# Patient Record
Sex: FEMALE | Race: WHITE | NOT HISPANIC OR LATINO | Employment: FULL TIME | URBAN - METROPOLITAN AREA
[De-identification: names, ages, dates, MRNs, and addresses within clinical notes are randomized per-mention and may not be internally consistent; named-entity substitution may affect disease eponyms.]

---

## 2017-12-07 ENCOUNTER — LAB (OUTPATIENT)
Dept: LAB | Facility: HOSPITAL | Age: 32
End: 2017-12-07

## 2017-12-07 ENCOUNTER — TRANSCRIBE ORDERS (OUTPATIENT)
Dept: LAB | Facility: HOSPITAL | Age: 32
End: 2017-12-07

## 2017-12-07 DIAGNOSIS — Z34.81 PRENATAL CARE, SUBSEQUENT PREGNANCY, FIRST TRIMESTER: ICD-10-CM

## 2017-12-07 DIAGNOSIS — Z3A.08 8 WEEKS GESTATION OF PREGNANCY: ICD-10-CM

## 2017-12-07 DIAGNOSIS — Z3A.08 8 WEEKS GESTATION OF PREGNANCY: Primary | ICD-10-CM

## 2017-12-07 LAB
ABO GROUP BLD: NORMAL
AMPHET+METHAMPHET UR QL: NEGATIVE
AMPHETAMINES UR QL: NEGATIVE
BACTERIA UR QL AUTO: ABNORMAL /HPF
BARBITURATES UR QL SCN: NEGATIVE
BASOPHILS # BLD AUTO: 0.02 10*3/MM3 (ref 0–0.2)
BASOPHILS NFR BLD AUTO: 0.2 % (ref 0–1)
BENZODIAZ UR QL SCN: NEGATIVE
BILIRUB UR QL STRIP: NEGATIVE
BLD GP AB SCN SERPL QL: NEGATIVE
BUPRENORPHINE SERPL-MCNC: NEGATIVE NG/ML
CANNABINOIDS SERPL QL: NEGATIVE
CLARITY UR: ABNORMAL
COCAINE UR QL: NEGATIVE
COLOR UR: YELLOW
DEPRECATED RDW RBC AUTO: 43.5 FL (ref 37–54)
EOSINOPHIL # BLD AUTO: 0.08 10*3/MM3 (ref 0–0.3)
EOSINOPHIL NFR BLD AUTO: 0.7 % (ref 0–3)
ERYTHROCYTE [DISTWIDTH] IN BLOOD BY AUTOMATED COUNT: 13.2 % (ref 11.3–14.5)
GLUCOSE BLD-MCNC: 75 MG/DL (ref 70–100)
GLUCOSE UR STRIP-MCNC: NEGATIVE MG/DL
HBV SURFACE AG SERPL QL IA: NORMAL
HCT VFR BLD AUTO: 39.4 % (ref 34.5–44)
HCV AB SER DONR QL: NORMAL
HGB BLD-MCNC: 13.2 G/DL (ref 11.5–15.5)
HGB UR QL STRIP.AUTO: NEGATIVE
HIV1+2 AB SER QL: NORMAL
HYALINE CASTS UR QL AUTO: ABNORMAL /LPF
IMM GRANULOCYTES # BLD: 0.02 10*3/MM3 (ref 0–0.03)
IMM GRANULOCYTES NFR BLD: 0.2 % (ref 0–0.6)
KETONES UR QL STRIP: NEGATIVE
LEUKOCYTE ESTERASE UR QL STRIP.AUTO: NEGATIVE
LYMPHOCYTES # BLD AUTO: 1.84 10*3/MM3 (ref 0.6–4.8)
LYMPHOCYTES NFR BLD AUTO: 16.2 % (ref 24–44)
MCH RBC QN AUTO: 30.3 PG (ref 27–31)
MCHC RBC AUTO-ENTMCNC: 33.5 G/DL (ref 32–36)
MCV RBC AUTO: 90.4 FL (ref 80–99)
METHADONE UR QL SCN: NEGATIVE
MONOCYTES # BLD AUTO: 0.89 10*3/MM3 (ref 0–1)
MONOCYTES NFR BLD AUTO: 7.8 % (ref 0–12)
NEUTROPHILS # BLD AUTO: 8.51 10*3/MM3 (ref 1.5–8.3)
NEUTROPHILS NFR BLD AUTO: 74.9 % (ref 41–71)
NITRITE UR QL STRIP: NEGATIVE
OPIATES UR QL: NEGATIVE
OXYCODONE UR QL SCN: NEGATIVE
PCP UR QL SCN: NEGATIVE
PH UR STRIP.AUTO: 6 [PH] (ref 5–8)
PLATELET # BLD AUTO: 262 10*3/MM3 (ref 150–450)
PMV BLD AUTO: 10.5 FL (ref 6–12)
PROPOXYPH UR QL: NEGATIVE
PROT UR QL STRIP: NEGATIVE
RBC # BLD AUTO: 4.36 10*6/MM3 (ref 3.89–5.14)
RBC # UR: ABNORMAL /HPF
REF LAB TEST METHOD: ABNORMAL
RH BLD: NEGATIVE
RUBV IGG SER QL: NORMAL
RUBV IGG SER-ACNC: >175 IU/ML
SP GR UR STRIP: 1.03 (ref 1–1.03)
SQUAMOUS #/AREA URNS HPF: ABNORMAL /HPF
TRICYCLICS UR QL SCN: NEGATIVE
UROBILINOGEN UR QL STRIP: ABNORMAL
WBC NRBC COR # BLD: 11.36 10*3/MM3 (ref 3.5–10.8)
WBC UR QL AUTO: ABNORMAL /HPF

## 2017-12-07 PROCEDURE — 36415 COLL VENOUS BLD VENIPUNCTURE: CPT

## 2017-12-07 PROCEDURE — 87086 URINE CULTURE/COLONY COUNT: CPT

## 2017-12-07 PROCEDURE — 80306 DRUG TEST PRSMV INSTRMNT: CPT

## 2017-12-07 PROCEDURE — 86803 HEPATITIS C AB TEST: CPT

## 2017-12-07 PROCEDURE — 81001 URINALYSIS AUTO W/SCOPE: CPT

## 2017-12-07 PROCEDURE — 82947 ASSAY GLUCOSE BLOOD QUANT: CPT

## 2017-12-07 PROCEDURE — 80081 OBSTETRIC PANEL INC HIV TSTG: CPT

## 2017-12-08 LAB — RPR SER QL: NORMAL

## 2017-12-09 LAB
BACTERIA SPEC AEROBE CULT: NORMAL
BACTERIA SPEC AEROBE CULT: NORMAL

## 2018-04-27 ENCOUNTER — APPOINTMENT (OUTPATIENT)
Dept: LAB | Facility: HOSPITAL | Age: 33
End: 2018-04-27

## 2018-04-27 ENCOUNTER — TRANSCRIBE ORDERS (OUTPATIENT)
Dept: LAB | Facility: HOSPITAL | Age: 33
End: 2018-04-27

## 2018-04-27 DIAGNOSIS — Z3A.28 28 WEEKS GESTATION OF PREGNANCY: Primary | ICD-10-CM

## 2018-04-27 DIAGNOSIS — Z34.83 PRENATAL CARE, SUBSEQUENT PREGNANCY, THIRD TRIMESTER: ICD-10-CM

## 2018-04-27 LAB
BLD GP AB SCN SERPL QL: NEGATIVE
DEPRECATED RDW RBC AUTO: 46.9 FL (ref 37–54)
ERYTHROCYTE [DISTWIDTH] IN BLOOD BY AUTOMATED COUNT: 13.6 % (ref 11.3–14.5)
GLUCOSE 1H P 100 G GLC PO SERPL-MCNC: 155 MG/DL (ref 65–140)
HCT VFR BLD AUTO: 34.3 % (ref 34.5–44)
HGB BLD-MCNC: 11.2 G/DL (ref 11.5–15.5)
MCH RBC QN AUTO: 30.6 PG (ref 27–31)
MCHC RBC AUTO-ENTMCNC: 32.7 G/DL (ref 32–36)
MCV RBC AUTO: 93.7 FL (ref 80–99)
PLATELET # BLD AUTO: 228 10*3/MM3 (ref 150–450)
PMV BLD AUTO: 11.4 FL (ref 6–12)
RBC # BLD AUTO: 3.66 10*6/MM3 (ref 3.89–5.14)
WBC NRBC COR # BLD: 11.6 10*3/MM3 (ref 3.5–10.8)

## 2018-04-27 PROCEDURE — 85027 COMPLETE CBC AUTOMATED: CPT | Performed by: OBSTETRICS & GYNECOLOGY

## 2018-04-27 PROCEDURE — 86850 RBC ANTIBODY SCREEN: CPT | Performed by: OBSTETRICS & GYNECOLOGY

## 2018-04-27 PROCEDURE — 82950 GLUCOSE TEST: CPT | Performed by: OBSTETRICS & GYNECOLOGY

## 2018-04-27 PROCEDURE — 36415 COLL VENOUS BLD VENIPUNCTURE: CPT | Performed by: OBSTETRICS & GYNECOLOGY

## 2018-04-27 PROCEDURE — 82962 GLUCOSE BLOOD TEST: CPT | Performed by: OBSTETRICS & GYNECOLOGY

## 2018-05-02 ENCOUNTER — LAB (OUTPATIENT)
Dept: LAB | Facility: HOSPITAL | Age: 33
End: 2018-05-02

## 2018-05-02 ENCOUNTER — TRANSCRIBE ORDERS (OUTPATIENT)
Dept: LAB | Facility: HOSPITAL | Age: 33
End: 2018-05-02

## 2018-05-02 DIAGNOSIS — O99.810 ABNORMAL MATERNAL GLUCOSE TOLERANCE, ANTEPARTUM: ICD-10-CM

## 2018-05-02 DIAGNOSIS — Z3A.28 28 WEEKS GESTATION OF PREGNANCY: ICD-10-CM

## 2018-05-02 DIAGNOSIS — Z3A.28 28 WEEKS GESTATION OF PREGNANCY: Primary | ICD-10-CM

## 2018-05-02 DIAGNOSIS — Z34.83 PRENATAL CARE, SUBSEQUENT PREGNANCY, THIRD TRIMESTER: ICD-10-CM

## 2018-05-02 LAB
GLUCOSE 1H P 100 G GLC PO SERPL-MCNC: 159 MG/DL (ref 65–199)
GLUCOSE 2H P 100 G GLC PO SERPL-MCNC: 96 MG/DL (ref 65–139)
GLUCOSE 3H P 100 G GLC PO SERPL-MCNC: 56 MG/DL (ref 65–109)
GLUCOSE P FAST SERPL-MCNC: 81 MG/DL (ref 65–99)

## 2018-05-02 PROCEDURE — 36415 COLL VENOUS BLD VENIPUNCTURE: CPT

## 2018-05-02 PROCEDURE — 82962 GLUCOSE BLOOD TEST: CPT | Performed by: OBSTETRICS & GYNECOLOGY

## 2018-05-02 PROCEDURE — 82952 GTT-ADDED SAMPLES: CPT

## 2018-05-02 PROCEDURE — 82951 GLUCOSE TOLERANCE TEST (GTT): CPT

## 2018-07-19 ENCOUNTER — PREP FOR SURGERY (OUTPATIENT)
Dept: OTHER | Facility: HOSPITAL | Age: 33
End: 2018-07-19

## 2018-07-19 DIAGNOSIS — Z3A.39 39 WEEKS GESTATION OF PREGNANCY: Primary | ICD-10-CM

## 2018-07-19 RX ORDER — ONDANSETRON 2 MG/ML
4 INJECTION INTRAMUSCULAR; INTRAVENOUS EVERY 6 HOURS PRN
Status: CANCELLED | OUTPATIENT
Start: 2018-07-19 | End: 2019-07-19

## 2018-07-19 RX ORDER — ONDANSETRON 4 MG/1
4 TABLET, FILM COATED ORAL EVERY 6 HOURS PRN
Status: CANCELLED | OUTPATIENT
Start: 2018-07-19 | End: 2019-07-19

## 2018-07-19 RX ORDER — ACETAMINOPHEN 325 MG/1
650 TABLET ORAL EVERY 4 HOURS PRN
Status: CANCELLED | OUTPATIENT
Start: 2018-07-19 | End: 2019-07-19

## 2018-07-19 RX ORDER — ZOLPIDEM TARTRATE 5 MG/1
5 TABLET ORAL NIGHTLY PRN
Status: CANCELLED | OUTPATIENT
Start: 2018-07-19 | End: 2018-07-29

## 2018-07-19 RX ORDER — OXYTOCIN-SODIUM CHLORIDE 0.9% IV SOLN 30 UNIT/500ML 30-0.9/5 UT/ML-%
85 SOLUTION INTRAVENOUS ONCE
Status: CANCELLED | OUTPATIENT
Start: 2018-07-19 | End: 2018-07-19

## 2018-07-19 RX ORDER — SODIUM CHLORIDE 0.9 % (FLUSH) 0.9 %
1-10 SYRINGE (ML) INJECTION AS NEEDED
Status: CANCELLED | OUTPATIENT
Start: 2018-07-19 | End: 2019-07-19

## 2018-07-19 RX ORDER — MAGNESIUM CARB/ALUMINUM HYDROX 105-160MG
30 TABLET,CHEWABLE ORAL ONCE
Status: CANCELLED | OUTPATIENT
Start: 2018-07-19 | End: 2018-07-19

## 2018-07-19 RX ORDER — METHYLERGONOVINE MALEATE 0.2 MG/ML
200 INJECTION INTRAVENOUS ONCE AS NEEDED
Status: CANCELLED | OUTPATIENT
Start: 2018-07-19 | End: 2018-07-26

## 2018-07-19 RX ORDER — HYDROCODONE BITARTRATE AND ACETAMINOPHEN 5; 325 MG/1; MG/1
2 TABLET ORAL EVERY 4 HOURS PRN
Status: CANCELLED | OUTPATIENT
Start: 2018-07-19 | End: 2018-07-29

## 2018-07-19 RX ORDER — OXYTOCIN-SODIUM CHLORIDE 0.9% IV SOLN 30 UNIT/500ML 30-0.9/5 UT/ML-%
650 SOLUTION INTRAVENOUS ONCE
Status: CANCELLED | OUTPATIENT
Start: 2018-07-19 | End: 2018-07-19

## 2018-07-19 RX ORDER — BUTORPHANOL TARTRATE 1 MG/ML
1 INJECTION, SOLUTION INTRAMUSCULAR; INTRAVENOUS
Status: CANCELLED | OUTPATIENT
Start: 2018-07-19 | End: 2019-07-19

## 2018-07-19 RX ORDER — MISOPROSTOL 200 UG/1
800 TABLET ORAL AS NEEDED
Status: CANCELLED | OUTPATIENT
Start: 2018-07-19 | End: 2019-07-19

## 2018-07-19 RX ORDER — SODIUM CHLORIDE, SODIUM LACTATE, POTASSIUM CHLORIDE, CALCIUM CHLORIDE 600; 310; 30; 20 MG/100ML; MG/100ML; MG/100ML; MG/100ML
125 INJECTION, SOLUTION INTRAVENOUS CONTINUOUS
Status: CANCELLED | OUTPATIENT
Start: 2018-07-19 | End: 2019-07-19

## 2018-07-19 RX ORDER — LIDOCAINE HYDROCHLORIDE 10 MG/ML
5 INJECTION, SOLUTION EPIDURAL; INFILTRATION; INTRACAUDAL; PERINEURAL AS NEEDED
Status: CANCELLED | OUTPATIENT
Start: 2018-07-19 | End: 2019-07-19

## 2018-07-19 RX ORDER — HYDROCODONE BITARTRATE AND ACETAMINOPHEN 5; 325 MG/1; MG/1
1 TABLET ORAL EVERY 4 HOURS PRN
Status: CANCELLED | OUTPATIENT
Start: 2018-07-19 | End: 2018-07-29

## 2018-07-19 RX ORDER — IBUPROFEN 600 MG/1
600 TABLET ORAL EVERY 6 HOURS PRN
Status: CANCELLED | OUTPATIENT
Start: 2018-07-19 | End: 2019-07-19

## 2018-07-23 ENCOUNTER — HOSPITAL ENCOUNTER (INPATIENT)
Dept: LABOR AND DELIVERY | Facility: HOSPITAL | Age: 33
LOS: 2 days | Discharge: HOME | End: 2018-07-25
Attending: OBSTETRICS & GYNECOLOGY | Admitting: OBSTETRICS & GYNECOLOGY

## 2018-07-23 DIAGNOSIS — Z3A.39 39 WEEKS GESTATION OF PREGNANCY: ICD-10-CM

## 2018-07-23 LAB
ABO GROUP BLD: NORMAL
BLD GP AB SCN SERPL QL: NEGATIVE
DEPRECATED RDW RBC AUTO: 45.9 FL (ref 37–54)
ERYTHROCYTE [DISTWIDTH] IN BLOOD BY AUTOMATED COUNT: 13.8 % (ref 11.3–14.5)
HCT VFR BLD AUTO: 33.7 % (ref 34.5–44)
HGB BLD-MCNC: 11.3 G/DL (ref 11.5–15.5)
MCH RBC QN AUTO: 30.5 PG (ref 27–31)
MCHC RBC AUTO-ENTMCNC: 33.5 G/DL (ref 32–36)
MCV RBC AUTO: 91.1 FL (ref 80–99)
PLATELET # BLD AUTO: 240 10*3/MM3 (ref 150–450)
PMV BLD AUTO: 10.6 FL (ref 6–12)
RBC # BLD AUTO: 3.7 10*6/MM3 (ref 3.89–5.14)
RH BLD: NEGATIVE
T&S EXPIRATION DATE: NORMAL
WBC NRBC COR # BLD: 13.06 10*3/MM3 (ref 3.5–10.8)

## 2018-07-23 PROCEDURE — 86850 RBC ANTIBODY SCREEN: CPT | Performed by: OBSTETRICS & GYNECOLOGY

## 2018-07-23 PROCEDURE — 86900 BLOOD TYPING SEROLOGIC ABO: CPT | Performed by: OBSTETRICS & GYNECOLOGY

## 2018-07-23 PROCEDURE — 59200 INSERT CERVICAL DILATOR: CPT | Performed by: OBSTETRICS & GYNECOLOGY

## 2018-07-23 PROCEDURE — 86901 BLOOD TYPING SEROLOGIC RH(D): CPT | Performed by: OBSTETRICS & GYNECOLOGY

## 2018-07-23 PROCEDURE — 85027 COMPLETE CBC AUTOMATED: CPT | Performed by: OBSTETRICS & GYNECOLOGY

## 2018-07-23 RX ORDER — BUTORPHANOL TARTRATE 1 MG/ML
1 INJECTION, SOLUTION INTRAMUSCULAR; INTRAVENOUS
Status: DISCONTINUED | OUTPATIENT
Start: 2018-07-23 | End: 2018-07-24 | Stop reason: HOSPADM

## 2018-07-23 RX ORDER — ONDANSETRON 4 MG/1
4 TABLET, FILM COATED ORAL EVERY 6 HOURS PRN
Status: DISCONTINUED | OUTPATIENT
Start: 2018-07-23 | End: 2018-07-24

## 2018-07-23 RX ORDER — MAGNESIUM CARB/ALUMINUM HYDROX 105-160MG
30 TABLET,CHEWABLE ORAL ONCE
Status: DISCONTINUED | OUTPATIENT
Start: 2018-07-23 | End: 2018-07-24 | Stop reason: HOSPADM

## 2018-07-23 RX ORDER — SODIUM CHLORIDE, SODIUM LACTATE, POTASSIUM CHLORIDE, CALCIUM CHLORIDE 600; 310; 30; 20 MG/100ML; MG/100ML; MG/100ML; MG/100ML
125 INJECTION, SOLUTION INTRAVENOUS CONTINUOUS
Status: DISCONTINUED | OUTPATIENT
Start: 2018-07-23 | End: 2018-07-25 | Stop reason: HOSPADM

## 2018-07-23 RX ORDER — SODIUM CHLORIDE 0.9 % (FLUSH) 0.9 %
1-10 SYRINGE (ML) INJECTION AS NEEDED
Status: DISCONTINUED | OUTPATIENT
Start: 2018-07-23 | End: 2018-07-24 | Stop reason: HOSPADM

## 2018-07-23 RX ORDER — ZOLPIDEM TARTRATE 5 MG/1
5 TABLET ORAL NIGHTLY PRN
Status: DISCONTINUED | OUTPATIENT
Start: 2018-07-23 | End: 2018-07-24 | Stop reason: HOSPADM

## 2018-07-23 RX ORDER — ACETAMINOPHEN 325 MG/1
650 TABLET ORAL EVERY 4 HOURS PRN
Status: DISCONTINUED | OUTPATIENT
Start: 2018-07-23 | End: 2018-07-24

## 2018-07-23 RX ORDER — ONDANSETRON 2 MG/ML
4 INJECTION INTRAMUSCULAR; INTRAVENOUS EVERY 6 HOURS PRN
Status: DISCONTINUED | OUTPATIENT
Start: 2018-07-23 | End: 2018-07-24

## 2018-07-23 RX ORDER — LIDOCAINE HYDROCHLORIDE 10 MG/ML
5 INJECTION, SOLUTION EPIDURAL; INFILTRATION; INTRACAUDAL; PERINEURAL AS NEEDED
Status: DISCONTINUED | OUTPATIENT
Start: 2018-07-23 | End: 2018-07-24 | Stop reason: HOSPADM

## 2018-07-23 RX ADMIN — SODIUM CHLORIDE, POTASSIUM CHLORIDE, SODIUM LACTATE AND CALCIUM CHLORIDE 125 ML/HR: 600; 310; 30; 20 INJECTION, SOLUTION INTRAVENOUS at 19:30

## 2018-07-24 ENCOUNTER — ANESTHESIA (OUTPATIENT)
Dept: LABOR AND DELIVERY | Facility: HOSPITAL | Age: 33
End: 2018-07-24

## 2018-07-24 ENCOUNTER — ANESTHESIA EVENT (OUTPATIENT)
Dept: LABOR AND DELIVERY | Facility: HOSPITAL | Age: 33
End: 2018-07-24

## 2018-07-24 PROBLEM — Z34.90 PREGNANCY: Status: ACTIVE | Noted: 2018-07-24

## 2018-07-24 PROBLEM — Z3A.39 PREGNANCY WITH 39 COMPLETED WEEKS GESTATION: Status: ACTIVE | Noted: 2018-07-24

## 2018-07-24 PROBLEM — Z34.90 PREGNANCY: Status: RESOLVED | Noted: 2018-07-24 | Resolved: 2018-07-24

## 2018-07-24 PROCEDURE — 25010000002 ONDANSETRON PER 1 MG: Performed by: OBSTETRICS & GYNECOLOGY

## 2018-07-24 PROCEDURE — C1755 CATHETER, INTRASPINAL: HCPCS | Performed by: ANESTHESIOLOGY

## 2018-07-24 PROCEDURE — C1755 CATHETER, INTRASPINAL: HCPCS

## 2018-07-24 PROCEDURE — 25010000002 ROPIVACAINE PER 1 MG: Performed by: ANESTHESIOLOGY

## 2018-07-24 PROCEDURE — 10907ZC DRAINAGE OF AMNIOTIC FLUID, THERAPEUTIC FROM PRODUCTS OF CONCEPTION, VIA NATURAL OR ARTIFICIAL OPENING: ICD-10-PCS | Performed by: OBSTETRICS & GYNECOLOGY

## 2018-07-24 PROCEDURE — 25010000002 FENTANYL CITRATE (PF) 100 MCG/2ML SOLUTION: Performed by: ANESTHESIOLOGY

## 2018-07-24 PROCEDURE — 59025 FETAL NON-STRESS TEST: CPT

## 2018-07-24 RX ORDER — LANOLIN 100 %
OINTMENT (GRAM) TOPICAL AS NEEDED
Status: DISCONTINUED | OUTPATIENT
Start: 2018-07-24 | End: 2018-07-25 | Stop reason: HOSPADM

## 2018-07-24 RX ORDER — ACETAMINOPHEN 325 MG/1
650 TABLET ORAL EVERY 4 HOURS PRN
Status: DISCONTINUED | OUTPATIENT
Start: 2018-07-24 | End: 2018-07-24 | Stop reason: HOSPADM

## 2018-07-24 RX ORDER — OXYTOCIN-SODIUM CHLORIDE 0.9% IV SOLN 30 UNIT/500ML 30-0.9/5 UT/ML-%
650 SOLUTION INTRAVENOUS ONCE
Status: COMPLETED | OUTPATIENT
Start: 2018-07-24 | End: 2018-07-24

## 2018-07-24 RX ORDER — ZOLPIDEM TARTRATE 5 MG/1
5 TABLET ORAL NIGHTLY PRN
Status: DISCONTINUED | OUTPATIENT
Start: 2018-07-24 | End: 2018-07-25 | Stop reason: HOSPADM

## 2018-07-24 RX ORDER — OXYTOCIN-SODIUM CHLORIDE 0.9% IV SOLN 30 UNIT/500ML 30-0.9/5 UT/ML-%
2-30 SOLUTION INTRAVENOUS
Status: DISCONTINUED | OUTPATIENT
Start: 2018-07-24 | End: 2018-07-25 | Stop reason: HOSPADM

## 2018-07-24 RX ORDER — ACETAMINOPHEN 325 MG/1
650 TABLET ORAL EVERY 4 HOURS PRN
Status: DISCONTINUED | OUTPATIENT
Start: 2018-07-24 | End: 2018-07-25 | Stop reason: HOSPADM

## 2018-07-24 RX ORDER — IBUPROFEN 600 MG/1
600 TABLET ORAL EVERY 6 HOURS PRN
Status: DISCONTINUED | OUTPATIENT
Start: 2018-07-24 | End: 2018-07-25 | Stop reason: HOSPADM

## 2018-07-24 RX ORDER — ROPIVACAINE HYDROCHLORIDE 2 MG/ML
16 INJECTION, SOLUTION EPIDURAL; INFILTRATION; PERINEURAL CONTINUOUS
Status: DISCONTINUED | OUTPATIENT
Start: 2018-07-24 | End: 2018-07-24

## 2018-07-24 RX ORDER — IBUPROFEN 600 MG/1
600 TABLET ORAL EVERY 6 HOURS PRN
Status: DISCONTINUED | OUTPATIENT
Start: 2018-07-24 | End: 2018-07-24 | Stop reason: HOSPADM

## 2018-07-24 RX ORDER — ONDANSETRON 4 MG/1
4 TABLET, FILM COATED ORAL EVERY 6 HOURS PRN
Status: DISCONTINUED | OUTPATIENT
Start: 2018-07-24 | End: 2018-07-24 | Stop reason: HOSPADM

## 2018-07-24 RX ORDER — TRISODIUM CITRATE DIHYDRATE AND CITRIC ACID MONOHYDRATE 500; 334 MG/5ML; MG/5ML
30 SOLUTION ORAL ONCE
Status: DISCONTINUED | OUTPATIENT
Start: 2018-07-24 | End: 2018-07-24 | Stop reason: HOSPADM

## 2018-07-24 RX ORDER — EPHEDRINE SULFATE/0.9% NACL/PF 50 MG/10ML
10 SYRINGE (ML) INTRAVENOUS
Status: DISCONTINUED | OUTPATIENT
Start: 2018-07-24 | End: 2018-07-24 | Stop reason: HOSPADM

## 2018-07-24 RX ORDER — BISACODYL 10 MG
10 SUPPOSITORY, RECTAL RECTAL DAILY PRN
Status: DISCONTINUED | OUTPATIENT
Start: 2018-07-25 | End: 2018-07-25 | Stop reason: HOSPADM

## 2018-07-24 RX ORDER — OXYTOCIN-SODIUM CHLORIDE 0.9% IV SOLN 30 UNIT/500ML 30-0.9/5 UT/ML-%
85 SOLUTION INTRAVENOUS ONCE
Status: COMPLETED | OUTPATIENT
Start: 2018-07-24 | End: 2018-07-24

## 2018-07-24 RX ORDER — DOCUSATE SODIUM 100 MG/1
100 CAPSULE, LIQUID FILLED ORAL DAILY
Status: DISCONTINUED | OUTPATIENT
Start: 2018-07-24 | End: 2018-07-25 | Stop reason: HOSPADM

## 2018-07-24 RX ORDER — FENTANYL CITRATE 50 UG/ML
INJECTION, SOLUTION INTRAMUSCULAR; INTRAVENOUS AS NEEDED
Status: DISCONTINUED | OUTPATIENT
Start: 2018-07-24 | End: 2018-07-24 | Stop reason: SURG

## 2018-07-24 RX ORDER — HYDROCODONE BITARTRATE AND ACETAMINOPHEN 5; 325 MG/1; MG/1
1 TABLET ORAL EVERY 4 HOURS PRN
Status: DISCONTINUED | OUTPATIENT
Start: 2018-07-24 | End: 2018-07-24 | Stop reason: HOSPADM

## 2018-07-24 RX ORDER — ONDANSETRON 2 MG/ML
4 INJECTION INTRAMUSCULAR; INTRAVENOUS EVERY 6 HOURS PRN
Status: DISCONTINUED | OUTPATIENT
Start: 2018-07-24 | End: 2018-07-25 | Stop reason: HOSPADM

## 2018-07-24 RX ORDER — MISOPROSTOL 200 UG/1
800 TABLET ORAL AS NEEDED
Status: DISCONTINUED | OUTPATIENT
Start: 2018-07-24 | End: 2018-07-24 | Stop reason: HOSPADM

## 2018-07-24 RX ORDER — LIDOCAINE HYDROCHLORIDE AND EPINEPHRINE 15; 5 MG/ML; UG/ML
INJECTION, SOLUTION EPIDURAL AS NEEDED
Status: DISCONTINUED | OUTPATIENT
Start: 2018-07-24 | End: 2018-07-24 | Stop reason: SURG

## 2018-07-24 RX ORDER — METHYLERGONOVINE MALEATE 0.2 MG/ML
200 INJECTION INTRAVENOUS ONCE AS NEEDED
Status: DISCONTINUED | OUTPATIENT
Start: 2018-07-24 | End: 2018-07-24 | Stop reason: HOSPADM

## 2018-07-24 RX ORDER — METOCLOPRAMIDE HYDROCHLORIDE 5 MG/ML
10 INJECTION INTRAMUSCULAR; INTRAVENOUS ONCE AS NEEDED
Status: DISCONTINUED | OUTPATIENT
Start: 2018-07-24 | End: 2018-07-24 | Stop reason: HOSPADM

## 2018-07-24 RX ORDER — HYDROCODONE BITARTRATE AND ACETAMINOPHEN 5; 325 MG/1; MG/1
2 TABLET ORAL EVERY 4 HOURS PRN
Status: DISCONTINUED | OUTPATIENT
Start: 2018-07-24 | End: 2018-07-24 | Stop reason: HOSPADM

## 2018-07-24 RX ORDER — HYDROCODONE BITARTRATE AND ACETAMINOPHEN 5; 325 MG/1; MG/1
1 TABLET ORAL EVERY 4 HOURS PRN
Status: DISCONTINUED | OUTPATIENT
Start: 2018-07-24 | End: 2018-07-25 | Stop reason: HOSPADM

## 2018-07-24 RX ORDER — ONDANSETRON 2 MG/ML
4 INJECTION INTRAMUSCULAR; INTRAVENOUS ONCE AS NEEDED
Status: DISCONTINUED | OUTPATIENT
Start: 2018-07-24 | End: 2018-07-24 | Stop reason: HOSPADM

## 2018-07-24 RX ORDER — ONDANSETRON 2 MG/ML
4 INJECTION INTRAMUSCULAR; INTRAVENOUS EVERY 6 HOURS PRN
Status: DISCONTINUED | OUTPATIENT
Start: 2018-07-24 | End: 2018-07-24 | Stop reason: HOSPADM

## 2018-07-24 RX ORDER — DIPHENHYDRAMINE HYDROCHLORIDE 50 MG/ML
12.5 INJECTION INTRAMUSCULAR; INTRAVENOUS EVERY 8 HOURS PRN
Status: DISCONTINUED | OUTPATIENT
Start: 2018-07-24 | End: 2018-07-24 | Stop reason: HOSPADM

## 2018-07-24 RX ORDER — PROMETHAZINE HYDROCHLORIDE 25 MG/1
25 TABLET ORAL EVERY 6 HOURS PRN
Status: DISCONTINUED | OUTPATIENT
Start: 2018-07-24 | End: 2018-07-25 | Stop reason: HOSPADM

## 2018-07-24 RX ADMIN — FENTANYL CITRATE 100 MCG: 50 INJECTION, SOLUTION INTRAMUSCULAR; INTRAVENOUS at 09:25

## 2018-07-24 RX ADMIN — ROPIVACAINE HYDROCHLORIDE 10 ML: 5 INJECTION, SOLUTION EPIDURAL; INFILTRATION; PERINEURAL at 09:25

## 2018-07-24 RX ADMIN — IBUPROFEN 600 MG: 600 TABLET ORAL at 21:06

## 2018-07-24 RX ADMIN — SODIUM CHLORIDE, POTASSIUM CHLORIDE, SODIUM LACTATE AND CALCIUM CHLORIDE 125 ML/HR: 600; 310; 30; 20 INJECTION, SOLUTION INTRAVENOUS at 05:25

## 2018-07-24 RX ADMIN — IBUPROFEN 600 MG: 600 TABLET ORAL at 13:28

## 2018-07-24 RX ADMIN — LIDOCAINE HYDROCHLORIDE AND EPINEPHRINE 3 ML: 15; 5 INJECTION, SOLUTION EPIDURAL at 09:22

## 2018-07-24 RX ADMIN — OXYTOCIN 2 MILLI-UNITS/MIN: 10 INJECTION INTRAVENOUS at 05:25

## 2018-07-24 RX ADMIN — OXYTOCIN 650 ML/HR: 10 INJECTION INTRAVENOUS at 12:35

## 2018-07-24 RX ADMIN — ONDANSETRON 4 MG: 2 INJECTION INTRAMUSCULAR; INTRAVENOUS at 05:57

## 2018-07-24 RX ADMIN — ROPIVACAINE HYDROCHLORIDE 16 ML/HR: 2 INJECTION, SOLUTION EPIDURAL; INFILTRATION at 09:32

## 2018-07-24 RX ADMIN — SODIUM CHLORIDE, POTASSIUM CHLORIDE, SODIUM LACTATE AND CALCIUM CHLORIDE 1000 ML: 600; 310; 30; 20 INJECTION, SOLUTION INTRAVENOUS at 09:06

## 2018-07-24 RX ADMIN — OXYTOCIN 85 ML/HR: 10 INJECTION INTRAVENOUS at 13:08

## 2018-07-24 NOTE — PLAN OF CARE
Problem: Labor (Cervical Ripen, Induct, Augment) (Adult,Obstetrics,Pediatric)  Goal: Signs and Symptoms of Listed Potential Problems Will be Absent, Minimized or Managed (Labor)  Outcome: Ongoing (interventions implemented as appropriate)   07/24/18 0740   Goal/Outcome Evaluation   Problems Assessed (Labor) all   Problems Present (Labor) none

## 2018-07-24 NOTE — PROCEDURES
Transcervical arroyo placed per physician request.  FHT's class 1.  Patient tolerated well. 24 Norwegian, 60ml.    Vahid Iqbal MD  7/23/2018  10:56 PM

## 2018-07-24 NOTE — PLAN OF CARE
Problem: Patient Care Overview  Goal: Plan of Care Review  Outcome: Ongoing (interventions implemented as appropriate)   07/24/18 0109   Coping/Psychosocial   Plan of Care Reviewed With spouse;patient   Plan of Care Review   Progress improving     Goal: Individualization and Mutuality  Outcome: Ongoing (interventions implemented as appropriate)   07/24/18 0109   Individualization   Patient Specific Preferences Pt's pain will remain under control overnight   Patient Specific Goals (Include Timeframe) Healthy mom and healthy baby   Patient Specific Interventions Wagner bulb overnight, pit per protocol in the morning     Goal: Discharge Needs Assessment  Outcome: Ongoing (interventions implemented as appropriate)   07/24/18 0109   Discharge Needs Assessment   Concerns to be Addressed denies needs/concerns at this time   Patient/Family Anticipates Transition to home   Patient/Family Anticipated Services at Transition none   Anticipated Changes Related to Illness none   Equipment Needed After Discharge none   Disability   Equipment Currently Used at Home none       Problem: Labor (Cervical Ripen, Induct, Augment) (Adult,Obstetrics,Pediatric)  Goal: Signs and Symptoms of Listed Potential Problems Will be Absent, Minimized or Managed (Labor)  Outcome: Ongoing (interventions implemented as appropriate)   07/24/18 0109   Goal/Outcome Evaluation   Problems Assessed (Labor) all   Problems Present (Labor) none

## 2018-07-24 NOTE — ANESTHESIA PREPROCEDURE EVALUATION
Anesthesia Evaluation     Patient summary reviewed and Nursing notes reviewed   NPO Solid Status: > 6 hours  NPO Liquid Status: > 2 hours           Airway   Mallampati: II  TM distance: >3 FB  Neck ROM: full  No difficulty expected  Dental      Pulmonary - negative pulmonary ROS   Cardiovascular - negative cardio ROS        Neuro/Psych- negative ROS  GI/Hepatic/Renal/Endo - negative ROS     Musculoskeletal (-) negative ROS    Abdominal    Substance History - negative use     OB/GYN    (+) Pregnant,         Other                        Anesthesia Plan    ASA 2     epidural     Anesthetic plan and risks discussed with patient.

## 2018-07-24 NOTE — LACTATION NOTE
07/24/18 1533   Maternal Information   Date of Referral 07/24/18   Person Making Referral other (see comments)  (courtesy)   Maternal Reason for Referral breastfeeding currently   Maternal Assessment   Breast Size Issue none   Breast Shape Bilateral:;round   Breast Density Bilateral:;soft   Nipples Bilateral:;everted   Maternal Infant Feeding   Maternal Emotional State relaxed   Infant Positioning cradle  (adjusted cradle hold and deepened latch)   Pain with Feeding no   Comfort Measures Before/During Feeding infant position adjusted;latch adjusted;maternal position adjusted   Latch Assistance yes   Equipment Type   Breast Pump Type double electric, personal   Reproductive Interventions   Breast Care: Breastfeeding frequency of feeding adjusted   Breastfeeding Assistance assisted with positioning;feeding cue recognition promoted;feeding on demand promoted;feeding session observed;infant latch-on verified;infant suck/swallow verified;infant stimulated to wakeful state;support offered   Breastfeeding Support encouragement provided;lactation counseling provided

## 2018-07-24 NOTE — L&D DELIVERY NOTE
[facility]  Vaginal Delivery Note    Delivery     Delivery:       YOB: 2020    Time of Birth: 12:30 PM      Anesthesia:       Delivering clinician:      Forceps?   No   Vacuum? No    Shoulder dystocia present: No        Delivery narrative:  There was delivery of a viable female  at 40 1/7 weeks.  The  was born in OA presentation over an intact perineum.  Shoulders and body delivered atraumatically.   Cord was milked 5 times. Cord was doubly clamped and cut by FOB. Placenta delivered visibly intact. Mother and  tolerated well.  EBL was 100    Infant    Findings: female  infant     Infant observations: Weight: 3660 g (8 lb 1.1 oz)   Length: 19.5  in  Observations/Comments:         Apgars:    @ 1 minute /       @ 5 minutes   Infant Name:      Placenta, Cord, and Fluid    Placenta delivered     at         Cord:    present.   Nuchal Cord?  no   Cord blood obtained:      Cord gases obtained:                 Repair    Episiotomy: Not recorded    Lacerations: No   Estimated Blood Loss:             Complications  none    Disposition  Mother to Mother Baby/Postpartum  in stable condition currently.  Baby to remains with mom  in stable condition currently.      Summer Dorsey DO  18  12:47 PM

## 2018-07-24 NOTE — ANESTHESIA PROCEDURE NOTES
Labor Epidural    Patient location during procedure: OB  Performed By  Anesthesiologist: GHAZALA AVILES  Preanesthetic Checklist  Completed: patient identified, site marked, surgical consent, pre-op evaluation, timeout performed, IV checked, risks and benefits discussed and monitors and equipment checked  Prep:  Pt Position:sitting  Sterile Tech:gloves, mask, sterile barrier and cap  Prep:chlorhexidine gluconate and isopropyl alcohol  Monitoring:blood pressure monitoring and continuous pulse oximetry  Epidural Block Procedure:  Approach:midline  Guidance:landmark technique and palpation technique  Location:L3-L4  Needle Type:Tuohy  Needle Gauge:17 G  Loss of Resistance Medium: air  Loss of Resistance: 4cm  Cath Depth at skin:9 cm  Paresthesia: none  Aspiration:negative  Test Dose:negative  Number of Attempts: 1  Post Assessment:  Dressing:secured with tape and occlusive dressing applied (Tegaderm Placed)  Pt Tolerance:patient tolerated the procedure well with no apparent complications  Complications:no

## 2018-07-24 NOTE — LACTATION NOTE
This note was copied from a baby's chart.     07/24/18 1535   Nutrition   Feeding Method breastfeeding   Feeding Tolerance/Success coordinated suck;coordinated swallow   Feeding Interventions latch assistance provided   Additional Documentation LATCH Score (Group)   Breastfeeding Session   Infant Positioning cradle  (adjusted cradle hold closer to mom's body and deepened latch)   Effective Latch During Feeding yes   LATCH Score   Latch 2-->grasps breast, tongue down, lips flanged, rhythmic sucking   Audible Swallowing 1-->a few with stimulation   Type of Nipple 2-->everted (after stimulation)   Comfort (Breast/Nipple) 2-->soft/nontender   Hold (Positioning) 1-->minimal assist, teach one side, mother does other, staff holds   Score 8

## 2018-07-24 NOTE — PROGRESS NOTES
Breathing with her contractions  SVE:  6/100/0  AROM:  Clear fluid  CTN:  Every 2-3  FHT'S:  Reactive  Ok to get epidural if she wants  Pitocin on  CPC

## 2018-07-24 NOTE — H&P
"History and Physical:    Subjective     Chief Complaint   Patient presents with   • Scheduled Induction       Honey Thomas is a 32 y.o. year old  with an Estimated Date of Delivery: 18 currently at 40w1d presenting with no complaints.  She is here for an elective induction for post-dates.    Prenatal care has been with Summer Dorsey DO.  It has been significant for normal pregnancy.        Review of Systems  Pertinent items are noted in HPI.  A comprehensive review of systems was negative.     History reviewed. No pertinent past medical history.  Past Surgical History:   Procedure Laterality Date   • OTHER SURGICAL HISTORY Left     repaired compound fracture (left leg)   • WISDOM TOOTH EXTRACTION       Family History   Problem Relation Age of Onset   • Hypertension Father    • Diabetes Father    • Hypertension Mother    • Graves' disease Sister    • Lung cancer Maternal Grandmother      Social History   Substance Use Topics   • Smoking status: Never Smoker   • Smokeless tobacco: Never Used   • Alcohol use No     No prescriptions prior to admission.     Allergies:  Patient has no known allergies.  OB History    Para Term  AB Living   5 2 1   2 2   SAB TAB Ectopic Molar Multiple Live Births   2       0 2      # Outcome Date GA Lbr Hemanth/2nd Weight Sex Delivery Anes PTL Lv   5 Current            4 Term 17 40w3d   F    NAGI   3 SAB 16 6w0d          2 SAB 04/10/13 6w0d          1 Para 05 41w0d   F Vag-Spont   NAGI            Objective     /63   Pulse 65   Temp 98 °F (36.7 °C) (Oral)   Resp 18   Ht 167.6 cm (66\")   Wt 91.6 kg (202 lb)   Breastfeeding? Yes   BMI 32.60 kg/m²     Physical Exam    General:  No acute distress           Abdomen: Gravid, nontender       FHT's: reactive    Cervix: Checked by laborist with arroyo bulb placed   Blades: Contraction are irregular     Lab Review   External Prenatal Results     Pregnancy Outside Results - Transcribed From Office " Records - See Scanned Records For Details     Test Value Date Time    Hgb 11.3 g/dL (L) 07/23/18 2139    Hct 33.7 % (L) 07/23/18 2139    ABO A  07/23/18 2139    Rh Negative  07/23/18 2139    Antibody Screen Negative  07/23/18 2139    Glucose Fasting GTT       Glucose Tolerance Test 1 hour       Glucose Tolerance Test 3 hour       Gonorrhea (discrete)       Chlamydia (discrete)       RPR Non-Reactive  12/07/17 1146    VDRL       Syphilis Antibody       Rubella >175.0 IU/mL 12/07/17 1146      Immune  12/07/17 1146    HBsAg Non-Reactive  12/07/17 1146    Herpes Simplex Virus PCR       Herpes Simplex VIrus Culture       HIV Non-Reactive  12/07/17 1146    Hep C RNA Quant PCR       Hep C Antibody Non-Reactive  12/07/17 1146    AFP       Group B Strep       GBS Susceptibility to Clindamycin       GBS Susceptibility to Erythromycin       Fetal Fibronectin       Genetic Testing, Maternal Blood             Drug Screening     Test Value Date Time    Urine Drug Screen       Amphetamine Screen Negative  12/07/17 1146    Barbiturate Screen Negative  12/07/17 1146    Benzodiazepine Screen Negative  12/07/17 1146    Methadone Screen Negative  12/07/17 1146    Phencyclidine Screen Negative  12/07/17 1146    Opiates Screen Negative  12/07/17 1146    THC Screen Negative  12/07/17 1146    Cocaine Screen       Propoxyphene Screen Negative  12/07/17 1146    Buprenorphine Screen Negative  12/07/17 1146    Methamphetamine Screen       Oxycodone Screen Negative  12/07/17 1146    Tricyclic Antidepressants Screen Negative  12/07/17 1146                    Assessment/Plan     ASSESSMENT  1. IUP at 40w1d  2. induction of labor   3. GBS negative  PLAN  1. Admit to labor and delivery   2.  pitocin and arroyo bulb         Summer Dorsey DO  7/24/2018@

## 2018-07-25 VITALS
DIASTOLIC BLOOD PRESSURE: 71 MMHG | SYSTOLIC BLOOD PRESSURE: 123 MMHG | BODY MASS INDEX: 32.47 KG/M2 | HEIGHT: 66 IN | RESPIRATION RATE: 16 BRPM | HEART RATE: 85 BPM | OXYGEN SATURATION: 98 % | WEIGHT: 202 LBS | TEMPERATURE: 98.5 F

## 2018-07-25 PROBLEM — Z3A.39 PREGNANCY WITH 39 COMPLETED WEEKS GESTATION: Status: RESOLVED | Noted: 2018-07-24 | Resolved: 2018-07-25

## 2018-07-25 LAB
ABO GROUP BLD: NORMAL
BASOPHILS # BLD AUTO: 0.02 10*3/MM3 (ref 0–0.2)
BASOPHILS NFR BLD AUTO: 0.1 % (ref 0–1)
DEPRECATED RDW RBC AUTO: 46.5 FL (ref 37–54)
EOSINOPHIL # BLD AUTO: 0.06 10*3/MM3 (ref 0–0.3)
EOSINOPHIL NFR BLD AUTO: 0.4 % (ref 0–3)
ERYTHROCYTE [DISTWIDTH] IN BLOOD BY AUTOMATED COUNT: 14.2 % (ref 11.3–14.5)
FETAL BLEED: NEGATIVE
HCT VFR BLD AUTO: 30.6 % (ref 34.5–44)
HGB BLD-MCNC: 10.2 G/DL (ref 11.5–15.5)
IMM GRANULOCYTES # BLD: 0.05 10*3/MM3 (ref 0–0.03)
IMM GRANULOCYTES NFR BLD: 0.3 % (ref 0–0.6)
LYMPHOCYTES # BLD AUTO: 2.02 10*3/MM3 (ref 0.6–4.8)
LYMPHOCYTES NFR BLD AUTO: 14.1 % (ref 24–44)
MCH RBC QN AUTO: 30.4 PG (ref 27–31)
MCHC RBC AUTO-ENTMCNC: 33.3 G/DL (ref 32–36)
MCV RBC AUTO: 91.1 FL (ref 80–99)
MONOCYTES # BLD AUTO: 0.91 10*3/MM3 (ref 0–1)
MONOCYTES NFR BLD AUTO: 6.3 % (ref 0–12)
NEUTROPHILS # BLD AUTO: 11.34 10*3/MM3 (ref 1.5–8.3)
NEUTROPHILS NFR BLD AUTO: 79.1 % (ref 41–71)
NUMBER OF DOSES: NORMAL
PLATELET # BLD AUTO: 213 10*3/MM3 (ref 150–450)
PMV BLD AUTO: 11.4 FL (ref 6–12)
RBC # BLD AUTO: 3.36 10*6/MM3 (ref 3.89–5.14)
RH BLD: NEGATIVE
WBC NRBC COR # BLD: 14.35 10*3/MM3 (ref 3.5–10.8)

## 2018-07-25 PROCEDURE — 86900 BLOOD TYPING SEROLOGIC ABO: CPT | Performed by: OBSTETRICS & GYNECOLOGY

## 2018-07-25 PROCEDURE — 85461 HEMOGLOBIN FETAL: CPT | Performed by: OBSTETRICS & GYNECOLOGY

## 2018-07-25 PROCEDURE — 86901 BLOOD TYPING SEROLOGIC RH(D): CPT | Performed by: OBSTETRICS & GYNECOLOGY

## 2018-07-25 PROCEDURE — 3E0234Z INTRODUCTION OF SERUM, TOXOID AND VACCINE INTO MUSCLE, PERCUTANEOUS APPROACH: ICD-10-PCS | Performed by: OBSTETRICS & GYNECOLOGY

## 2018-07-25 PROCEDURE — 85025 COMPLETE CBC W/AUTO DIFF WBC: CPT | Performed by: OBSTETRICS & GYNECOLOGY

## 2018-07-25 PROCEDURE — 25010000002 RHO D IMMUNE GLOBULIN 1500 UNIT/2ML SOLUTION PREFILLED SYRINGE: Performed by: OBSTETRICS & GYNECOLOGY

## 2018-07-25 RX ORDER — IBUPROFEN 600 MG/1
600 TABLET ORAL EVERY 6 HOURS PRN
Qty: 30 TABLET | Refills: 1 | Status: SHIPPED | OUTPATIENT
Start: 2018-07-25 | End: 2019-05-28

## 2018-07-25 RX ADMIN — IBUPROFEN 600 MG: 600 TABLET ORAL at 03:09

## 2018-07-25 RX ADMIN — HUMAN RHO(D) IMMUNE GLOBULIN 1500 UNITS: 1500 SOLUTION INTRAMUSCULAR; INTRAVENOUS at 16:14

## 2018-07-25 RX ADMIN — IBUPROFEN 600 MG: 600 TABLET ORAL at 14:19

## 2018-07-25 NOTE — PLAN OF CARE
Problem: Patient Care Overview  Goal: Plan of Care Review  Outcome: Ongoing (interventions implemented as appropriate)   07/25/18 1127   Coping/Psychosocial   Plan of Care Reviewed With patient   Plan of Care Review   Progress improving       Problem: Breastfeeding (Adult,Obstetrics,Pediatric)  Goal: Signs and Symptoms of Listed Potential Problems Will be Absent, Minimized or Managed (Breastfeeding)  Outcome: Ongoing (interventions implemented as appropriate)   07/25/18 1127   Goal/Outcome Evaluation   Problems Assessed (Breastfeeding) all   Problems Present (Breastfeeding) none

## 2018-07-25 NOTE — PLAN OF CARE
Problem: Patient Care Overview  Goal: Plan of Care Review  Outcome: Ongoing (interventions implemented as appropriate)   07/25/18 0601   Coping/Psychosocial   Plan of Care Reviewed With patient   Plan of Care Review   Progress improving

## 2018-07-25 NOTE — PROGRESS NOTES
7/25/2018  PPD #1    Subjective   Honey feels well.  Patient describes her lochia as less than menses.  Pain is well controlled       Objective   Temp: Temp:  [97.5 °F (36.4 °C)-98.3 °F (36.8 °C)] 97.6 °F (36.4 °C) Temp src: Oral   BP: BP: ()/(45-73) 105/66        Pulse: Heart Rate:  [63-96] 82  RR: Resp:  [16-20] 18    General:  No acute distress   Abdomen: Fundus firm and beneath umbilicus   Pelvis: deferred     Lab Results   Component Value Date    WBC 14.35 (H) 07/25/2018    HGB 10.2 (L) 07/25/2018    HCT 30.6 (L) 07/25/2018    MCV 91.1 07/25/2018     07/25/2018    HEPBSAG Non-Reactive 12/07/2017       Assessment  1. PPD# 1 after vaginal delivery    Plan  1. Supportive cares  2. Desires d/c home if baby ok for home      This note has been electronically signed.    Sachi Banks MD  9:30 AM  July 25, 2018

## 2018-07-25 NOTE — DISCHARGE SUMMARY
[facility]  Vaginal Delivery Discharge Summary      Patient: Honey Thomas      MR#:[id number]  Admission  Diagnosis: IUP @ 40w0d, IOL  Discharge Diagnosis: PPD#1 s/p  @ 40w1d    Date of Admission: 2018  Date of Discharge:  2018    Procedures:  Vaginal, Spontaneous Delivery     2018    12:30 PM      Service:  Obstetrics    Hospital Course:  Patient underwent vaginal delivery and remained in the hospital for 2 days.  During that time she remained afebrile and hemodynamically stable.  On the day of discharge, she was eating, ambulating and voiding without difficulty.  She was evaluated by MD on the morning of the day of discharge at which time discharge home was strongly desired by patient in the event that the baby was okay to go home per the pediatrician.    Lab Results   Component Value Date    WBC 14.35 (H) 2018    HGB 10.2 (L) 2018    HCT 30.6 (L) 2018    MCV 91.1 2018     2018       Results from last 7 days  Lab Units 18  0718 189   ABO TYPING  A A   RH TYPING  Negative Negative   ANTIBODY SCREEN   --  Negative       Discharge Medications     Discharge Medications      New Medications      Instructions Start Date   ibuprofen 600 MG tablet  Commonly known as:  ADVIL,MOTRIN   600 mg, Oral, Every 6 Hours PRN             Discharge Disposition:  To Home    Discharge Condition:  Stable    Discharge Diet: regular    Activity at Discharge: Pelvic rest    Follow-up Appointments  6 weeks - Dr. Willian Dennison MD  18  4:14 PM

## 2018-07-25 NOTE — ANESTHESIA POSTPROCEDURE EVALUATION
Patient: Honey Thomas    Procedure Summary     Date:  07/24/18 Room / Location:      Anesthesia Start:  0911 Anesthesia Stop:  1234    Procedure:  LABOR ANALGESIA Diagnosis:      Scheduled Providers:   Provider:  Daniel De León DO    Anesthesia Type:  epidural ASA Status:  2          Anesthesia Type: epidural  Last vitals  BP   105/66 (07/25/18 0800)   Temp   97.6 °F (36.4 °C) (07/25/18 0800)   Pulse   82 (07/25/18 0800)   Resp   18 (07/25/18 0800)     SpO2   98 % (07/24/18 0933)     Post Anesthesia Care and Evaluation    Patient location during evaluation: bedside  Patient participation: complete - patient participated  Level of consciousness: awake and alert  Pain management: adequate  Airway patency: patent  Anesthetic complications: No anesthetic complications    Cardiovascular status: acceptable  Respiratory status: acceptable  Hydration status: acceptable  Post Neuraxial Block status: Motor and sensory function returned to baseline and No signs or symptoms of PDPH

## 2018-10-19 ENCOUNTER — TRANSCRIBE ORDERS (OUTPATIENT)
Dept: LAB | Facility: HOSPITAL | Age: 33
End: 2018-10-19

## 2018-10-19 ENCOUNTER — LAB (OUTPATIENT)
Dept: LAB | Facility: HOSPITAL | Age: 33
End: 2018-10-19

## 2018-10-19 DIAGNOSIS — Z01.419 PAP SMEAR, LOW-RISK: ICD-10-CM

## 2018-10-19 DIAGNOSIS — Z01.419 PAP SMEAR, LOW-RISK: Primary | ICD-10-CM

## 2018-10-19 LAB
25(OH)D3 SERPL-MCNC: 20.4 NG/ML
ARTICHOKE IGE QN: 119 MG/DL (ref 0–130)
CHOLEST SERPL-MCNC: 176 MG/DL (ref 0–200)
GLUCOSE BLD-MCNC: 73 MG/DL (ref 70–100)
HDLC SERPL-MCNC: 51 MG/DL (ref 40–60)
TRIGL SERPL-MCNC: 60 MG/DL (ref 0–150)
TSH SERPL DL<=0.05 MIU/L-ACNC: 0.76 MIU/ML (ref 0.35–5.35)

## 2018-10-19 PROCEDURE — 82947 ASSAY GLUCOSE BLOOD QUANT: CPT

## 2018-10-19 PROCEDURE — 82306 VITAMIN D 25 HYDROXY: CPT

## 2018-10-19 PROCEDURE — 36415 COLL VENOUS BLD VENIPUNCTURE: CPT

## 2018-10-19 PROCEDURE — 80061 LIPID PANEL: CPT

## 2018-10-19 PROCEDURE — 84443 ASSAY THYROID STIM HORMONE: CPT

## 2019-05-28 ENCOUNTER — OFFICE VISIT (OUTPATIENT)
Dept: FAMILY MEDICINE CLINIC | Facility: CLINIC | Age: 34
End: 2019-05-28

## 2019-05-28 VITALS
WEIGHT: 164.5 LBS | TEMPERATURE: 97 F | BODY MASS INDEX: 26.44 KG/M2 | OXYGEN SATURATION: 98 % | HEART RATE: 96 BPM | RESPIRATION RATE: 18 BRPM | SYSTOLIC BLOOD PRESSURE: 128 MMHG | DIASTOLIC BLOOD PRESSURE: 72 MMHG | HEIGHT: 66 IN

## 2019-05-28 DIAGNOSIS — F41.9 ANXIETY: Primary | ICD-10-CM

## 2019-05-28 PROCEDURE — 99203 OFFICE O/P NEW LOW 30 MIN: CPT | Performed by: NURSE PRACTITIONER

## 2019-05-28 RX ORDER — ERGOCALCIFEROL 1.25 MG/1
50000 CAPSULE ORAL WEEKLY
COMMUNITY
End: 2019-06-26

## 2019-05-28 RX ORDER — SERTRALINE HYDROCHLORIDE 100 MG/1
TABLET, FILM COATED ORAL
Qty: 45 TABLET | Refills: 5 | Status: SHIPPED | OUTPATIENT
Start: 2019-05-28 | End: 2019-06-26 | Stop reason: DRUGHIGH

## 2019-05-28 RX ORDER — ACETAMINOPHEN AND CODEINE PHOSPHATE 120; 12 MG/5ML; MG/5ML
1 SOLUTION ORAL DAILY
Refills: 1 | COMMUNITY
Start: 2019-04-18 | End: 2019-11-22

## 2019-05-28 NOTE — PROGRESS NOTES
Subjective   Honey Thomas is a 33 y.o. female.     History of Present Illness Honey is here to establish care. 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 Overall she enjoys good health. Has a history of anxiety and has been on Paxil in the past. She feels like her anxiety can be just from her place in life and also to attributed to previous issues. She is interested in speaking with a therapist. People have begun to notice a change in her behavior. She is more withdrawn and has rumination of thoughts. Feels irritable. Denies depression.  Elanay breast-feeding and will be weaning her daughter in 2 months.    Outpatient Encounter Medications as of 5/28/2019   Medication Sig Dispense Refill   • norethindrone (MICRONOR) 0.35 MG tablet Take 1 tablet by mouth Daily.  1   • vitamin D (ERGOCALCIFEROL) 02859 units capsule capsule Take 50,000 Units by mouth 1 (One) Time Per Week.     • [DISCONTINUED] ibuprofen (ADVIL,MOTRIN) 600 MG tablet Take 1 tablet by mouth Every 6 (Six) Hours As Needed for Mild Pain . 30 tablet 1     No facility-administered encounter medications on file as of 5/28/2019.        The following portions of the patient's history were reviewed and updated as appropriate: allergies, current medications, past family history, past medical history, past social history, past surgical history and problem list.    Review of Systems   Constitutional: Negative for appetite change, fever, unexpected weight gain and unexpected weight loss.   HENT: Negative for congestion, nosebleeds, sore throat and trouble swallowing.    Eyes: Negative for visual disturbance.   Respiratory: Negative for cough, shortness of breath and wheezing.    Cardiovascular: Negative for chest pain, palpitations and leg swelling.   Gastrointestinal: Negative for abdominal pain, blood in stool, constipation, diarrhea, nausea and vomiting.   Endocrine: Negative for polydipsia, polyphagia and polyuria.   Genitourinary: Negative for  "dysuria, frequency and hematuria.   Musculoskeletal: Negative for arthralgias, joint swelling and myalgias.   Skin: Negative for rash.   Neurological: Negative for dizziness, seizures, syncope and numbness.   Hematological: Negative for adenopathy. Does not bruise/bleed easily.   Psychiatric/Behavioral: Negative for behavioral problems, sleep disturbance and depressed mood. The patient is nervous/anxious.        Objective     Visit Vitals  /72 (BP Location: Left arm, Patient Position: Sitting)   Pulse 96   Temp 97 °F (36.1 °C) (Temporal)   Resp 18   Ht 167.6 cm (66\")   Wt 74.6 kg (164 lb 8 oz)   SpO2 98%   BMI 26.55 kg/m²       Physical Exam   Constitutional: She is oriented to person, place, and time. She appears well-developed and well-nourished. No distress.   HENT:   Head: Normocephalic and atraumatic.   Right Ear: Tympanic membrane and external ear normal.   Left Ear: Tympanic membrane and external ear normal.   Nose: Nose normal.   Mouth/Throat: Oropharynx is clear and moist. No oropharyngeal exudate.   Eyes: Conjunctivae are normal. Pupils are equal, round, and reactive to light. Right eye exhibits no discharge. Left eye exhibits no discharge. No scleral icterus.   Neck: Neck supple. No tracheal deviation present. No thyromegaly present.   Cardiovascular: Normal rate, regular rhythm and normal heart sounds. Exam reveals no gallop and no friction rub.   No murmur heard.  Pulmonary/Chest: Effort normal and breath sounds normal. No respiratory distress. She has no wheezes.   Abdominal: Soft. Bowel sounds are normal. She exhibits no distension and no mass. There is no tenderness.   Musculoskeletal: She exhibits no edema or deformity.   Lymphadenopathy:     She has no cervical adenopathy.   Neurological: She is alert and oriented to person, place, and time. Coordination normal.   Skin: Skin is warm and dry. Capillary refill takes less than 2 seconds. No rash noted. No erythema.   Psychiatric: She has a " normal mood and affect. Her speech is normal and behavior is normal. Judgment and thought content normal.   Nursing note and vitals reviewed.        Assessment/Plan   Honey was seen today for establish care.    Diagnoses and all orders for this visit:    Anxiety  -     sertraline (ZOLOFT) 100 MG tablet; Take 1/2 tab daily for one week, then 1 daily for 2 weeks, then 1 1/2 tab daily  -     Ambulatory Referral to Psychology    Discuss extended office hours and appropriate use of the ER. Discussed no controlled substances prescribed from this office. Appropriate referrals will be made to pain management and psychiatry if needed. Stressed the importance and expectation of medical compliance with plan of care, medications, and follow up appointments.  Discussed the nature of the disease including, risks, complications, implications, management, safe and proper use of medications. Encouraged therapeutic lifestyle changes including low calorie diet with plenty of fruits and vegetables, daily exercise, medication compliance, and keeping scheduled follow up appointments with me and any other providers. Encouraged patient to have appointment for complete physical, fasting labs, appropriate screenings, and immunizations on an annual basis.  Follow up in 4 weeks.

## 2019-06-26 ENCOUNTER — OFFICE VISIT (OUTPATIENT)
Dept: FAMILY MEDICINE CLINIC | Facility: CLINIC | Age: 34
End: 2019-06-26

## 2019-06-26 VITALS
HEIGHT: 66 IN | OXYGEN SATURATION: 97 % | SYSTOLIC BLOOD PRESSURE: 118 MMHG | HEART RATE: 76 BPM | WEIGHT: 164 LBS | BODY MASS INDEX: 26.36 KG/M2 | TEMPERATURE: 97.1 F | DIASTOLIC BLOOD PRESSURE: 70 MMHG | RESPIRATION RATE: 18 BRPM

## 2019-06-26 DIAGNOSIS — F41.9 ANXIETY: Primary | ICD-10-CM

## 2019-06-26 PROCEDURE — 99212 OFFICE O/P EST SF 10 MIN: CPT | Performed by: NURSE PRACTITIONER

## 2019-06-26 RX ORDER — SERTRALINE HYDROCHLORIDE 100 MG/1
TABLET, FILM COATED ORAL
Qty: 45 TABLET | Refills: 7 | Status: SHIPPED | OUTPATIENT
Start: 2019-06-26 | End: 2020-01-29 | Stop reason: SDUPTHER

## 2019-06-26 NOTE — PROGRESS NOTES
Subjective   Honey Thomas is a 33 y.o. female.     History of Present Illness Here to follow up on anxiety. Doing great on Sertraline. No side effects. She is weaning her child and started her menstrual cycle. Feels she is less stressed and overwhelmed. Would like to stay on the medication.    Outpatient Encounter Medications as of 6/26/2019   Medication Sig Dispense Refill   • norethindrone (MICRONOR) 0.35 MG tablet Take 1 tablet by mouth Daily.  1   • [DISCONTINUED] sertraline (ZOLOFT) 100 MG tablet Take 1/2 tab daily for one week, then 1 daily for 2 weeks, then 1 1/2 tab daily 45 tablet 5   • sertraline (ZOLOFT) 100 MG tablet Take 1 1/2 daily 45 tablet 7   • [DISCONTINUED] vitamin D (ERGOCALCIFEROL) 69030 units capsule capsule Take 50,000 Units by mouth 1 (One) Time Per Week.       No facility-administered encounter medications on file as of 6/26/2019.        The following portions of the patient's history were reviewed and updated as appropriate: allergies, current medications, past family history, past medical history, past social history, past surgical history and problem list.    Review of Systems   Constitutional: Negative for appetite change, fever, unexpected weight gain and unexpected weight loss.   HENT: Negative for congestion, nosebleeds, sore throat and trouble swallowing.    Eyes: Negative for visual disturbance.   Respiratory: Negative for cough, shortness of breath and wheezing.    Cardiovascular: Negative for chest pain, palpitations and leg swelling.   Gastrointestinal: Negative for abdominal pain, blood in stool, constipation, diarrhea, nausea and vomiting.   Endocrine: Negative for polydipsia, polyphagia and polyuria.   Genitourinary: Negative for dysuria, frequency and hematuria.   Musculoskeletal: Negative for arthralgias, joint swelling and myalgias.   Skin: Negative for rash.   Neurological: Negative for dizziness, seizures, syncope and numbness.   Hematological: Negative for adenopathy. Does  "not bruise/bleed easily.   Psychiatric/Behavioral: Negative for behavioral problems, sleep disturbance and depressed mood. The patient is not nervous/anxious.        Objective     Visit Vitals  /70 (BP Location: Left arm, Patient Position: Sitting)   Pulse 76   Temp 97.1 °F (36.2 °C) (Temporal)   Resp 18   Ht 167.6 cm (66\")   Wt 74.4 kg (164 lb)   SpO2 97%   Breastfeeding? Yes   BMI 26.47 kg/m²       Physical Exam   Constitutional: She is oriented to person, place, and time. She appears well-developed and well-nourished. No distress.   HENT:   Head: Normocephalic and atraumatic.   Right Ear: External ear normal.   Left Ear: External ear normal.   Nose: Nose normal.   Eyes: Conjunctivae are normal. Right eye exhibits no discharge. Left eye exhibits no discharge. No scleral icterus.   Neck: Normal range of motion.   Cardiovascular: Normal rate.   Pulmonary/Chest: Effort normal. No respiratory distress.   Musculoskeletal: She exhibits no deformity.   Neurological: She is alert and oriented to person, place, and time. Coordination normal.   Skin: Skin is warm and dry.   Psychiatric: She has a normal mood and affect. Her behavior is normal. Judgment and thought content normal.   Vitals reviewed.        Assessment/Plan   Honey was seen today for anxiety.    Diagnoses and all orders for this visit:    Anxiety  -     sertraline (ZOLOFT) 100 MG tablet; Take 1 1/2 daily    Discussed the nature of the disease including, risks, complications, implications, management, safe and proper use of medications. Encouraged therapeutic lifestyle changes including low calorie diet with plenty of fruits and vegetables, daily exercise, medication compliance, and keeping scheduled follow up appointments with me and any other providers. Encouraged patient to have appointment for complete physical, fasting labs, appropriate screenings, and immunizations on an annual basis.  Follow up symptoms persist or worsen or go to ER.             "

## 2019-10-25 ENCOUNTER — TRANSCRIBE ORDERS (OUTPATIENT)
Dept: LAB | Facility: HOSPITAL | Age: 34
End: 2019-10-25

## 2019-10-25 ENCOUNTER — LAB (OUTPATIENT)
Dept: LAB | Facility: HOSPITAL | Age: 34
End: 2019-10-25

## 2019-10-25 ENCOUNTER — APPOINTMENT (OUTPATIENT)
Dept: LAB | Facility: HOSPITAL | Age: 34
End: 2019-10-25

## 2019-10-25 DIAGNOSIS — Z01.419 ROUTINE GYNECOLOGICAL EXAMINATION: ICD-10-CM

## 2019-10-25 DIAGNOSIS — Z01.419 ROUTINE GYNECOLOGICAL EXAMINATION: Primary | ICD-10-CM

## 2019-10-25 LAB
25(OH)D3 SERPL-MCNC: 34.1 NG/ML (ref 30–100)
ALBUMIN SERPL-MCNC: 4.2 G/DL (ref 3.5–5.2)
ALBUMIN/GLOB SERPL: 1.8 G/DL
ALP SERPL-CCNC: 66 U/L (ref 39–117)
ALT SERPL W P-5'-P-CCNC: 17 U/L (ref 1–33)
ANION GAP SERPL CALCULATED.3IONS-SCNC: 14.6 MMOL/L (ref 5–15)
AST SERPL-CCNC: 24 U/L (ref 1–32)
BILIRUB SERPL-MCNC: 0.3 MG/DL (ref 0.2–1.2)
BUN BLD-MCNC: 6 MG/DL (ref 6–20)
BUN/CREAT SERPL: 9 (ref 7–25)
CALCIUM SPEC-SCNC: 8.7 MG/DL (ref 8.6–10.5)
CHLORIDE SERPL-SCNC: 96 MMOL/L (ref 98–107)
CHOLEST SERPL-MCNC: 145 MG/DL (ref 0–200)
CO2 SERPL-SCNC: 25.4 MMOL/L (ref 22–29)
CREAT BLD-MCNC: 0.67 MG/DL (ref 0.57–1)
DEPRECATED RDW RBC AUTO: 40.4 FL (ref 37–54)
ERYTHROCYTE [DISTWIDTH] IN BLOOD BY AUTOMATED COUNT: 12.5 % (ref 12.3–15.4)
GFR SERPL CREATININE-BSD FRML MDRD: 100 ML/MIN/1.73
GLOBULIN UR ELPH-MCNC: 2.4 GM/DL
GLUCOSE BLD-MCNC: 87 MG/DL (ref 65–99)
HCT VFR BLD AUTO: 38.1 % (ref 34–46.6)
HDLC SERPL-MCNC: 34 MG/DL (ref 40–60)
HGB BLD-MCNC: 13.2 G/DL (ref 12–15.9)
LDLC SERPL CALC-MCNC: 87 MG/DL (ref 0–100)
LDLC/HDLC SERPL: 2.55 {RATIO}
MCH RBC QN AUTO: 30.8 PG (ref 26.6–33)
MCHC RBC AUTO-ENTMCNC: 34.6 G/DL (ref 31.5–35.7)
MCV RBC AUTO: 89 FL (ref 79–97)
PLATELET # BLD AUTO: 211 10*3/MM3 (ref 140–450)
PMV BLD AUTO: 11 FL (ref 6–12)
POTASSIUM BLD-SCNC: 3.3 MMOL/L (ref 3.5–5.2)
PROT SERPL-MCNC: 6.6 G/DL (ref 6–8.5)
RBC # BLD AUTO: 4.28 10*6/MM3 (ref 3.77–5.28)
SODIUM BLD-SCNC: 136 MMOL/L (ref 136–145)
TRIGL SERPL-MCNC: 121 MG/DL (ref 0–150)
TSH SERPL DL<=0.05 MIU/L-ACNC: 1.26 UIU/ML (ref 0.27–4.2)
VLDLC SERPL-MCNC: 24.2 MG/DL (ref 5–40)
WBC NRBC COR # BLD: 4.9 10*3/MM3 (ref 3.4–10.8)

## 2019-10-25 PROCEDURE — 36415 COLL VENOUS BLD VENIPUNCTURE: CPT

## 2019-10-25 PROCEDURE — 84443 ASSAY THYROID STIM HORMONE: CPT

## 2019-10-25 PROCEDURE — 82306 VITAMIN D 25 HYDROXY: CPT

## 2019-10-25 PROCEDURE — 80053 COMPREHEN METABOLIC PANEL: CPT

## 2019-10-25 PROCEDURE — 80061 LIPID PANEL: CPT

## 2019-10-25 PROCEDURE — 85027 COMPLETE CBC AUTOMATED: CPT

## 2019-11-13 ENCOUNTER — TELEPHONE (OUTPATIENT)
Dept: FAMILY MEDICINE CLINIC | Facility: CLINIC | Age: 34
End: 2019-11-13

## 2019-11-13 RX ORDER — VALACYCLOVIR HYDROCHLORIDE 1 G/1
2000 TABLET, FILM COATED ORAL 2 TIMES DAILY
Qty: 4 TABLET | Refills: 1 | Status: SHIPPED | OUTPATIENT
Start: 2019-11-13 | End: 2020-11-02

## 2019-11-13 NOTE — TELEPHONE ENCOUNTER
Pt states that she had a fever blister and requested Valtrex sent into [facility] on [address] if possible.    Honey can be reached at [phone]

## 2019-11-21 ENCOUNTER — PREP FOR SURGERY (OUTPATIENT)
Dept: OTHER | Facility: HOSPITAL | Age: 34
End: 2019-11-21

## 2019-11-21 DIAGNOSIS — N92.0 MENORRHAGIA: Primary | ICD-10-CM

## 2019-11-21 RX ORDER — SCOLOPAMINE TRANSDERMAL SYSTEM 1 MG/1
1 PATCH, EXTENDED RELEASE TRANSDERMAL ONCE
Status: CANCELLED | OUTPATIENT
Start: 2019-11-21 | End: 2019-11-21

## 2019-11-21 RX ORDER — CEFAZOLIN SODIUM 2 G/100ML
2 INJECTION, SOLUTION INTRAVENOUS ONCE
Status: CANCELLED | OUTPATIENT
Start: 2019-11-21 | End: 2019-11-21

## 2019-11-21 RX ORDER — HEPARIN SODIUM 5000 [USP'U]/ML
5000 INJECTION, SOLUTION INTRAVENOUS; SUBCUTANEOUS ONCE
Status: CANCELLED | OUTPATIENT
Start: 2019-11-21 | End: 2019-11-21

## 2019-11-22 ENCOUNTER — PREP FOR SURGERY (OUTPATIENT)
Dept: OTHER | Facility: HOSPITAL | Age: 34
End: 2019-11-22

## 2019-11-22 ENCOUNTER — APPOINTMENT (OUTPATIENT)
Dept: PREADMISSION TESTING | Facility: HOSPITAL | Age: 34
End: 2019-11-22

## 2019-11-22 VITALS — HEIGHT: 66 IN | WEIGHT: 162.4 LBS | BODY MASS INDEX: 26.1 KG/M2

## 2019-11-22 DIAGNOSIS — N92.0 MENORRHAGIA: ICD-10-CM

## 2019-11-22 LAB
ANION GAP SERPL CALCULATED.3IONS-SCNC: 12 MMOL/L (ref 5–15)
B-HCG UR QL: NEGATIVE
BACTERIA UR QL AUTO: ABNORMAL /HPF
BILIRUB UR QL STRIP: NEGATIVE
BUN BLD-MCNC: 9 MG/DL (ref 6–20)
BUN/CREAT SERPL: 12.7 (ref 7–25)
CALCIUM SPEC-SCNC: 9.6 MG/DL (ref 8.6–10.5)
CHLORIDE SERPL-SCNC: 102 MMOL/L (ref 98–107)
CLARITY UR: ABNORMAL
CO2 SERPL-SCNC: 25 MMOL/L (ref 22–29)
COLOR UR: YELLOW
CREAT BLD-MCNC: 0.71 MG/DL (ref 0.57–1)
DEPRECATED RDW RBC AUTO: 45.1 FL (ref 37–54)
ERYTHROCYTE [DISTWIDTH] IN BLOOD BY AUTOMATED COUNT: 13.3 % (ref 12.3–15.4)
GFR SERPL CREATININE-BSD FRML MDRD: 93 ML/MIN/1.73
GLUCOSE BLD-MCNC: 93 MG/DL (ref 65–99)
GLUCOSE UR STRIP-MCNC: NEGATIVE MG/DL
HBA1C MFR BLD: 5.5 % (ref 4.8–5.6)
HCT VFR BLD AUTO: 43.2 % (ref 34–46.6)
HGB BLD-MCNC: 14 G/DL (ref 12–15.9)
HGB UR QL STRIP.AUTO: NEGATIVE
HYALINE CASTS UR QL AUTO: ABNORMAL /LPF
KETONES UR QL STRIP: NEGATIVE
LEUKOCYTE ESTERASE UR QL STRIP.AUTO: ABNORMAL
MCH RBC QN AUTO: 30 PG (ref 26.6–33)
MCHC RBC AUTO-ENTMCNC: 32.4 G/DL (ref 31.5–35.7)
MCV RBC AUTO: 92.5 FL (ref 79–97)
NITRITE UR QL STRIP: NEGATIVE
PH UR STRIP.AUTO: 6.5 [PH] (ref 5–8)
PLATELET # BLD AUTO: 279 10*3/MM3 (ref 140–450)
PMV BLD AUTO: 9.9 FL (ref 6–12)
POTASSIUM BLD-SCNC: 4.5 MMOL/L (ref 3.5–5.2)
PROT UR QL STRIP: NEGATIVE
RBC # BLD AUTO: 4.67 10*6/MM3 (ref 3.77–5.28)
RBC # UR: ABNORMAL /HPF
REF LAB TEST METHOD: ABNORMAL
SODIUM BLD-SCNC: 139 MMOL/L (ref 136–145)
SP GR UR STRIP: <=1.005 (ref 1–1.03)
SQUAMOUS #/AREA URNS HPF: ABNORMAL /HPF
UROBILINOGEN UR QL STRIP: ABNORMAL
WBC NRBC COR # BLD: 8.22 10*3/MM3 (ref 3.4–10.8)
WBC UR QL AUTO: ABNORMAL /HPF
YEAST URNS QL MICRO: ABNORMAL /HPF

## 2019-11-22 PROCEDURE — 87086 URINE CULTURE/COLONY COUNT: CPT | Performed by: OBSTETRICS & GYNECOLOGY

## 2019-11-22 PROCEDURE — 81001 URINALYSIS AUTO W/SCOPE: CPT | Performed by: OBSTETRICS & GYNECOLOGY

## 2019-11-22 PROCEDURE — 36415 COLL VENOUS BLD VENIPUNCTURE: CPT

## 2019-11-22 PROCEDURE — 80048 BASIC METABOLIC PNL TOTAL CA: CPT | Performed by: PLASTIC SURGERY

## 2019-11-22 PROCEDURE — 83036 HEMOGLOBIN GLYCOSYLATED A1C: CPT | Performed by: ANESTHESIOLOGY

## 2019-11-22 PROCEDURE — 85027 COMPLETE CBC AUTOMATED: CPT | Performed by: OBSTETRICS & GYNECOLOGY

## 2019-11-22 PROCEDURE — 81025 URINE PREGNANCY TEST: CPT | Performed by: OBSTETRICS & GYNECOLOGY

## 2019-11-22 NOTE — PAT
Patient did not want to wear band all weekend. Please obtain specimen in pre-op on the day of surgery.    Patient to apply Chlorhexadine wipes  to surgical area (as instructed) the night before procedure and the AM of procedure. Wipes provided.    ERAS orders not given to patient because it was not ordered by both physicians performing the procedures.

## 2019-11-24 ENCOUNTER — ANESTHESIA EVENT (OUTPATIENT)
Dept: PERIOP | Facility: HOSPITAL | Age: 34
End: 2019-11-24

## 2019-11-24 LAB — BACTERIA SPEC AEROBE CULT: NO GROWTH

## 2019-11-24 RX ORDER — FAMOTIDINE 10 MG/ML
20 INJECTION, SOLUTION INTRAVENOUS ONCE
Status: CANCELLED | OUTPATIENT
Start: 2019-11-24 | End: 2019-11-24

## 2019-11-24 RX ORDER — SODIUM CHLORIDE 0.9 % (FLUSH) 0.9 %
3-10 SYRINGE (ML) INJECTION AS NEEDED
Status: CANCELLED | OUTPATIENT
Start: 2019-11-24

## 2019-11-24 RX ORDER — SODIUM CHLORIDE 0.9 % (FLUSH) 0.9 %
3 SYRINGE (ML) INJECTION EVERY 12 HOURS SCHEDULED
Status: CANCELLED | OUTPATIENT
Start: 2019-11-24

## 2019-11-25 ENCOUNTER — ANESTHESIA (OUTPATIENT)
Dept: PERIOP | Facility: HOSPITAL | Age: 34
End: 2019-11-25

## 2019-11-25 ENCOUNTER — HOSPITAL ENCOUNTER (OUTPATIENT)
Facility: HOSPITAL | Age: 34
Discharge: HOME | End: 2019-11-26
Attending: OBSTETRICS & GYNECOLOGY | Admitting: OBSTETRICS & GYNECOLOGY

## 2019-11-25 DIAGNOSIS — N92.0 MENORRHAGIA: ICD-10-CM

## 2019-11-25 DIAGNOSIS — N93.9 MENSTRUAL BLEEDING PROBLEM: ICD-10-CM

## 2019-11-25 LAB
ABO GROUP BLD: NORMAL
B-HCG UR QL: NEGATIVE
BLD GP AB SCN SERPL QL: NEGATIVE
INTERNAL NEGATIVE CONTROL: NEGATIVE
INTERNAL POSITIVE CONTROL: POSITIVE
Lab: NORMAL
RH BLD: NEGATIVE
T&S EXPIRATION DATE: NORMAL

## 2019-11-25 PROCEDURE — 25010000002 PROPOFOL 10 MG/ML EMULSION: Performed by: NURSE ANESTHETIST, CERTIFIED REGISTERED

## 2019-11-25 PROCEDURE — 25010000003 CEFAZOLIN IN DEXTROSE 2-4 GM/100ML-% SOLUTION: Performed by: OBSTETRICS & GYNECOLOGY

## 2019-11-25 PROCEDURE — 25010000002 MIDAZOLAM PER 1 MG: Performed by: NURSE ANESTHETIST, CERTIFIED REGISTERED

## 2019-11-25 PROCEDURE — 25010000002 ONDANSETRON PER 1 MG: Performed by: NURSE ANESTHETIST, CERTIFIED REGISTERED

## 2019-11-25 PROCEDURE — 25010000002 DEXAMETHASONE SODIUM PHOSPHATE 10 MG/ML SOLUTION: Performed by: ANESTHESIOLOGY

## 2019-11-25 PROCEDURE — 88307 TISSUE EXAM BY PATHOLOGIST: CPT | Performed by: OBSTETRICS & GYNECOLOGY

## 2019-11-25 PROCEDURE — 25010000003 CEFAZOLIN IN DEXTROSE 2-4 GM/100ML-% SOLUTION: Performed by: PLASTIC SURGERY

## 2019-11-25 PROCEDURE — 86900 BLOOD TYPING SEROLOGIC ABO: CPT | Performed by: OBSTETRICS & GYNECOLOGY

## 2019-11-25 PROCEDURE — 25010000002 HYDROMORPHONE PER 4 MG: Performed by: OBSTETRICS & GYNECOLOGY

## 2019-11-25 PROCEDURE — 25010000002 DEXAMETHASONE PER 1 MG: Performed by: NURSE ANESTHETIST, CERTIFIED REGISTERED

## 2019-11-25 PROCEDURE — 25010000003 CEFAZOLIN PER 500 MG: Performed by: NURSE ANESTHETIST, CERTIFIED REGISTERED

## 2019-11-25 PROCEDURE — 25010000002 ONDANSETRON PER 1 MG: Performed by: OBSTETRICS & GYNECOLOGY

## 2019-11-25 PROCEDURE — C1789 PROSTHESIS, BREAST, IMP: HCPCS | Performed by: OBSTETRICS & GYNECOLOGY

## 2019-11-25 PROCEDURE — 86850 RBC ANTIBODY SCREEN: CPT | Performed by: OBSTETRICS & GYNECOLOGY

## 2019-11-25 PROCEDURE — 86901 BLOOD TYPING SEROLOGIC RH(D): CPT | Performed by: OBSTETRICS & GYNECOLOGY

## 2019-11-25 PROCEDURE — 25010000002 BUPRENORPHINE PER 0.1 MG: Performed by: ANESTHESIOLOGY

## 2019-11-25 PROCEDURE — 81025 URINE PREGNANCY TEST: CPT | Performed by: ANESTHESIOLOGY

## 2019-11-25 PROCEDURE — 25010000002 FENTANYL CITRATE (PF) 100 MCG/2ML SOLUTION: Performed by: NURSE ANESTHETIST, CERTIFIED REGISTERED

## 2019-11-25 DEVICE — IMPLANTABLE DEVICE: Type: IMPLANTABLE DEVICE | Site: BREAST | Status: FUNCTIONAL

## 2019-11-25 RX ORDER — HYDROMORPHONE HYDROCHLORIDE 1 MG/ML
0.5 INJECTION, SOLUTION INTRAMUSCULAR; INTRAVENOUS; SUBCUTANEOUS
Status: DISCONTINUED | OUTPATIENT
Start: 2019-11-25 | End: 2019-11-26 | Stop reason: HOSPADM

## 2019-11-25 RX ORDER — ACETAMINOPHEN 325 MG/1
650 TABLET ORAL EVERY 4 HOURS PRN
Status: DISCONTINUED | OUTPATIENT
Start: 2019-11-25 | End: 2019-11-26 | Stop reason: HOSPADM

## 2019-11-25 RX ORDER — OXYCODONE AND ACETAMINOPHEN 7.5; 325 MG/1; MG/1
2 TABLET ORAL EVERY 4 HOURS PRN
Status: DISCONTINUED | OUTPATIENT
Start: 2019-11-25 | End: 2019-11-26 | Stop reason: HOSPADM

## 2019-11-25 RX ORDER — SODIUM CHLORIDE 9 MG/ML
INJECTION, SOLUTION INTRAVENOUS AS NEEDED
Status: DISCONTINUED | OUTPATIENT
Start: 2019-11-25 | End: 2019-11-25 | Stop reason: HOSPADM

## 2019-11-25 RX ORDER — DEXAMETHASONE SODIUM PHOSPHATE 4 MG/ML
INJECTION, SOLUTION INTRA-ARTICULAR; INTRALESIONAL; INTRAMUSCULAR; INTRAVENOUS; SOFT TISSUE AS NEEDED
Status: DISCONTINUED | OUTPATIENT
Start: 2019-11-25 | End: 2019-11-25 | Stop reason: SURG

## 2019-11-25 RX ORDER — PROMETHAZINE HYDROCHLORIDE 25 MG/1
25 TABLET ORAL ONCE AS NEEDED
Status: DISCONTINUED | OUTPATIENT
Start: 2019-11-25 | End: 2019-11-25 | Stop reason: HOSPADM

## 2019-11-25 RX ORDER — MIDAZOLAM HYDROCHLORIDE 1 MG/ML
INJECTION INTRAMUSCULAR; INTRAVENOUS AS NEEDED
Status: DISCONTINUED | OUTPATIENT
Start: 2019-11-25 | End: 2019-11-25 | Stop reason: SURG

## 2019-11-25 RX ORDER — NALOXONE HCL 0.4 MG/ML
0.4 VIAL (ML) INJECTION
Status: DISCONTINUED | OUTPATIENT
Start: 2019-11-25 | End: 2019-11-26 | Stop reason: HOSPADM

## 2019-11-25 RX ORDER — ACETAMINOPHEN 325 MG/1
650 TABLET ORAL ONCE AS NEEDED
Status: DISCONTINUED | OUTPATIENT
Start: 2019-11-25 | End: 2019-11-25 | Stop reason: HOSPADM

## 2019-11-25 RX ORDER — PROMETHAZINE HYDROCHLORIDE 12.5 MG/1
12.5 SUPPOSITORY RECTAL EVERY 6 HOURS PRN
Status: DISCONTINUED | OUTPATIENT
Start: 2019-11-25 | End: 2019-11-26 | Stop reason: HOSPADM

## 2019-11-25 RX ORDER — CEFAZOLIN SODIUM 2 G/100ML
2 INJECTION, SOLUTION INTRAVENOUS ONCE
Status: COMPLETED | OUTPATIENT
Start: 2019-11-25 | End: 2019-11-25

## 2019-11-25 RX ORDER — DIAZEPAM 5 MG/1
5 TABLET ORAL EVERY 8 HOURS PRN
Status: DISCONTINUED | OUTPATIENT
Start: 2019-11-25 | End: 2019-11-26 | Stop reason: HOSPADM

## 2019-11-25 RX ORDER — ONDANSETRON 2 MG/ML
INJECTION INTRAMUSCULAR; INTRAVENOUS AS NEEDED
Status: DISCONTINUED | OUTPATIENT
Start: 2019-11-25 | End: 2019-11-25 | Stop reason: SURG

## 2019-11-25 RX ORDER — LIDOCAINE HYDROCHLORIDE 10 MG/ML
INJECTION, SOLUTION EPIDURAL; INFILTRATION; INTRACAUDAL; PERINEURAL AS NEEDED
Status: DISCONTINUED | OUTPATIENT
Start: 2019-11-25 | End: 2019-11-25 | Stop reason: SURG

## 2019-11-25 RX ORDER — BUPRENORPHINE HYDROCHLORIDE 0.32 MG/ML
INJECTION INTRAMUSCULAR; INTRAVENOUS
Status: COMPLETED | OUTPATIENT
Start: 2019-11-25 | End: 2019-11-25

## 2019-11-25 RX ORDER — LIDOCAINE HYDROCHLORIDE 10 MG/ML
0.5 INJECTION, SOLUTION EPIDURAL; INFILTRATION; INTRACAUDAL; PERINEURAL ONCE AS NEEDED
Status: COMPLETED | OUTPATIENT
Start: 2019-11-25 | End: 2019-11-25

## 2019-11-25 RX ORDER — VECURONIUM BROMIDE 1 MG/ML
INJECTION, POWDER, LYOPHILIZED, FOR SOLUTION INTRAVENOUS AS NEEDED
Status: DISCONTINUED | OUTPATIENT
Start: 2019-11-25 | End: 2019-11-25 | Stop reason: SURG

## 2019-11-25 RX ORDER — HYDROMORPHONE HYDROCHLORIDE 1 MG/ML
0.5 INJECTION, SOLUTION INTRAMUSCULAR; INTRAVENOUS; SUBCUTANEOUS
Status: DISCONTINUED | OUTPATIENT
Start: 2019-11-25 | End: 2019-11-25 | Stop reason: HOSPADM

## 2019-11-25 RX ORDER — CEFAZOLIN SODIUM 1 G/3ML
INJECTION, POWDER, FOR SOLUTION INTRAMUSCULAR; INTRAVENOUS AS NEEDED
Status: DISCONTINUED | OUTPATIENT
Start: 2019-11-25 | End: 2019-11-25 | Stop reason: SURG

## 2019-11-25 RX ORDER — PROMETHAZINE HYDROCHLORIDE 25 MG/ML
6.25 INJECTION, SOLUTION INTRAMUSCULAR; INTRAVENOUS ONCE AS NEEDED
Status: DISCONTINUED | OUTPATIENT
Start: 2019-11-25 | End: 2019-11-25 | Stop reason: HOSPADM

## 2019-11-25 RX ORDER — IBUPROFEN 400 MG/1
400 TABLET ORAL EVERY 6 HOURS PRN
Status: DISCONTINUED | OUTPATIENT
Start: 2019-11-25 | End: 2019-11-26 | Stop reason: HOSPADM

## 2019-11-25 RX ORDER — PROMETHAZINE HYDROCHLORIDE 25 MG/1
25 SUPPOSITORY RECTAL ONCE AS NEEDED
Status: DISCONTINUED | OUTPATIENT
Start: 2019-11-25 | End: 2019-11-25 | Stop reason: HOSPADM

## 2019-11-25 RX ORDER — SCOLOPAMINE TRANSDERMAL SYSTEM 1 MG/1
1 PATCH, EXTENDED RELEASE TRANSDERMAL ONCE
Status: DISCONTINUED | OUTPATIENT
Start: 2019-11-25 | End: 2019-11-25

## 2019-11-25 RX ORDER — HEPARIN SODIUM 5000 [USP'U]/ML
5000 INJECTION, SOLUTION INTRAVENOUS; SUBCUTANEOUS ONCE
Status: DISCONTINUED | OUTPATIENT
Start: 2019-11-25 | End: 2019-11-25 | Stop reason: HOSPADM

## 2019-11-25 RX ORDER — ONDANSETRON 4 MG/1
4 TABLET, FILM COATED ORAL EVERY 6 HOURS PRN
Status: DISCONTINUED | OUTPATIENT
Start: 2019-11-25 | End: 2019-11-26 | Stop reason: HOSPADM

## 2019-11-25 RX ORDER — FENTANYL CITRATE 50 UG/ML
INJECTION, SOLUTION INTRAMUSCULAR; INTRAVENOUS AS NEEDED
Status: DISCONTINUED | OUTPATIENT
Start: 2019-11-25 | End: 2019-11-25 | Stop reason: SURG

## 2019-11-25 RX ORDER — IBUPROFEN 600 MG/1
600 TABLET ORAL ONCE AS NEEDED
Status: DISCONTINUED | OUTPATIENT
Start: 2019-11-25 | End: 2019-11-25 | Stop reason: HOSPADM

## 2019-11-25 RX ORDER — PROMETHAZINE HYDROCHLORIDE 25 MG/ML
12.5 INJECTION, SOLUTION INTRAMUSCULAR; INTRAVENOUS EVERY 6 HOURS PRN
Status: DISCONTINUED | OUTPATIENT
Start: 2019-11-25 | End: 2019-11-26 | Stop reason: HOSPADM

## 2019-11-25 RX ORDER — FENTANYL CITRATE 50 UG/ML
50 INJECTION, SOLUTION INTRAMUSCULAR; INTRAVENOUS
Status: DISCONTINUED | OUTPATIENT
Start: 2019-11-25 | End: 2019-11-25 | Stop reason: HOSPADM

## 2019-11-25 RX ORDER — MEPERIDINE HYDROCHLORIDE 25 MG/ML
12.5 INJECTION INTRAMUSCULAR; INTRAVENOUS; SUBCUTANEOUS
Status: DISCONTINUED | OUTPATIENT
Start: 2019-11-25 | End: 2019-11-25 | Stop reason: HOSPADM

## 2019-11-25 RX ORDER — PROPOFOL 10 MG/ML
VIAL (ML) INTRAVENOUS AS NEEDED
Status: DISCONTINUED | OUTPATIENT
Start: 2019-11-25 | End: 2019-11-25 | Stop reason: SURG

## 2019-11-25 RX ORDER — ONDANSETRON 2 MG/ML
4 INJECTION INTRAMUSCULAR; INTRAVENOUS ONCE AS NEEDED
Status: DISCONTINUED | OUTPATIENT
Start: 2019-11-25 | End: 2019-11-25 | Stop reason: HOSPADM

## 2019-11-25 RX ORDER — MAGNESIUM HYDROXIDE 1200 MG/15ML
LIQUID ORAL AS NEEDED
Status: DISCONTINUED | OUTPATIENT
Start: 2019-11-25 | End: 2019-11-25 | Stop reason: HOSPADM

## 2019-11-25 RX ORDER — ACETAMINOPHEN 160 MG/5ML
650 SOLUTION ORAL EVERY 4 HOURS PRN
Status: DISCONTINUED | OUTPATIENT
Start: 2019-11-25 | End: 2019-11-26 | Stop reason: HOSPADM

## 2019-11-25 RX ORDER — OXYCODONE AND ACETAMINOPHEN 10; 325 MG/1; MG/1
1 TABLET ORAL EVERY 4 HOURS PRN
Status: DISCONTINUED | OUTPATIENT
Start: 2019-11-25 | End: 2019-11-25 | Stop reason: HOSPADM

## 2019-11-25 RX ORDER — GLYCOPYRROLATE 0.2 MG/ML
INJECTION INTRAMUSCULAR; INTRAVENOUS AS NEEDED
Status: DISCONTINUED | OUTPATIENT
Start: 2019-11-25 | End: 2019-11-25 | Stop reason: SURG

## 2019-11-25 RX ORDER — DEXAMETHASONE SODIUM PHOSPHATE 10 MG/ML
INJECTION, SOLUTION INTRAMUSCULAR; INTRAVENOUS
Status: COMPLETED | OUTPATIENT
Start: 2019-11-25 | End: 2019-11-25

## 2019-11-25 RX ORDER — ACETAMINOPHEN 650 MG/1
650 SUPPOSITORY RECTAL ONCE AS NEEDED
Status: DISCONTINUED | OUTPATIENT
Start: 2019-11-25 | End: 2019-11-25 | Stop reason: HOSPADM

## 2019-11-25 RX ORDER — PROMETHAZINE HYDROCHLORIDE 12.5 MG/1
12.5 TABLET ORAL EVERY 6 HOURS PRN
Status: DISCONTINUED | OUTPATIENT
Start: 2019-11-25 | End: 2019-11-26 | Stop reason: HOSPADM

## 2019-11-25 RX ORDER — HYDROCODONE BITARTRATE AND ACETAMINOPHEN 5; 325 MG/1; MG/1
1 TABLET ORAL EVERY 4 HOURS PRN
Status: DISCONTINUED | OUTPATIENT
Start: 2019-11-25 | End: 2019-11-26 | Stop reason: HOSPADM

## 2019-11-25 RX ORDER — SIMETHICONE 80 MG
80 TABLET,CHEWABLE ORAL 4 TIMES DAILY PRN
Status: DISCONTINUED | OUTPATIENT
Start: 2019-11-25 | End: 2019-11-26 | Stop reason: HOSPADM

## 2019-11-25 RX ORDER — OXYCODONE AND ACETAMINOPHEN 7.5; 325 MG/1; MG/1
1 TABLET ORAL ONCE AS NEEDED
Status: DISCONTINUED | OUTPATIENT
Start: 2019-11-25 | End: 2019-11-25 | Stop reason: HOSPADM

## 2019-11-25 RX ORDER — ROCURONIUM BROMIDE 10 MG/ML
INJECTION, SOLUTION INTRAVENOUS AS NEEDED
Status: DISCONTINUED | OUTPATIENT
Start: 2019-11-25 | End: 2019-11-25 | Stop reason: SURG

## 2019-11-25 RX ORDER — SODIUM CHLORIDE, SODIUM LACTATE, POTASSIUM CHLORIDE, CALCIUM CHLORIDE 600; 310; 30; 20 MG/100ML; MG/100ML; MG/100ML; MG/100ML
9 INJECTION, SOLUTION INTRAVENOUS CONTINUOUS
Status: DISCONTINUED | OUTPATIENT
Start: 2019-11-25 | End: 2019-11-26 | Stop reason: HOSPADM

## 2019-11-25 RX ORDER — IPRATROPIUM BROMIDE AND ALBUTEROL SULFATE 2.5; .5 MG/3ML; MG/3ML
3 SOLUTION RESPIRATORY (INHALATION) ONCE AS NEEDED
Status: DISCONTINUED | OUTPATIENT
Start: 2019-11-25 | End: 2019-11-25 | Stop reason: HOSPADM

## 2019-11-25 RX ORDER — BUPIVACAINE HYDROCHLORIDE 2.5 MG/ML
INJECTION, SOLUTION EPIDURAL; INFILTRATION; INTRACAUDAL
Status: COMPLETED | OUTPATIENT
Start: 2019-11-25 | End: 2019-11-25

## 2019-11-25 RX ORDER — LIDOCAINE HYDROCHLORIDE AND EPINEPHRINE 10; 10 MG/ML; UG/ML
INJECTION, SOLUTION INFILTRATION; PERINEURAL AS NEEDED
Status: DISCONTINUED | OUTPATIENT
Start: 2019-11-25 | End: 2019-11-25 | Stop reason: HOSPADM

## 2019-11-25 RX ORDER — LIDOCAINE HYDROCHLORIDE 40 MG/ML
SOLUTION TOPICAL AS NEEDED
Status: DISCONTINUED | OUTPATIENT
Start: 2019-11-25 | End: 2019-11-25 | Stop reason: SURG

## 2019-11-25 RX ORDER — ACETAMINOPHEN 650 MG/1
650 SUPPOSITORY RECTAL EVERY 4 HOURS PRN
Status: DISCONTINUED | OUTPATIENT
Start: 2019-11-25 | End: 2019-11-26 | Stop reason: HOSPADM

## 2019-11-25 RX ORDER — FAMOTIDINE 20 MG/1
20 TABLET, FILM COATED ORAL ONCE
Status: COMPLETED | OUTPATIENT
Start: 2019-11-25 | End: 2019-11-25

## 2019-11-25 RX ORDER — CEFAZOLIN SODIUM 2 G/100ML
2 INJECTION, SOLUTION INTRAVENOUS EVERY 8 HOURS
Status: DISCONTINUED | OUTPATIENT
Start: 2019-11-25 | End: 2019-11-26 | Stop reason: HOSPADM

## 2019-11-25 RX ORDER — CALCIUM CARBONATE 750 MG/1
750 TABLET, CHEWABLE ORAL 3 TIMES DAILY PRN
Status: DISCONTINUED | OUTPATIENT
Start: 2019-11-25 | End: 2019-11-26 | Stop reason: HOSPADM

## 2019-11-25 RX ORDER — ONDANSETRON 2 MG/ML
4 INJECTION INTRAMUSCULAR; INTRAVENOUS EVERY 6 HOURS PRN
Status: DISCONTINUED | OUTPATIENT
Start: 2019-11-25 | End: 2019-11-26 | Stop reason: HOSPADM

## 2019-11-25 RX ORDER — DOCUSATE SODIUM 100 MG/1
100 CAPSULE, LIQUID FILLED ORAL 2 TIMES DAILY PRN
Status: DISCONTINUED | OUTPATIENT
Start: 2019-11-25 | End: 2019-11-26 | Stop reason: HOSPADM

## 2019-11-25 RX ADMIN — BUPRENORPHINE HYDROCHLORIDE 0.3 MG: 0.32 INJECTION INTRAMUSCULAR; INTRAVENOUS at 07:57

## 2019-11-25 RX ADMIN — SODIUM CHLORIDE, POTASSIUM CHLORIDE, SODIUM LACTATE AND CALCIUM CHLORIDE: 600; 310; 30; 20 INJECTION, SOLUTION INTRAVENOUS at 10:27

## 2019-11-25 RX ADMIN — HYDROMORPHONE HYDROCHLORIDE 0.5 MG: 1 INJECTION, SOLUTION INTRAMUSCULAR; INTRAVENOUS; SUBCUTANEOUS at 17:34

## 2019-11-25 RX ADMIN — ROCURONIUM BROMIDE 50 MG: 10 INJECTION INTRAVENOUS at 07:52

## 2019-11-25 RX ADMIN — SODIUM CHLORIDE, POTASSIUM CHLORIDE, SODIUM LACTATE AND CALCIUM CHLORIDE 9 ML/HR: 600; 310; 30; 20 INJECTION, SOLUTION INTRAVENOUS at 06:38

## 2019-11-25 RX ADMIN — FENTANYL CITRATE 100 MCG: 50 INJECTION, SOLUTION INTRAMUSCULAR; INTRAVENOUS at 07:52

## 2019-11-25 RX ADMIN — PROPOFOL 200 MG: 10 INJECTION, EMULSION INTRAVENOUS at 07:52

## 2019-11-25 RX ADMIN — LIDOCAINE HYDROCHLORIDE 50 MG: 10 INJECTION, SOLUTION EPIDURAL; INFILTRATION; INTRACAUDAL; PERINEURAL at 07:52

## 2019-11-25 RX ADMIN — PROPOFOL 25 MCG/KG/MIN: 10 INJECTION, EMULSION INTRAVENOUS at 08:00

## 2019-11-25 RX ADMIN — FAMOTIDINE 20 MG: 20 TABLET ORAL at 06:38

## 2019-11-25 RX ADMIN — ONDANSETRON 4 MG: 2 INJECTION INTRAMUSCULAR; INTRAVENOUS at 13:07

## 2019-11-25 RX ADMIN — DEXAMETHASONE SODIUM PHOSPHATE 8 MG: 4 INJECTION, SOLUTION INTRAMUSCULAR; INTRAVENOUS at 08:22

## 2019-11-25 RX ADMIN — EPHEDRINE SULFATE 10 MG: 50 INJECTION INTRAMUSCULAR; INTRAVENOUS; SUBCUTANEOUS at 11:39

## 2019-11-25 RX ADMIN — VECURONIUM BROMIDE 3 MG: 1 INJECTION, POWDER, LYOPHILIZED, FOR SOLUTION INTRAVENOUS at 09:47

## 2019-11-25 RX ADMIN — EPHEDRINE SULFATE 10 MG: 50 INJECTION INTRAMUSCULAR; INTRAVENOUS; SUBCUTANEOUS at 08:33

## 2019-11-25 RX ADMIN — CEFAZOLIN 1 G: 1 INJECTION, POWDER, FOR SOLUTION INTRAVENOUS at 11:18

## 2019-11-25 RX ADMIN — HYDROCODONE BITARTRATE AND ACETAMINOPHEN 1 TABLET: 5; 325 TABLET ORAL at 21:38

## 2019-11-25 RX ADMIN — GLYCOPYRROLATE 0.2 MG: 0.2 INJECTION, SOLUTION INTRAMUSCULAR; INTRAVENOUS at 08:29

## 2019-11-25 RX ADMIN — DEXAMETHASONE SODIUM PHOSPHATE 4 MG: 10 INJECTION INTRAMUSCULAR; INTRAVENOUS at 07:57

## 2019-11-25 RX ADMIN — SODIUM CHLORIDE, POTASSIUM CHLORIDE, SODIUM LACTATE AND CALCIUM CHLORIDE: 600; 310; 30; 20 INJECTION, SOLUTION INTRAVENOUS at 12:27

## 2019-11-25 RX ADMIN — EPHEDRINE SULFATE 10 MG: 50 INJECTION INTRAMUSCULAR; INTRAVENOUS; SUBCUTANEOUS at 10:52

## 2019-11-25 RX ADMIN — EPHEDRINE SULFATE 10 MG: 50 INJECTION INTRAMUSCULAR; INTRAVENOUS; SUBCUTANEOUS at 09:08

## 2019-11-25 RX ADMIN — ONDANSETRON 4 MG: 2 INJECTION INTRAMUSCULAR; INTRAVENOUS at 17:34

## 2019-11-25 RX ADMIN — FENTANYL CITRATE 50 MCG: 0.05 INJECTION, SOLUTION INTRAMUSCULAR; INTRAVENOUS at 14:52

## 2019-11-25 RX ADMIN — LIDOCAINE HYDROCHLORIDE 1 EACH: 40 SOLUTION TOPICAL at 07:58

## 2019-11-25 RX ADMIN — CEFAZOLIN SODIUM 2 G: 2 INJECTION, SOLUTION INTRAVENOUS at 07:50

## 2019-11-25 RX ADMIN — LIDOCAINE HYDROCHLORIDE 0.2 ML: 10 INJECTION, SOLUTION EPIDURAL; INFILTRATION; INTRACAUDAL; PERINEURAL at 06:39

## 2019-11-25 RX ADMIN — EPHEDRINE SULFATE 5 MG: 50 INJECTION INTRAMUSCULAR; INTRAVENOUS; SUBCUTANEOUS at 12:35

## 2019-11-25 RX ADMIN — VECURONIUM BROMIDE 4 MG: 1 INJECTION, POWDER, LYOPHILIZED, FOR SOLUTION INTRAVENOUS at 08:31

## 2019-11-25 RX ADMIN — CEFAZOLIN SODIUM 2 G: 2 INJECTION, SOLUTION INTRAVENOUS at 20:01

## 2019-11-25 RX ADMIN — MIDAZOLAM 2 MG: 1 INJECTION INTRAMUSCULAR; INTRAVENOUS at 07:50

## 2019-11-25 RX ADMIN — BUPIVACAINE HYDROCHLORIDE 60 ML: 2.5 INJECTION, SOLUTION EPIDURAL; INFILTRATION; INTRACAUDAL; PERINEURAL at 07:57

## 2019-11-25 RX ADMIN — VECURONIUM BROMIDE 2 MG: 1 INJECTION, POWDER, LYOPHILIZED, FOR SOLUTION INTRAVENOUS at 11:49

## 2019-11-25 RX ADMIN — SCOPALAMINE 1 PATCH: 1 PATCH, EXTENDED RELEASE TRANSDERMAL at 06:38

## 2019-11-25 RX ADMIN — FENTANYL CITRATE 50 MCG: 0.05 INJECTION, SOLUTION INTRAMUSCULAR; INTRAVENOUS at 15:08

## 2019-11-25 NOTE — ANESTHESIA PREPROCEDURE EVALUATION
Anesthesia Evaluation     Patient summary reviewed and Nursing notes reviewed   NPO Solid Status: > 8 hours  NPO Liquid Status: > 8 hours           Airway   Mallampati: I  TM distance: >3 FB  Neck ROM: full  No difficulty expected  Dental      Pulmonary     breath sounds clear to auscultation  Cardiovascular     Rhythm: regular  Rate: normal        Neuro/Psych  (+) psychiatric history Depression,     GI/Hepatic/Renal/Endo      Musculoskeletal     Abdominal    Substance History      OB/GYN          Other                        Anesthesia Plan    ASA 2     general   (+/- TAP's, PECS dependent upon surgeon request)  intravenous induction     Anesthetic plan, all risks, benefits, and alternatives have been provided, discussed and informed consent has been obtained with: patient.    Plan discussed with CRNA.

## 2019-11-25 NOTE — ANESTHESIA POSTPROCEDURE EVALUATION
Patient: Honey Thomas    Procedure Summary     Date:  11/25/19 Room / Location:   [facility]    Anesthesia Start:  0750 Anesthesia Stop:      Procedures:       TOTAL ROBOTIC HYSTERECTOMY WITH BILATERAL SALPINGO-OOPHORECTOMY (N/A Abdomen)      BILATERAL BREAST AUGMENTATION WITH IMPLANTS (Bilateral Breast)      ABDOMINOPLASTY (N/A Abdomen) Diagnosis:      Surgeon:  Summer Dorsey DO; Alphonso Vasquez MD Provider:  Sameer Mccoy MD    Anesthesia Type:  general ASA Status:  2          Anesthesia Type: general  Last vitals  BP   122/73 (11/25/19 1424)   Temp   98.8 °F (37.1 °C) (11/25/19 1424)   Pulse   95 (11/25/19 1424)   Resp   16 (11/25/19 1424)     SpO2   96 % (11/25/19 1424)     Post Anesthesia Care and Evaluation    Patient location during evaluation: PACU  Patient participation: complete - patient cannot participate  Level of consciousness: responsive to physical stimuli  Pain score: 0  Pain management: adequate  Airway patency: patent  Anesthetic complications: No anesthetic complications    Cardiovascular status: stable  Respiratory status: acceptable, nasal cannula, oral airway and spontaneous ventilation  Hydration status: stable    Comments: Pt transferred to PACU with O2. Vital signs stable. Report to PACU RN and care accepted.

## 2019-11-25 NOTE — ANESTHESIA PROCEDURE NOTES
Airway  Urgency: elective    Date/Time: 11/25/2019 7:52 AM  End Time:11/25/2019 7:58 AM  Airway not difficult    General Information and Staff    Patient location during procedure: OR  CRNA: Gonsalo Landa CRNA    Indications and Patient Condition  Indications for airway management: airway protection    Preoxygenated: yes  MILS not maintained throughout  Mask difficulty assessment: 1 - vent by mask    Final Airway Details  Final airway type: endotracheal airway      Successful airway: ETT  Cuffed: yes   Successful intubation technique: direct laryngoscopy  Endotracheal tube insertion site: oral  Blade: Roseann  Blade size: 3  ETT size (mm): 7.0  Cormack-Lehane Classification: grade I - full view of glottis  Placement verified by: chest auscultation and capnometry   Measured from: lips  ETT/EBT  to lips (cm): 22  Number of attempts at approach: 1    Additional Comments  Negative epigastric sounds, Breath sound equal bilaterally with symmetric chest rise and fall

## 2019-11-25 NOTE — ANESTHESIA PROCEDURE NOTES
Peripheral Block    Pre-sedation assessment completed: 11/25/2019 7:52 AM    Patient reassessed immediately prior to procedure    Patient location during procedure: OR  Start time: 11/25/2019 7:52 AM  Stop time: 11/25/2019 7:57 AM  Reason for block: at surgeon's request and post-op pain management  Performed by  Anesthesiologist: Sameer Mccoy MD  Preanesthetic Checklist  Completed: patient identified, site marked, surgical consent, pre-op evaluation, timeout performed, IV checked, risks and benefits discussed and monitors and equipment checked  Prep:  Pt Position: supine  Sterile barriers:cap, gloves, sterile barriers and mask  Prep: ChloraPrep  Patient monitoring: blood pressure monitoring, continuous pulse oximetry and EKG  Procedure  Sedation:yes  Performed under: general  Guidance:ultrasound guided  Images:still images obtained, printed/placed on chart    Laterality:Bilateral  Block Type:TAP  Injection Technique:single-shot  Needle Type:short-bevel and echogenic  Needle Gauge:20 G  Resistance on Injection: none    Medications Used: buprenorphine (BUPRENEX) injection, 0.3 mg  dexamethasone sodium phosphate injection, 4 mg  bupivacaine PF (MARCAINE) 0.25 % injection, 60 mL  Med admintered at 11/25/2019 7:57 AM      Medications  Preservative Free Saline:10ml  Comment:Block Injection:  LA dose divided between Right and Left block        Post Assessment  Injection Assessment: negative aspiration for heme, incremental injection and no paresthesia on injection  Patient Tolerance:comfortable throughout block  Complications:no  Additional Notes      Under Ultrasound guidance, a BBraun 4inch 360 degree needle was advanced with Normal Saline hydro dissection of tissue.  The Internal Oblique and Transversus Abdominus muscles where visualized.  At or before the aponeurosis of Internal Oblique, local anesthetic spread was visualized in the Transversus Abdominus Plane. Injection was made incrementally with aspiration  every 5 mls.  There was no  intravascular injection,  injection pressure was normal, there was no neural injection, and the procedure was completed without difficulty.  Thank You.

## 2019-11-26 VITALS
SYSTOLIC BLOOD PRESSURE: 89 MMHG | HEART RATE: 94 BPM | WEIGHT: 162.4 LBS | OXYGEN SATURATION: 93 % | TEMPERATURE: 98.4 F | RESPIRATION RATE: 16 BRPM | DIASTOLIC BLOOD PRESSURE: 55 MMHG | HEIGHT: 66 IN | BODY MASS INDEX: 26.1 KG/M2

## 2019-11-26 PROBLEM — N92.0 MENORRHAGIA: Status: RESOLVED | Noted: 2019-11-25 | Resolved: 2019-11-26

## 2019-11-26 LAB
CYTO UR: NORMAL
LAB AP CASE REPORT: NORMAL
LAB AP CLINICAL INFORMATION: NORMAL
PATH REPORT.FINAL DX SPEC: NORMAL
PATH REPORT.GROSS SPEC: NORMAL

## 2019-11-26 PROCEDURE — 25010000002 ONDANSETRON PER 1 MG: Performed by: OBSTETRICS & GYNECOLOGY

## 2019-11-26 PROCEDURE — 25010000003 CEFAZOLIN IN DEXTROSE 2-4 GM/100ML-% SOLUTION: Performed by: PLASTIC SURGERY

## 2019-11-26 RX ORDER — IBUPROFEN 800 MG/1
800 TABLET ORAL EVERY 6 HOURS PRN
Qty: 30 TABLET | Refills: 1 | Status: SHIPPED | OUTPATIENT
Start: 2019-11-26 | End: 2019-12-26

## 2019-11-26 RX ORDER — HYDROCODONE BITARTRATE AND ACETAMINOPHEN 5; 325 MG/1; MG/1
1 TABLET ORAL EVERY 6 HOURS PRN
Qty: 10 TABLET | Refills: 0 | Status: SHIPPED | OUTPATIENT
Start: 2019-11-26 | End: 2019-12-06

## 2019-11-26 RX ORDER — BENZONATATE 100 MG/1
100 CAPSULE ORAL 3 TIMES DAILY PRN
Qty: 30 CAPSULE | Refills: 0 | Status: SHIPPED | OUTPATIENT
Start: 2019-11-26 | End: 2020-01-29

## 2019-11-26 RX ADMIN — CEFAZOLIN SODIUM 2 G: 2 INJECTION, SOLUTION INTRAVENOUS at 03:56

## 2019-11-26 RX ADMIN — HYDROCODONE BITARTRATE AND ACETAMINOPHEN 1 TABLET: 5; 325 TABLET ORAL at 03:51

## 2019-11-26 RX ADMIN — HYDROCODONE BITARTRATE AND ACETAMINOPHEN 1 TABLET: 5; 325 TABLET ORAL at 08:09

## 2019-11-26 RX ADMIN — ONDANSETRON 4 MG: 2 INJECTION INTRAMUSCULAR; INTRAVENOUS at 00:25

## 2020-01-27 ENCOUNTER — TELEPHONE (OUTPATIENT)
Dept: FAMILY MEDICINE CLINIC | Facility: CLINIC | Age: 35
End: 2020-01-27

## 2020-01-27 NOTE — TELEPHONE ENCOUNTER
Got renewal request for Sertraline from Pharmacy. I called the patient to see if she had any refills and she stated she wasn't sure but had some pills left. She is going to make an appt because she hasn't been seen since June 2019.

## 2020-01-29 ENCOUNTER — OFFICE VISIT (OUTPATIENT)
Dept: FAMILY MEDICINE CLINIC | Facility: CLINIC | Age: 35
End: 2020-01-29

## 2020-01-29 VITALS
RESPIRATION RATE: 20 BRPM | HEART RATE: 88 BPM | WEIGHT: 161 LBS | OXYGEN SATURATION: 99 % | BODY MASS INDEX: 25.88 KG/M2 | SYSTOLIC BLOOD PRESSURE: 114 MMHG | TEMPERATURE: 98.2 F | DIASTOLIC BLOOD PRESSURE: 76 MMHG | HEIGHT: 66 IN

## 2020-01-29 DIAGNOSIS — F41.9 ANXIETY: ICD-10-CM

## 2020-01-29 PROCEDURE — 99213 OFFICE O/P EST LOW 20 MIN: CPT | Performed by: NURSE PRACTITIONER

## 2020-01-29 RX ORDER — BUPROPION HYDROCHLORIDE 150 MG/1
TABLET, EXTENDED RELEASE ORAL
Qty: 60 TABLET | Refills: 1 | Status: SHIPPED | OUTPATIENT
Start: 2020-01-29 | End: 2020-05-05 | Stop reason: DRUGHIGH

## 2020-01-29 RX ORDER — SERTRALINE HYDROCHLORIDE 100 MG/1
TABLET, FILM COATED ORAL
Qty: 45 TABLET | Refills: 11 | Status: SHIPPED | OUTPATIENT
Start: 2020-01-29 | End: 2021-02-04

## 2020-01-29 RX ORDER — ESTRADIOL 1 MG/1
1 TABLET ORAL DAILY
COMMUNITY
Start: 2019-12-03 | End: 2021-03-23 | Stop reason: SDUPTHER

## 2020-01-29 NOTE — PROGRESS NOTES
Subjective   Honey Thomas is a 34 y.o. female.     History of Present Illness Patient is here to follow up on anxiety. Taking Zoloft. It helps. Still feels like she has a movie in her head and is easily distracted. No depression. Feels like she hyperfocuses on one thing.    Outpatient Encounter Medications as of 1/29/2020   Medication Sig Dispense Refill   • estradiol (ESTRACE) 1 MG tablet Take 1 tablet by mouth Daily.     • sertraline (ZOLOFT) 100 MG tablet Take 1 1/2 daily 45 tablet 11   • valACYclovir (VALTREX) 1000 MG tablet Take 2 tablets by mouth 2 (Two) Times a Day. 4 tablet 1   • [DISCONTINUED] sertraline (ZOLOFT) 100 MG tablet Take 1 1/2 daily 45 tablet 7   • buPROPion SR (WELLBUTRIN SR) 150 MG 12 hr tablet Take 1 daily for one week, then 1 bid 60 tablet 1   • [DISCONTINUED] benzonatate (TESSALON PERLES) 100 MG capsule Take 1 capsule by mouth 3 (Three) Times a Day As Needed for Cough. 30 capsule 0   • [DISCONTINUED] cephalexin (KEFLEX) 500 MG capsule Take 1 capsule by mouth 3 (Three) Times a Day for 5 day 15 capsule 0   • [DISCONTINUED] diazePAM (VALIUM) 5 MG tablet Take 1 tablet by mouth Every 8 (Eight) Hours As Needed for muscle spasms. 20 tablet 0     No facility-administered encounter medications on file as of 1/29/2020.        The following portions of the patient's history were reviewed and updated as appropriate: allergies, current medications, past family history, past medical history, past social history, past surgical history and problem list.    Review of Systems   Constitutional: Negative for appetite change, fever, unexpected weight gain and unexpected weight loss.   HENT: Negative for congestion, nosebleeds, sore throat and trouble swallowing.    Eyes: Negative for visual disturbance.   Respiratory: Negative for cough, shortness of breath and wheezing.    Cardiovascular: Negative for chest pain, palpitations and leg swelling.   Gastrointestinal: Negative for abdominal pain, blood in  "stool, constipation, diarrhea, nausea and vomiting.   Endocrine: Negative for polydipsia, polyphagia and polyuria.   Genitourinary: Negative for dysuria, frequency and hematuria.   Musculoskeletal: Negative for arthralgias, joint swelling and myalgias.   Skin: Negative for rash.   Neurological: Negative for dizziness, seizures, syncope and numbness.   Hematological: Negative for adenopathy. Does not bruise/bleed easily.   Psychiatric/Behavioral: Negative for behavioral problems, sleep disturbance and depressed mood. The patient is nervous/anxious.        Objective     Visit Vitals  /76   Pulse 88   Temp 98.2 °F (36.8 °C) (Temporal)   Resp 20   Ht 167.6 cm (66\")   Wt 73 kg (161 lb)   SpO2 99%   BMI 25.99 kg/m²       Physical Exam   Constitutional: She is oriented to person, place, and time. She appears well-developed and well-nourished. No distress.   HENT:   Head: Normocephalic and atraumatic.   Right Ear: External ear normal.   Left Ear: External ear normal.   Nose: Nose normal.   Eyes: Conjunctivae are normal. Right eye exhibits no discharge. Left eye exhibits no discharge. No scleral icterus.   Neck: Normal range of motion.   Cardiovascular: Normal rate.   Pulmonary/Chest: Effort normal. No respiratory distress.   Musculoskeletal: She exhibits no deformity.   Neurological: She is alert and oriented to person, place, and time. Coordination normal.   Skin: Skin is warm and dry.   Psychiatric: She has a normal mood and affect. Her behavior is normal. Judgment and thought content normal.   Vitals reviewed.        Assessment/Plan   Honey was seen today for anxiety.    Diagnoses and all orders for this visit:    Anxiety  -     buPROPion SR (WELLBUTRIN SR) 150 MG 12 hr tablet; Take 1 daily for one week, then 1 bid  -     sertraline (ZOLOFT) 100 MG tablet; Take 1 1/2 daily      Trial of wellbutrin with zoloft.  Discussed the nature of the disease including, risks, complications, implications, management, safe and " proper use of medications. Encouraged therapeutic lifestyle changes including low calorie diet with plenty of fruits and vegetables, daily exercise, medication compliance, and keeping scheduled follow up appointments with me and any other providers. Encouraged patient to have appointment for complete physical, fasting labs, appropriate screenings, and immunizations on an annual basis.

## 2020-05-05 DIAGNOSIS — F41.9 ANXIETY: Primary | ICD-10-CM

## 2020-05-05 RX ORDER — BUPROPION HYDROCHLORIDE 300 MG/1
300 TABLET ORAL DAILY
Qty: 90 TABLET | Refills: 1 | Status: SHIPPED | OUTPATIENT
Start: 2020-05-05 | End: 2021-02-04

## 2020-06-06 PROCEDURE — U0003 INFECTIOUS AGENT DETECTION BY NUCLEIC ACID (DNA OR RNA); SEVERE ACUTE RESPIRATORY SYNDROME CORONAVIRUS 2 (SARS-COV-2) (CORONAVIRUS DISEASE [COVID-19]), AMPLIFIED PROBE TECHNIQUE, MAKING USE OF HIGH THROUGHPUT TECHNOLOGIES AS DESCRIBED BY CMS-2020-01-R: HCPCS | Performed by: NURSE PRACTITIONER

## 2020-06-09 ENCOUNTER — TELEPHONE (OUTPATIENT)
Dept: URGENT CARE | Facility: CLINIC | Age: 35
End: 2020-06-09

## 2020-11-02 ENCOUNTER — OFFICE VISIT (OUTPATIENT)
Dept: FAMILY MEDICINE CLINIC | Facility: CLINIC | Age: 35
End: 2020-11-02

## 2020-11-02 VITALS
RESPIRATION RATE: 20 BRPM | HEART RATE: 86 BPM | DIASTOLIC BLOOD PRESSURE: 68 MMHG | BODY MASS INDEX: 30.37 KG/M2 | OXYGEN SATURATION: 99 % | TEMPERATURE: 97.7 F | SYSTOLIC BLOOD PRESSURE: 106 MMHG | WEIGHT: 189 LBS | HEIGHT: 66 IN

## 2020-11-02 DIAGNOSIS — H66.002 NON-RECURRENT ACUTE SUPPURATIVE OTITIS MEDIA OF LEFT EAR WITHOUT SPONTANEOUS RUPTURE OF TYMPANIC MEMBRANE: Primary | ICD-10-CM

## 2020-11-02 DIAGNOSIS — R09.81 NASAL CONGESTION: ICD-10-CM

## 2020-11-02 DIAGNOSIS — F41.9 ANXIETY: ICD-10-CM

## 2020-11-02 PROCEDURE — 99213 OFFICE O/P EST LOW 20 MIN: CPT | Performed by: NURSE PRACTITIONER

## 2020-11-02 RX ORDER — AMOXICILLIN AND CLAVULANATE POTASSIUM 875; 125 MG/1; MG/1
1 TABLET, FILM COATED ORAL 2 TIMES DAILY
Qty: 14 TABLET | Refills: 0 | Status: SHIPPED | OUTPATIENT
Start: 2020-11-02 | End: 2020-11-09

## 2020-11-02 RX ORDER — PSEUDOEPHEDRINE HCL 30 MG
30 TABLET ORAL EVERY 6 HOURS PRN
Qty: 40 TABLET | Refills: 0 | Status: SHIPPED | OUTPATIENT
Start: 2020-11-02 | End: 2021-02-04

## 2020-11-02 NOTE — PATIENT INSTRUCTIONS
Otitis Media, Adult    Otitis media occurs when there is inflammation and fluid in the middle ear. Your middle ear is a part of the ear that contains bones for hearing as well as air that helps send sounds to your brain.  What are the causes?  This condition is caused by a blockage in the eustachian tube. This tube drains fluid from the ear to the back of the nose (nasopharynx). A blockage in this tube can be caused by an object or by swelling (edema) in the tube. Problems that can cause a blockage include:  · A cold or other upper respiratory infection.  · Allergies.  · An irritant, such as tobacco smoke.  · Enlarged adenoids. The adenoids are areas of soft tissue located high in the back of the throat, behind the nose and the roof of the mouth.  · A mass in the nasopharynx.  · Damage to the ear caused by pressure changes (barotrauma).  What are the signs or symptoms?  Symptoms of this condition include:  · Ear pain.  · A fever.  · Decreased hearing.  · A headache.  · Tiredness (lethargy).  · Fluid leaking from the ear.  · Ringing in the ear.  How is this diagnosed?  This condition is diagnosed with a physical exam. During the exam your health care provider will use an instrument called an otoscope to look into your ear and check for redness, swelling, and fluid. He or she will also ask about your symptoms.  Your health care provider may also order tests, such as:  · A test to check the movement of the eardrum (pneumatic otoscopy). This test is done by squeezing a small amount of air into the ear.  · A test that changes air pressure in the middle ear to check how well the eardrum moves and whether the eustachian tube is working (tympanogram).  How is this treated?  This condition usually goes away on its own within 3-5 days. But if the condition is caused by a bacteria infection and does not go away own its own, or keeps coming back, your health care provider may:  · Prescribe antibiotic medicines to treat the  infection.  · Prescribe or recommend medicines to control pain.  Follow these instructions at home:  · Take over-the-counter and prescription medicines only as told by your health care provider.  · If you were prescribed an antibiotic medicine, take it as told by your health care provider. Do not stop taking the antibiotic even if you start to feel better.  · Keep all follow-up visits as told by your health care provider. This is important.  Contact a health care provider if:  · You have bleeding from your nose.  · There is a lump on your neck.  · You are not getting better in 5 days.  · You feel worse instead of better.  Get help right away if:  · You have severe pain that is not controlled with medicine.  · You have swelling, redness, or pain around your ear.  · You have stiffness in your neck.  · A part of your face is paralyzed.  · The bone behind your ear (mastoid) is tender when you touch it.  · You develop a severe headache.  Summary  · Otitis media is redness, soreness, and swelling of the middle ear.  · This condition usually goes away on its own within 3-5 days.  · If the problem does not go away in 3-5 days, your health care provider may prescribe or recommend medicines to treat your symptoms.  · If you were prescribed an antibiotic medicine, take it as told by your health care provider.  This information is not intended to replace advice given to you by your health care provider. Make sure you discuss any questions you have with your health care provider.  Document Released: 09/22/2005 Document Revised: 11/30/2018 Document Reviewed: 12/08/2017  [employer] Patient Education © 2020 [employer]    Managing Anxiety, Adult  After being diagnosed with an anxiety disorder, you may be relieved to know why you have felt or behaved a certain way. You may also feel overwhelmed about the treatment ahead and what it will mean for your life. With care and support, you can manage this condition and recover from  it.  How to manage lifestyle changes  Managing stress and anxiety    Stress is your body's reaction to life changes and events, both good and bad. Most stress will last just a few hours, but stress can be ongoing and can lead to more than just stress. Although stress can play a major role in anxiety, it is not the same as anxiety. Stress is usually caused by something external, such as a deadline, test, or competition. Stress normally passes after the triggering event has ended.   Anxiety is caused by something internal, such as imagining a terrible outcome or worrying that something will go wrong that will devastate you. Anxiety often does not go away even after the triggering event is over, and it can become long-term (chronic) worry. It is important to understand the differences between stress and anxiety and to manage your stress effectively so that it does not lead to an anxious response.  Talk with your health care provider or a counselor to learn more about reducing anxiety and stress. He or she may suggest tension reduction techniques, such as:  · Music therapy. This can include creating or listening to music that you enjoy and that inspires you.  · Mindfulness-based meditation. This involves being aware of your normal breaths while not trying to control your breathing. It can be done while sitting or walking.  · Centering prayer. This involves focusing on a word, phrase, or sacred image that means something to you and brings you peace.  · Deep breathing. To do this, expand your stomach and inhale slowly through your nose. Hold your breath for 3-5 seconds. Then exhale slowly, letting your stomach muscles relax.  · Self-talk. This involves identifying thought patterns that lead to anxiety reactions and changing those patterns.  · Muscle relaxation. This involves tensing muscles and then relaxing them.  Choose a tension reduction technique that suits your lifestyle and personality. These techniques take time  and practice. Set aside 5-15 minutes a day to do them. Therapists can offer counseling and training in these techniques. The training to help with anxiety may be covered by some insurance plans. Other things you can do to manage stress and anxiety include:  · Keeping a stress/anxiety diary. This can help you learn what triggers your reaction and then learn ways to manage your response.  · Thinking about how you react to certain situations. You may not be able to control everything, but you can control your response.  · Making time for activities that help you relax and not feeling guilty about spending your time in this way.  · Visual imagery and yoga can help you stay calm and relax.    Medicines  Medicines can help ease symptoms. Medicines for anxiety include:  · Anti-anxiety drugs.  · Antidepressants.  Medicines are often used as a primary treatment for anxiety disorder. Medicines will be prescribed by a health care provider. When used together, medicines, psychotherapy, and tension reduction techniques may be the most effective treatment.  Relationships  Relationships can play a big part in helping you recover. Try to spend more time connecting with trusted friends and family members. Consider going to couples counseling, taking family education classes, or going to family therapy. Therapy can help you and others better understand your condition.  How to recognize changes in your anxiety  Everyone responds differently to treatment for anxiety. Recovery from anxiety happens when symptoms decrease and stop interfering with your daily activities at home or work. This may mean that you will start to:  · Have better concentration and focus. Worry will interfere less in your daily thinking.  · Sleep better.  · Be less irritable.  · Have more energy.  · Have improved memory.  It is important to recognize when your condition is getting worse. Contact your health care provider if your symptoms interfere with home or work  and you feel like your condition is not improving.  Follow these instructions at home:  Activity  · Exercise. Most adults should do the following:  ? Exercise for at least 150 minutes each week. The exercise should increase your heart rate and make you sweat (moderate-intensity exercise).  ? Strengthening exercises at least twice a week.  · Get the right amount and quality of sleep. Most adults need 7-9 hours of sleep each night.  Lifestyle    · Eat a healthy diet that includes plenty of vegetables, fruits, whole grains, low-fat dairy products, and lean protein. Do not eat a lot of foods that are high in solid fats, added sugars, or salt.  · Make choices that simplify your life.  · Do not use any products that contain nicotine or tobacco, such as cigarettes, e-cigarettes, and chewing tobacco. If you need help quitting, ask your health care provider.  · Avoid caffeine, alcohol, and certain over-the-counter cold medicines. These may make you feel worse. Ask your pharmacist which medicines to avoid.  General instructions  · Take over-the-counter and prescription medicines only as told by your health care provider.  · Keep all follow-up visits as told by your health care provider. This is important.  Where to find support  You can get help and support from these sources:  · Self-help groups.  · Online and community organizations.  · A trusted spiritual leader.  · Couples counseling.  · Family education classes.  · Family therapy.  Where to find more information  You may find that joining a support group helps you deal with your anxiety. The following sources can help you locate counselors or support groups near you:  · Mental Health [place]: www.mentalhealth[place].net  · Anxiety and Depression Association of [place] (ADAA): www.adaa.org  · [employer] [place] [employer]ness (MAGDALENA): www.magdalena.org  Contact a health care provider if you:  · Have a hard time staying focused or finishing daily tasks.  · Spend many hours a  day feeling worried about everyday life.  · Become exhausted by worry.  · Start to have headaches, feel tense, or have nausea.  · Urinate more than normal.  · Have diarrhea.  Get help right away if you have:  · A racing heart and shortness of breath.  · Thoughts of hurting yourself or others.  If you ever feel like you may hurt yourself or others, or have thoughts about taking your own life, get help right away. You can go to your nearest emergency department or call:  · Your local emergency services (911 in the [place]  · A suicide crisis helpline, such as the National Suicide Prevention Lifeline at 1-246.333.7524. This is open 24 hours a day.  Summary  · Taking steps to learn and use tension reduction techniques can help calm you and help prevent triggering an anxiety reaction.  · When used together, medicines, psychotherapy, and tension reduction techniques may be the most effective treatment.  · Family, friends, and partners can play a big part in helping you recover from an anxiety disorder.  This information is not intended to replace advice given to you by your health care provider. Make sure you discuss any questions you have with your health care provider.  Document Released: 12/12/2017 Document Revised: 05/19/2020 Document Reviewed: 05/19/2020  [employer] Patient Education © 2020 [employer]    Nonallergic Rhinitis  Nonallergic rhinitis is a condition that causes symptoms that affect the nose, such as a runny nose and a stuffed-up nose (nasal congestion) that can make it hard to breathe through the nose. This condition is different from having an allergy (allergic rhinitis). Allergic rhinitis occurs when the body's defense system (immune system) reacts to a substance that you are allergic to (allergen), such as pollen, pet dander, mold, or dust. Nonallergic rhinitis has many similar symptoms, but it is not caused by allergens. Nonallergic rhinitis can be a short-term or long-term problem.  What are the  causes?  This condition can be caused by many different things. Some common types of nonallergic rhinitis include:  Infectious rhinitis  · This is usually due to an infection in the upper respiratory tract.  Vasomotor rhinitis  · This is the most common type of long-term nonallergic rhinitis.  · It is caused by too much blood flow through the nose, which makes the tissue inside of the nose swell.  · Symptoms are often triggered by strong odors, cold air, stress, drinking alcohol, cigarette smoke, or changes in the weather.  Occupational rhinitis  · This type is caused by triggers in the workplace, such as chemicals, dusts, animal dander, or air pollution.  Hormonal rhinitis  · This type occurs in women as a result of an increase in the female hormone estrogen.  · It may occur during pregnancy, puberty, and menstrual cycles.  · Symptoms improve when estrogen levels drop.  Drug-induced rhinitis  Several drugs can cause nonallergic rhinitis, including:  · Medicines that are used to treat high blood pressure, heart disease, and Parkinson disease.  · Aspirin and NSAIDs.  · Over-the-counter nasal decongestant sprays. These can cause a type of nonallergic rhinitis (rhinitis medicamentosa) when they are used for more than a few days.  Nonallergic rhinitis with eosinophilia syndrome (NARES)  · This type is caused by having too much of a certain type of white blood cell (eosinophil).  Nonallergic rhinitis can also be caused by a reaction to eating hot or spicy foods. This does not usually cause long-term symptoms. In some cases, the cause of nonallergic rhinitis is not known.  What increases the risk?  You are more likely to develop this condition if:  · You are 30-60 years of age.  · You are a woman. Women are twice as likely to have this condition.  What are the signs or symptoms?  Common symptoms of this condition include:  · Nasal congestion.  · Runny nose.  · The feeling of mucus going down the back of the throat  (postnasal drip).  · Trouble sleeping at night and daytime sleepiness.  Less common symptoms include:  · Sneezing.  · Coughing.  · Itchy nose.  · Bloodshot eyes.  How is this diagnosed?  This condition may be diagnosed based on:  · Your symptoms and medical history.  · A physical exam.  · Allergy testing to rule out allergic rhinitis. You may have skin tests or blood tests.  In some cases, the health care provider may take a swab of nasal secretions to look for an increased number of eosinophils. This would be done to confirm a diagnosis of NARES.  How is this treated?  Treatment for this condition depends on the cause. No single treatment works for everyone. Work with your health care provider to find the best treatment for you. Treatment may include:  · Avoiding the things that trigger your symptoms.  · Using medicines to relieve congestion, such as:  ? Steroid nasal spray. There are many types. You may need to try a few to find out which one works best.  ? Decongestant medicine. This may be an oral medicine or a nasal spray. These medicines are only used for a short time.  · Using medicines to relieve a runny nose. These may include antihistamine medicines or anticholinergic nasal sprays.    Surgery to remove tissue from inside the nose may be needed in severe cases if the condition has not improved after 6-12 months of medical treatment.  Follow these instructions at home:  · Take or use over-the-counter and prescription medicines only as told by your health care provider. Do not stop using your medicine even if you start to feel better.  · Use salt-water (saline) rinses or other solutions (nasal washes or irrigations) to wash or rinse out the inside of your nose as told by your health care provider.  · Do not take NSAIDs or medicines that contain aspirin if they make your symptoms worse.  · Do not drink alcohol if it makes your symptoms worse.  · Do not use any tobacco products, such as cigarettes, chewing  tobacco, and e-cigarettes. If you need help quitting, ask your health care provider.  · Avoid secondhand smoke.  · Get some exercise every day. Exercise may help reduce symptoms of nonallergic rhinitis for some people. Ask your health care provider how much exercise and what types of exercise are safe for you.  · Sleep with the head of your bed raised (elevated). This may reduce nighttime nasal congestion.  · Keep all follow-up visits as told by your health care provider. This is important.  Contact a health care provider if:  · You have a fever.  · Your symptoms are getting worse at home.  · Your symptoms are not responding to medicine.  · You develop new symptoms, especially a headache or nosebleed.  This information is not intended to replace advice given to you by your health care provider. Make sure you discuss any questions you have with your health care provider.  Document Released: 04/10/2017 Document Revised: 11/30/2018 Document Reviewed: 03/09/2017  [employer] Patient Education © 2020 [employer]

## 2020-11-02 NOTE — PROGRESS NOTES
Subjective   Honey Thomas is a 35 y.o. female.   Chief Complaint   Patient presents with   • Earache     started 3 days ago   • Fatigue     feeling tired       History of Present Illness   Patient is here for compliant of left ear pain. Started 3 days ago. No fever, having congestion. Post nasal drainage. Face fullness.   She has not taken any OTC medications.   Patient is having issues with concentration. She is easily distracted. 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       The following portions of the patient's history were reviewed and updated as appropriate: allergies, current medications, past family history, past medical history, past social history, past surgical history and problem list.    Review of Systems   Constitutional: Positive for fatigue. Negative for activity change, appetite change, chills and fever.   HENT: Positive for congestion, ear pain, sinus pressure and sneezing. Negative for sinus pain.         Left ear pain  Congestion     Eyes: Negative.    Respiratory: Negative.    Cardiovascular: Negative.    Gastrointestinal: Negative.    Genitourinary: Negative.    Musculoskeletal: Negative.    Skin: Negative.    Allergic/Immunologic: Positive for environmental allergies.   Neurological: Positive for headaches.   Hematological: Negative.    Psychiatric/Behavioral: Positive for decreased concentration and sleep disturbance. Negative for agitation, behavioral problems and confusion. The patient is nervous/anxious.      Past Surgical History:   Procedure Laterality Date   • ABDOMINOPLASTY N/A 11/25/2019    Procedure: ABDOMINOPLASTY;  Surgeon: Alphonso Vasquez MD;  Location:  [facility];  Service: Plastics   • BREAST AUGMENTATION Bilateral 11/25/2019    Procedure: BREAST AUGMENTATION WITH IMPLANTS BILATERAL;  Surgeon: Alphonso Vasquez MD;  Location: [facility] OR;  Service: Plastics   • FRACTURE SURGERY Left 1995    lEFT FOREARM   • OOPHORECTOMY     • OTHER  "SURGICAL HISTORY Left 1995    repaired compound fracture (left forearm)   • TOTAL LAPAROSCOPIC HYSTERECTOMY SALPINGO OOPHORECTOMY N/A 11/25/2019    Procedure: TOTAL ROBOTIC HYSTERECTOMY WITH BILATERAL SALPINGO-OOPHORECTOMY;  Surgeon: Summer Dorsey DO;  Location: [facility];  Service: [facility]   • WISDOM TOOTH EXTRACTION       Past Medical History:   Diagnosis Date   • Anxiety    • BPPV (benign paroxysmal positional vertigo)    • Carpal tunnel syndrome 2010    bilat   • Vitamin D deficiency        Objective   No Known Allergies  Visit Vitals  /68 (BP Location: Left arm, Patient Position: Sitting, Cuff Size: Adult)   Pulse 86   Temp 97.7 °F (36.5 °C)   Resp 20   Ht 167.6 cm (66\")   Wt 85.7 kg (189 lb)   LMP 11/01/2019   SpO2 99%   Breastfeeding No   BMI 30.51 kg/m²       Physical Exam  Vitals signs reviewed.   Constitutional:       Appearance: Normal appearance. She is well-developed and well-groomed.   HENT:      Head: Normocephalic.      Right Ear: Hearing, tympanic membrane, ear canal and external ear normal.      Left Ear: Tenderness present. There is no impacted cerumen. Tympanic membrane is erythematous and bulging.      Nose: Congestion present.      Right Sinus: Maxillary sinus tenderness and frontal sinus tenderness present.      Left Sinus: Maxillary sinus tenderness and frontal sinus tenderness present.      Comments: Tenderness to frontal and maxillary sinus.        Mouth/Throat:      Lips: Pink.      Mouth: Mucous membranes are moist.      Pharynx: Oropharynx is clear. No posterior oropharyngeal erythema.   Cardiovascular:      Rate and Rhythm: Normal rate and regular rhythm.   Pulmonary:      Effort: Pulmonary effort is normal.      Breath sounds: Normal breath sounds.   Skin:     General: Skin is warm.      Capillary Refill: Capillary refill takes less than 2 seconds.   Neurological:      Mental Status: She is alert.   Psychiatric:         Mood and Affect: Mood normal.         Behavior: Behavior " normal. Behavior is cooperative.         Cognition and Memory: Cognition normal.         Judgment: Judgment normal.         Assessment/Plan   Diagnoses and all orders for this visit:    1. Non-recurrent acute suppurative otitis media of left ear without spontaneous rupture of tympanic membrane (Primary)  -     amoxicillin-clavulanate (Augmentin) 875-125 MG per tablet; Take 1 tablet by mouth 2 (Two) Times a Day for 7 days.  Dispense: 14 tablet; Refill: 0    2. Nasal congestion  -     pseudoephedrine (Sudafed) 30 MG tablet; Take 1 tablet by mouth Every 6 (Six) Hours As Needed for Congestion.  Dispense: 40 tablet; Refill: 0    3. Anxiety  -     Ambulatory Referral to Psychiatry    Referral to psychiatry for issues with concentration.        follow up with PCP as needed.            Patient Instructions   Nonallergic Rhinitis  Nonallergic rhinitis is a condition that causes symptoms that affect the nose, such as a runny nose and a stuffed-up nose (nasal congestion) that can make it hard to breathe through the nose. This condition is different from having an allergy (allergic rhinitis). Allergic rhinitis occurs when the body's defense system (immune system) reacts to a substance that you are allergic to (allergen), such as pollen, pet dander, mold, or dust. Nonallergic rhinitis has many similar symptoms, but it is not caused by allergens. Nonallergic rhinitis can be a short-term or long-term problem.  What are the causes?  This condition can be caused by many different things. Some common types of nonallergic rhinitis include:  Infectious rhinitis  · This is usually due to an infection in the upper respiratory tract.  Vasomotor rhinitis  · This is the most common type of long-term nonallergic rhinitis.  · It is caused by too much blood flow through the nose, which makes the tissue inside of the nose swell.  · Symptoms are often triggered by strong odors, cold air, stress, drinking alcohol, cigarette smoke, or changes in  the weather.  Occupational rhinitis  · This type is caused by triggers in the workplace, such as chemicals, dusts, animal dander, or air pollution.  Hormonal rhinitis  · This type occurs in women as a result of an increase in the female hormone estrogen.  · It may occur during pregnancy, puberty, and menstrual cycles.  · Symptoms improve when estrogen levels drop.  Drug-induced rhinitis  Several drugs can cause nonallergic rhinitis, including:  · Medicines that are used to treat high blood pressure, heart disease, and Parkinson disease.  · Aspirin and NSAIDs.  · Over-the-counter nasal decongestant sprays. These can cause a type of nonallergic rhinitis (rhinitis medicamentosa) when they are used for more than a few days.  Nonallergic rhinitis with eosinophilia syndrome (NARES)  · This type is caused by having too much of a certain type of white blood cell (eosinophil).  Nonallergic rhinitis can also be caused by a reaction to eating hot or spicy foods. This does not usually cause long-term symptoms. In some cases, the cause of nonallergic rhinitis is not known.  What increases the risk?  You are more likely to develop this condition if:  · You are 30-60 years of age.  · You are a woman. Women are twice as likely to have this condition.  What are the signs or symptoms?  Common symptoms of this condition include:  · Nasal congestion.  · Runny nose.  · The feeling of mucus going down the back of the throat (postnasal drip).  · Trouble sleeping at night and daytime sleepiness.  Less common symptoms include:  · Sneezing.  · Coughing.  · Itchy nose.  · Bloodshot eyes.  How is this diagnosed?  This condition may be diagnosed based on:  · Your symptoms and medical history.  · A physical exam.  · Allergy testing to rule out allergic rhinitis. You may have skin tests or blood tests.  In some cases, the health care provider may take a swab of nasal secretions to look for an increased number of eosinophils. This would be done to  confirm a diagnosis of NARES.  How is this treated?  Treatment for this condition depends on the cause. No single treatment works for everyone. Work with your health care provider to find the best treatment for you. Treatment may include:  · Avoiding the things that trigger your symptoms.  · Using medicines to relieve congestion, such as:  ? Steroid nasal spray. There are many types. You may need to try a few to find out which one works best.  ? Decongestant medicine. This may be an oral medicine or a nasal spray. These medicines are only used for a short time.  · Using medicines to relieve a runny nose. These may include antihistamine medicines or anticholinergic nasal sprays.    Surgery to remove tissue from inside the nose may be needed in severe cases if the condition has not improved after 6-12 months of medical treatment.  Follow these instructions at home:  · Take or use over-the-counter and prescription medicines only as told by your health care provider. Do not stop using your medicine even if you start to feel better.  · Use salt-water (saline) rinses or other solutions (nasal washes or irrigations) to wash or rinse out the inside of your nose as told by your health care provider.  · Do not take NSAIDs or medicines that contain aspirin if they make your symptoms worse.  · Do not drink alcohol if it makes your symptoms worse.  · Do not use any tobacco products, such as cigarettes, chewing tobacco, and e-cigarettes. If you need help quitting, ask your health care provider.  · Avoid secondhand smoke.  · Get some exercise every day. Exercise may help reduce symptoms of nonallergic rhinitis for some people. Ask your health care provider how much exercise and what types of exercise are safe for you.  · Sleep with the head of your bed raised (elevated). This may reduce nighttime nasal congestion.  · Keep all follow-up visits as told by your health care provider. This is important.  Contact a health care provider  if:  · You have a fever.  · Your symptoms are getting worse at home.  · Your symptoms are not responding to medicine.  · You develop new symptoms, especially a headache or nosebleed.  This information is not intended to replace advice given to you by your health care provider. Make sure you discuss any questions you have with your health care provider.  Document Released: 04/10/2017 Document Revised: 11/30/2018 Document Reviewed: 03/09/2017  [employer] Patient Education © 2020 [employer]  Otitis Media, Adult    Otitis media occurs when there is inflammation and fluid in the middle ear. Your middle ear is a part of the ear that contains bones for hearing as well as air that helps send sounds to your brain.  What are the causes?  This condition is caused by a blockage in the eustachian tube. This tube drains fluid from the ear to the back of the nose (nasopharynx). A blockage in this tube can be caused by an object or by swelling (edema) in the tube. Problems that can cause a blockage include:  · A cold or other upper respiratory infection.  · Allergies.  · An irritant, such as tobacco smoke.  · Enlarged adenoids. The adenoids are areas of soft tissue located high in the back of the throat, behind the nose and the roof of the mouth.  · A mass in the nasopharynx.  · Damage to the ear caused by pressure changes (barotrauma).  What are the signs or symptoms?  Symptoms of this condition include:  · Ear pain.  · A fever.  · Decreased hearing.  · A headache.  · Tiredness (lethargy).  · Fluid leaking from the ear.  · Ringing in the ear.  How is this diagnosed?  This condition is diagnosed with a physical exam. During the exam your health care provider will use an instrument called an otoscope to look into your ear and check for redness, swelling, and fluid. He or she will also ask about your symptoms.  Your health care provider may also order tests, such as:  · A test to check the movement of the eardrum (pneumatic  otoscopy). This test is done by squeezing a small amount of air into the ear.  · A test that changes air pressure in the middle ear to check how well the eardrum moves and whether the eustachian tube is working (tympanogram).  How is this treated?  This condition usually goes away on its own within 3-5 days. But if the condition is caused by a bacteria infection and does not go away own its own, or keeps coming back, your health care provider may:  · Prescribe antibiotic medicines to treat the infection.  · Prescribe or recommend medicines to control pain.  Follow these instructions at home:  · Take over-the-counter and prescription medicines only as told by your health care provider.  · If you were prescribed an antibiotic medicine, take it as told by your health care provider. Do not stop taking the antibiotic even if you start to feel better.  · Keep all follow-up visits as told by your health care provider. This is important.  Contact a health care provider if:  · You have bleeding from your nose.  · There is a lump on your neck.  · You are not getting better in 5 days.  · You feel worse instead of better.  Get help right away if:  · You have severe pain that is not controlled with medicine.  · You have swelling, redness, or pain around your ear.  · You have stiffness in your neck.  · A part of your face is paralyzed.  · The bone behind your ear (mastoid) is tender when you touch it.  · You develop a severe headache.  Summary  · Otitis media is redness, soreness, and swelling of the middle ear.  · This condition usually goes away on its own within 3-5 days.  · If the problem does not go away in 3-5 days, your health care provider may prescribe or recommend medicines to treat your symptoms.  · If you were prescribed an antibiotic medicine, take it as told by your health care provider.  This information is not intended to replace advice given to you by your health care provider. Make sure you discuss any questions  you have with your health care provider.  Document Released: 09/22/2005 Document Revised: 11/30/2018 Document Reviewed: 12/08/2017  [employer] Patient Education © 2020 [employer]    Managing Anxiety, Adult  After being diagnosed with an anxiety disorder, you may be relieved to know why you have felt or behaved a certain way. You may also feel overwhelmed about the treatment ahead and what it will mean for your life. With care and support, you can manage this condition and recover from it.  How to manage lifestyle changes  Managing stress and anxiety    Stress is your body's reaction to life changes and events, both good and bad. Most stress will last just a few hours, but stress can be ongoing and can lead to more than just stress. Although stress can play a major role in anxiety, it is not the same as anxiety. Stress is usually caused by something external, such as a deadline, test, or competition. Stress normally passes after the triggering event has ended.   Anxiety is caused by something internal, such as imagining a terrible outcome or worrying that something will go wrong that will devastate you. Anxiety often does not go away even after the triggering event is over, and it can become long-term (chronic) worry. It is important to understand the differences between stress and anxiety and to manage your stress effectively so that it does not lead to an anxious response.  Talk with your health care provider or a counselor to learn more about reducing anxiety and stress. He or she may suggest tension reduction techniques, such as:  · Music therapy. This can include creating or listening to music that you enjoy and that inspires you.  · Mindfulness-based meditation. This involves being aware of your normal breaths while not trying to control your breathing. It can be done while sitting or walking.  · Centering prayer. This involves focusing on a word, phrase, or sacred image that means something to you and brings  you peace.  · Deep breathing. To do this, expand your stomach and inhale slowly through your nose. Hold your breath for 3-5 seconds. Then exhale slowly, letting your stomach muscles relax.  · Self-talk. This involves identifying thought patterns that lead to anxiety reactions and changing those patterns.  · Muscle relaxation. This involves tensing muscles and then relaxing them.  Choose a tension reduction technique that suits your lifestyle and personality. These techniques take time and practice. Set aside 5-15 minutes a day to do them. Therapists can offer counseling and training in these techniques. The training to help with anxiety may be covered by some insurance plans. Other things you can do to manage stress and anxiety include:  · Keeping a stress/anxiety diary. This can help you learn what triggers your reaction and then learn ways to manage your response.  · Thinking about how you react to certain situations. You may not be able to control everything, but you can control your response.  · Making time for activities that help you relax and not feeling guilty about spending your time in this way.  · Visual imagery and yoga can help you stay calm and relax.    Medicines  Medicines can help ease symptoms. Medicines for anxiety include:  · Anti-anxiety drugs.  · Antidepressants.  Medicines are often used as a primary treatment for anxiety disorder. Medicines will be prescribed by a health care provider. When used together, medicines, psychotherapy, and tension reduction techniques may be the most effective treatment.  Relationships  Relationships can play a big part in helping you recover. Try to spend more time connecting with trusted friends and family members. Consider going to couples counseling, taking family education classes, or going to family therapy. Therapy can help you and others better understand your condition.  How to recognize changes in your anxiety  Everyone responds differently to treatment  for anxiety. Recovery from anxiety happens when symptoms decrease and stop interfering with your daily activities at home or work. This may mean that you will start to:  · Have better concentration and focus. Worry will interfere less in your daily thinking.  · Sleep better.  · Be less irritable.  · Have more energy.  · Have improved memory.  It is important to recognize when your condition is getting worse. Contact your health care provider if your symptoms interfere with home or work and you feel like your condition is not improving.  Follow these instructions at home:  Activity  · Exercise. Most adults should do the following:  ? Exercise for at least 150 minutes each week. The exercise should increase your heart rate and make you sweat (moderate-intensity exercise).  ? Strengthening exercises at least twice a week.  · Get the right amount and quality of sleep. Most adults need 7-9 hours of sleep each night.  Lifestyle    · Eat a healthy diet that includes plenty of vegetables, fruits, whole grains, low-fat dairy products, and lean protein. Do not eat a lot of foods that are high in solid fats, added sugars, or salt.  · Make choices that simplify your life.  · Do not use any products that contain nicotine or tobacco, such as cigarettes, e-cigarettes, and chewing tobacco. If you need help quitting, ask your health care provider.  · Avoid caffeine, alcohol, and certain over-the-counter cold medicines. These may make you feel worse. Ask your pharmacist which medicines to avoid.  General instructions  · Take over-the-counter and prescription medicines only as told by your health care provider.  · Keep all follow-up visits as told by your health care provider. This is important.  Where to find support  You can get help and support from these sources:  · Self-help groups.  · Online and community organizations.  · A trusted spiritual leader.  · Couples counseling.  · Family education classes.  · Family therapy.  Where  to find more information  You may find that joining a support group helps you deal with your anxiety. The following sources can help you locate counselors or support groups near you:  · Mental Health [place]: www.mentalhealth[place].net  · Anxiety and Depression Association of [place] (ADAA): www.adaa.org  · National [place] [employer]ness (MAG[place]AL[place]): www.magdalena.org  Contact a health care provider if you:  · Have a hard time staying focused or finishing daily tasks.  · Spend many hours a day feeling worried about everyday life.  · Become exhausted by worry.  · Start to have headaches, feel tense, or have nausea.  · Urinate more than normal.  · Have diarrhea.  Get help right away if you have:  · A racing heart and shortness of breath.  · Thoughts of hurting yourself or others.  If you ever feel like you may hurt yourself or others, or have thoughts about taking your own life, get help right away. You can go to your nearest emergency department or call:  · Your local emergency services (911 in the [place]  · A suicide crisis helpline, such as the National Suicide Prevention Lifeline at 1-183.117.6276. This is open 24 hours a day.  Summary  · Taking steps to learn and use tension reduction techniques can help calm you and help prevent triggering an anxiety reaction.  · When used together, medicines, psychotherapy, and tension reduction techniques may be the most effective treatment.  · Family, friends, and partners can play a big part in helping you recover from an anxiety disorder.  This information is not intended to replace advice given to you by your health care provider. Make sure you discuss any questions you have with your health care provider.  Document Released: 12/12/2017 Document Revised: 05/19/2020 Document Reviewed: 05/19/2020  [employer] Patient Education © 2020 [employer]        Vinita Love, APRN

## 2021-02-04 ENCOUNTER — OFFICE VISIT (OUTPATIENT)
Dept: FAMILY MEDICINE CLINIC | Facility: CLINIC | Age: 36
End: 2021-02-04

## 2021-02-04 VITALS
HEART RATE: 68 BPM | BODY MASS INDEX: 30.7 KG/M2 | HEIGHT: 66 IN | WEIGHT: 191 LBS | DIASTOLIC BLOOD PRESSURE: 82 MMHG | TEMPERATURE: 97.3 F | SYSTOLIC BLOOD PRESSURE: 120 MMHG | RESPIRATION RATE: 18 BRPM | OXYGEN SATURATION: 97 %

## 2021-02-04 DIAGNOSIS — F41.9 ANXIETY: ICD-10-CM

## 2021-02-04 DIAGNOSIS — Z00.00 HEALTH CARE MAINTENANCE: Primary | ICD-10-CM

## 2021-02-04 DIAGNOSIS — R41.840 ATTENTION AND CONCENTRATION DEFICIT: ICD-10-CM

## 2021-02-04 PROBLEM — S03.00XA TMJ (DISLOCATION OF TEMPOROMANDIBULAR JOINT): Status: ACTIVE | Noted: 2021-02-04

## 2021-02-04 LAB
25(OH)D3 SERPL-MCNC: 29.7 NG/ML (ref 30–100)
ALBUMIN SERPL-MCNC: 4.5 G/DL (ref 3.5–5.2)
ALBUMIN/GLOB SERPL: 2.3 G/DL
ALP SERPL-CCNC: 86 U/L (ref 39–117)
ALT SERPL W P-5'-P-CCNC: 19 U/L (ref 1–33)
ANION GAP SERPL CALCULATED.3IONS-SCNC: 10.1 MMOL/L (ref 5–15)
AST SERPL-CCNC: 20 U/L (ref 1–32)
BASOPHILS # BLD AUTO: 0.09 10*3/MM3 (ref 0–0.2)
BASOPHILS NFR BLD AUTO: 1.3 % (ref 0–1.5)
BILIRUB SERPL-MCNC: 0.2 MG/DL (ref 0–1.2)
BUN SERPL-MCNC: 16 MG/DL (ref 6–20)
BUN/CREAT SERPL: 23.9 (ref 7–25)
CALCIUM SPEC-SCNC: 9.4 MG/DL (ref 8.6–10.5)
CHLORIDE SERPL-SCNC: 105 MMOL/L (ref 98–107)
CHOLEST SERPL-MCNC: 172 MG/DL (ref 0–200)
CO2 SERPL-SCNC: 25.9 MMOL/L (ref 22–29)
CREAT SERPL-MCNC: 0.67 MG/DL (ref 0.57–1)
DEPRECATED RDW RBC AUTO: 40.5 FL (ref 37–54)
EOSINOPHIL # BLD AUTO: 0.14 10*3/MM3 (ref 0–0.4)
EOSINOPHIL NFR BLD AUTO: 2.1 % (ref 0.3–6.2)
ERYTHROCYTE [DISTWIDTH] IN BLOOD BY AUTOMATED COUNT: 12.2 % (ref 12.3–15.4)
GFR SERPL CREATININE-BSD FRML MDRD: 99 ML/MIN/1.73
GLOBULIN UR ELPH-MCNC: 2 GM/DL
GLUCOSE SERPL-MCNC: 76 MG/DL (ref 65–99)
HBA1C MFR BLD: 5.4 % (ref 4.8–5.6)
HCT VFR BLD AUTO: 41.2 % (ref 34–46.6)
HDLC SERPL-MCNC: 56 MG/DL (ref 40–60)
HGB BLD-MCNC: 14 G/DL (ref 12–15.9)
IMM GRANULOCYTES # BLD AUTO: 0.02 10*3/MM3 (ref 0–0.05)
IMM GRANULOCYTES NFR BLD AUTO: 0.3 % (ref 0–0.5)
LDLC SERPL CALC-MCNC: 103 MG/DL (ref 0–100)
LDLC/HDLC SERPL: 1.84 {RATIO}
LYMPHOCYTES # BLD AUTO: 1.56 10*3/MM3 (ref 0.7–3.1)
LYMPHOCYTES NFR BLD AUTO: 23.4 % (ref 19.6–45.3)
MCH RBC QN AUTO: 30.9 PG (ref 26.6–33)
MCHC RBC AUTO-ENTMCNC: 34 G/DL (ref 31.5–35.7)
MCV RBC AUTO: 90.9 FL (ref 79–97)
MONOCYTES # BLD AUTO: 0.61 10*3/MM3 (ref 0.1–0.9)
MONOCYTES NFR BLD AUTO: 9.1 % (ref 5–12)
NEUTROPHILS NFR BLD AUTO: 4.26 10*3/MM3 (ref 1.7–7)
NEUTROPHILS NFR BLD AUTO: 63.8 % (ref 42.7–76)
NRBC BLD AUTO-RTO: 0 /100 WBC (ref 0–0.2)
PLATELET # BLD AUTO: 252 10*3/MM3 (ref 140–450)
PMV BLD AUTO: 10.9 FL (ref 6–12)
POTASSIUM SERPL-SCNC: 4.4 MMOL/L (ref 3.5–5.2)
PROT SERPL-MCNC: 6.5 G/DL (ref 6–8.5)
RBC # BLD AUTO: 4.53 10*6/MM3 (ref 3.77–5.28)
SODIUM SERPL-SCNC: 141 MMOL/L (ref 136–145)
T4 FREE SERPL-MCNC: 1.29 NG/DL (ref 0.93–1.7)
TRIGL SERPL-MCNC: 66 MG/DL (ref 0–150)
TSH SERPL DL<=0.05 MIU/L-ACNC: 0.6 UIU/ML (ref 0.27–4.2)
VIT B12 BLD-MCNC: 480 PG/ML (ref 211–946)
VLDLC SERPL-MCNC: 13 MG/DL (ref 5–40)
WBC # BLD AUTO: 6.68 10*3/MM3 (ref 3.4–10.8)

## 2021-02-04 PROCEDURE — 82607 VITAMIN B-12: CPT | Performed by: NURSE PRACTITIONER

## 2021-02-04 PROCEDURE — 83036 HEMOGLOBIN GLYCOSYLATED A1C: CPT | Performed by: NURSE PRACTITIONER

## 2021-02-04 PROCEDURE — 99214 OFFICE O/P EST MOD 30 MIN: CPT | Performed by: NURSE PRACTITIONER

## 2021-02-04 PROCEDURE — 85025 COMPLETE CBC W/AUTO DIFF WBC: CPT | Performed by: NURSE PRACTITIONER

## 2021-02-04 PROCEDURE — 80061 LIPID PANEL: CPT | Performed by: NURSE PRACTITIONER

## 2021-02-04 PROCEDURE — 84439 ASSAY OF FREE THYROXINE: CPT | Performed by: NURSE PRACTITIONER

## 2021-02-04 PROCEDURE — 80053 COMPREHEN METABOLIC PANEL: CPT | Performed by: NURSE PRACTITIONER

## 2021-02-04 PROCEDURE — 82306 VITAMIN D 25 HYDROXY: CPT | Performed by: NURSE PRACTITIONER

## 2021-02-04 PROCEDURE — 84443 ASSAY THYROID STIM HORMONE: CPT | Performed by: NURSE PRACTITIONER

## 2021-02-04 RX ORDER — BUSPIRONE HYDROCHLORIDE 5 MG/1
TABLET ORAL
Qty: 90 TABLET | Refills: 2 | Status: SHIPPED | OUTPATIENT
Start: 2021-02-04 | End: 2021-03-16

## 2021-02-04 NOTE — PROGRESS NOTES
Subjective   Honey Thomas is a 35 y.o. female and is here for a comprehensive physical exam. The patient reports problems - increased anxiety and panic attacks on Sertraline 100. Hyperactive. No issues with attention.. Patient reports last physical date of 2019    Patient rates their health as good. Describes diet as Well Balanced Diet Other: carb splurge. Exercises regularly 3 times weekly. 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 Lives with spouse and children. Dental exam every 6 months-yes. Brushes teeth twice a day-yes. Vision exam in last 12 months-no.    Do you take any herbs or supplements that were not prescribed by a doctor? no  Are you taking aspirin daily? no  Family history of ovarian cancer? yes  Family history of breast cancer? no  FH of endometrial cancer? no  FH of cervical cancer? no  FH of colon cancer? no    Health Screening  Mammogram up-to-date?  no  Pap GLENN  Colonoscopy up-to-date? na  DEXA? no  Lung Cancer no      History:  LMP: Patient's last menstrual period was 11/01/2019.  Menopause at surgical years  Abnormal pap? no      Immunization History  Tdap? yes  HPV? not applicable  Shingles? not applicable  Hep A? yes  [id number]?na  [id number]?na    The following portions of the patient's history were reviewed and updated as appropriate: allergies, current medications, past family history, past medical history, past social history, past surgical history and problem list.    Past Medical History:   Diagnosis Date   • Anxiety    • BPPV (benign paroxysmal positional vertigo)    • Carpal tunnel syndrome 2010    bilat   • TMJ (dislocation of temporomandibular joint) 2/4/2021   • Vitamin D deficiency        Family History   Problem Relation Age of Onset   • Hypertension Father    • Diabetes Father    • Hypertension Mother    • Ovarian cancer Mother 37   • Graves' disease Sister    • Scoliosis Sister    • Sleep apnea Sister    • Lung cancer Maternal Grandmother    • Alzheimer's disease Paternal Grandmother    • Other  Paternal Grandfather         accident       Past Surgical History:   Procedure Laterality Date   • ABDOMINOPLASTY N/A 11/25/2019    Procedure: ABDOMINOPLASTY;  Surgeon: Alphonso Vasquez MD;  Location:  [facility];  Service: Plastics   • BREAST AUGMENTATION Bilateral 11/25/2019    Procedure: BREAST AUGMENTATION WITH IMPLANTS BILATERAL;  Surgeon: Alphonso aVsquez MD;  Location:  [facility];  Service: Plastics   • FRACTURE SURGERY Left 1995    lEFT FOREARM   • TOTAL LAPAROSCOPIC HYSTERECTOMY SALPINGO OOPHORECTOMY N/A 11/25/2019    Procedure: TOTAL ROBOTIC HYSTERECTOMY WITH BILATERAL SALPINGO-OOPHORECTOMY;  Surgeon: Summer Dorsey DO;  Location:  [facility];  Service: DaVinci   • WISDOM TOOTH EXTRACTION         Social History     Socioeconomic History   • Marital status:      Spouse name: Not on file   • Number of children: Not on file   • Years of education: Not on file   • Highest education level: Not on file   Occupational History   • Occupation: occupational therapy   Tobacco Use   • Smoking status: Never Smoker   • Smokeless tobacco: Never Used   Substance and Sexual Activity   • Alcohol use: Yes     Comment: social   • Drug use: No   • Sexual activity: Yes     Partners: Male     Birth control/protection: Surgical         Current Outpatient Medications:   •  estradiol (ESTRACE) 1 MG tablet, Take 1 tablet by mouth Daily., Disp: , Rfl:   •  busPIRone (BUSPAR) 5 MG tablet, 1 daily for 1 week, then 1 bid x1 week, then 1 tid, Disp: 90 tablet, Rfl: 2    Review of Systems  Do you have pain that bothers you in your daily life? no  Review of Systems   Constitutional: Negative for fatigue, fever and unexpected weight change.   HENT: Negative for congestion, hearing loss, nosebleeds, rhinorrhea, sore throat, trouble swallowing and voice change.    Eyes: Negative for pain, discharge, redness and visual disturbance.   Respiratory: Negative for cough, chest tightness, shortness of breath and wheezing.     Cardiovascular: Negative for chest pain, palpitations and leg swelling.   Gastrointestinal: Negative for abdominal distention, abdominal pain, anal bleeding, blood in stool, constipation, diarrhea, nausea and vomiting.   Endocrine: Negative for cold intolerance, heat intolerance, polydipsia, polyphagia and polyuria.   Genitourinary: Negative for dysuria, flank pain, frequency and hematuria.   Musculoskeletal: Positive for arthralgias. Negative for gait problem, joint swelling and myalgias.        Right knee, pain and stiff, bilat hips   Skin: Negative for color change and rash.   Neurological: Negative for dizziness, tremors, seizures, syncope, speech difficulty, weakness, numbness and headaches.   Hematological: Negative.    Psychiatric/Behavioral: The patient is nervous/anxious.        PHQ-2 Depression Screening  Little interest or pleasure in doing things? 0   Feeling down, depressed, or hopeless? 0   PHQ-2 Total Score 0        Objective   Physical Exam  Vitals signs and nursing note reviewed.   Constitutional:       General: She is not in acute distress.     Appearance: She is well-developed.   HENT:      Head: Normocephalic and atraumatic.      Right Ear: Tympanic membrane and external ear normal.      Left Ear: Tympanic membrane and external ear normal.      Nose: Nose normal.      Mouth/Throat:      Pharynx: No oropharyngeal exudate.   Eyes:      General: No scleral icterus.        Right eye: No discharge.         Left eye: No discharge.      Conjunctiva/sclera: Conjunctivae normal.      Pupils: Pupils are equal, round, and reactive to light.   Neck:      Musculoskeletal: Neck supple.      Thyroid: No thyromegaly.      Trachea: No tracheal deviation.   Cardiovascular:      Rate and Rhythm: Normal rate and regular rhythm.      Heart sounds: Normal heart sounds. No murmur. No friction rub. No gallop.    Pulmonary:      Effort: Pulmonary effort is normal. No respiratory distress.      Breath sounds: Normal  "breath sounds. No wheezing.   Abdominal:      General: Bowel sounds are normal. There is no distension.      Palpations: Abdomen is soft. There is no mass.      Tenderness: There is no abdominal tenderness.   Musculoskeletal:         General: No deformity.   Lymphadenopathy:      Cervical: No cervical adenopathy.   Skin:     General: Skin is warm and dry.      Capillary Refill: Capillary refill takes less than 2 seconds.      Findings: No erythema or rash.   Neurological:      Mental Status: She is alert and oriented to person, place, and time.      Coordination: Coordination normal.   Psychiatric:         Mood and Affect: Mood normal.         Speech: Speech normal.         Behavior: Behavior normal.         Thought Content: Thought content normal.         Judgment: Judgment normal.       /82   Pulse 68   Temp 97.3 °F (36.3 °C)   Resp 18   Ht 167.6 cm (66\")   Wt 86.6 kg (191 lb)   LMP 11/01/2019   SpO2 97%   BMI 30.83 kg/m²   Body mass index is 30.83 kg/m².       Assessment/Plan   Healthy female exam.     1.   Problem List Items Addressed This Visit        Mental Health    Anxiety    Relevant Medications    busPIRone (BUSPAR) 5 MG tablet      Other Visit Diagnoses     Health care maintenance    -  Primary    Relevant Orders    CBC & Differential    Comprehensive Metabolic Panel    Hemoglobin A1c    Lipid Panel    T4, Free    TSH    Vitamin D 25 Hydroxy    Vitamin B12    CBC Auto Differential    Attention and concentration deficit        Relevant Orders    Ambulatory Referral to Behavioral Health          1. Refer to psych for eval. Wean Zoloft and then start Buspar. Telephone visit in 3 weeks to follow up.  2. Patient Counseling:  --Nutrition: Stressed importance of moderation in sodium/caffeine intake, saturated fat and cholesterol, caloric balance, sufficient intake of fresh fruits, vegetables, fiber, calcium, iron, and 1 mg of folate supplement per day (for females capable of " pregnancy).  --Exercise: Stressed the importance of exercise 5 times a week  --Substance Abuse: Discussed cessation/primary prevention of tobacco, alcohol, or other drug use; driving or other dangerous activities under the influence; availability of treatment for abuse.    ---Dental health: Discussed importance of regular tooth brushing, flossing, and dental visits.  --Immunizations reviewed.  --Discussed benefits of screening colonoscopy.  --Discussed benefits of screening mammogram.  --After hours service discussed with patient, and appropriate use of the ER.  --Discussed the importance of medication compliance, follow up with me and other health care providers, annual physical, fasting labs, and age appropriate screenings.    3. Discussed the patient's BMI with her.  The BMI is above average; BMI management plan is completed  4. Follow up in one year  5. Discussed the nature of the disease including, risks, complications, implications, management, safe and proper use of medications. Encouraged therapeutic lifestyle changes including low calorie diet with plenty of fruits and vegetables, daily exercise, medication compliance, and keeping scheduled follow up appointments with me and any other providers. Encouraged patient to have appointment for complete physical, fasting labs, appropriate screenings, and immunizations on an annual basis.

## 2021-02-17 ENCOUNTER — TELEMEDICINE (OUTPATIENT)
Dept: PSYCHIATRY | Facility: CLINIC | Age: 36
End: 2021-02-17

## 2021-02-17 DIAGNOSIS — G47.9 SLEEP DIFFICULTIES: Chronic | ICD-10-CM

## 2021-02-17 DIAGNOSIS — F41.9 ANXIETY DISORDER, UNSPECIFIED TYPE: Chronic | ICD-10-CM

## 2021-02-17 DIAGNOSIS — F90.2 ATTENTION DEFICIT HYPERACTIVITY DISORDER, COMBINED TYPE: Primary | Chronic | ICD-10-CM

## 2021-02-17 PROCEDURE — 90792 PSYCH DIAG EVAL W/MED SRVCS: CPT | Performed by: NURSE PRACTITIONER

## 2021-02-17 RX ORDER — ATOMOXETINE 40 MG/1
40 CAPSULE ORAL DAILY
Qty: 30 CAPSULE | Refills: 0 | Status: SHIPPED | OUTPATIENT
Start: 2021-02-17 | End: 2021-03-19

## 2021-02-17 NOTE — TREATMENT PLAN
Multi-Disciplinary Problems (from Behavioral Health Treatment Plan)    Active Problems     Problem: Anxiety  Start Date: 02/17/21    Problem Details: The patient self-scales this problem as a 5 with 10 being the worst.        Goal Priority Start Date Expected End Date End Date    Patient will develop and implement behavioral and cognitive strategies to reduce anxiety and irrational fears. -- 02/17/21 -- --    Goal Details: Progress toward goal:  Not appropriate to rate progress toward goal since this is the initial treatment plan.        Goal Intervention Frequency Start Date End Date    Help patient explore past emotional issues in relation to present anxiety. Q Month 02/17/21 --    Intervention Details: Duration of treatment until until remission of symptoms.        Goal Intervention Frequency Start Date End Date    Help patient develop an awareness of their cognitive and physical responses to anxiety. Q Month 02/17/21 --    Intervention Details: Duration of treatment until until remission of symptoms.              Problem: ADHD (Adult)  Start Date: 02/17/21    Problem Details: The patient self-scales this problem as a 9 with 10 being the worst.      Goal Priority Start Date Expected End Date End Date    Patient will sustain attention and concentration to complete chores, and work responsibilites and increase positive interaction in all relationships. -- 02/17/21 -- --    Goal Details: Progress toward goal:  Not appropriate to rate progress toward goal since this is the initial treatment plan.      Goal Intervention Frequency Start Date End Date    Assist patient in setting responsible goals and breaking down large tasks. Q Month 02/17/21 --    Intervention Details: Duration of treatment until until remission of symptoms.      Goal Intervention Frequency Start Date End Date    Assist patient in using self monitoring checklist to improve attention, work performance, and social skills. Q Month 02/17/21 --     Intervention Details: Duration of treatment until until remission of symptoms.                         I have discussed and reviewed this treatment plan with the patient and/or guardian.  The patient has verbally agreed with this treatment plan (no signatures are obtained at today's visit as the patient is a telehealth patient and is unable to print and sign this document, therefore verbal agreement is obtained).

## 2021-03-10 ENCOUNTER — OFFICE VISIT (OUTPATIENT)
Dept: FAMILY MEDICINE CLINIC | Facility: CLINIC | Age: 36
End: 2021-03-10

## 2021-03-10 VITALS
TEMPERATURE: 97.7 F | WEIGHT: 184.6 LBS | SYSTOLIC BLOOD PRESSURE: 118 MMHG | BODY MASS INDEX: 29.67 KG/M2 | HEART RATE: 87 BPM | RESPIRATION RATE: 18 BRPM | OXYGEN SATURATION: 98 % | DIASTOLIC BLOOD PRESSURE: 84 MMHG | HEIGHT: 66 IN

## 2021-03-10 DIAGNOSIS — M79.601 CHRONIC PAIN OF RIGHT UPPER EXTREMITY: ICD-10-CM

## 2021-03-10 DIAGNOSIS — G89.29 CHRONIC RIGHT SHOULDER PAIN: ICD-10-CM

## 2021-03-10 DIAGNOSIS — G89.29 CHRONIC PAIN OF RIGHT UPPER EXTREMITY: ICD-10-CM

## 2021-03-10 DIAGNOSIS — M25.511 CHRONIC RIGHT SHOULDER PAIN: ICD-10-CM

## 2021-03-10 DIAGNOSIS — R20.2 NUMBNESS AND TINGLING OF RIGHT UPPER EXTREMITY: ICD-10-CM

## 2021-03-10 DIAGNOSIS — R20.0 NUMBNESS AND TINGLING OF RIGHT UPPER EXTREMITY: ICD-10-CM

## 2021-03-10 DIAGNOSIS — M54.2 NECK PAIN: Primary | ICD-10-CM

## 2021-03-10 PROCEDURE — 99213 OFFICE O/P EST LOW 20 MIN: CPT | Performed by: NURSE PRACTITIONER

## 2021-03-10 RX ORDER — METHYLPREDNISOLONE 4 MG/1
TABLET ORAL
Qty: 21 TABLET | Refills: 0 | Status: SHIPPED | OUTPATIENT
Start: 2021-03-10 | End: 2021-04-08

## 2021-03-10 NOTE — PROGRESS NOTES
"Chief Complaint  Neck Pain (neck pain (for years) but increased pain x2-3 months with radiation into right shoulder/arm)    Subjective          Honey Thomas presents to [facility]  History of Present Illness   Complains of years of neck pain for years, however much worse in last 6 weeks. Now with pain in right scapular area radiating to neck and right upper ext. Has numbness and weakness in RUE with shoulder flexion. Now with neck stiffness and decreased ROM. Treated with Ibuprofen 800mg daily for every other day for 3 weeks. Has known CTS. Also notes some numbness of lips. Under increased stress. Father-in-law passed in Dec of covid. Moved to allow mother in law to move in with her. They are building a new house. Has taken intermittent nsaids and tylenol without relief.    Objective   Vital Signs:   /84   Pulse 87   Temp 97.7 °F (36.5 °C) (Temporal)   Resp 18   Ht 167.6 cm (66\")   Wt 83.7 kg (184 lb 9.6 oz)   SpO2 98%   BMI 29.80 kg/m²     Physical Exam  Vitals and nursing note reviewed.   Constitutional:       General: She is not in acute distress.     Appearance: She is well-developed.   HENT:      Head: Normocephalic and atraumatic.      Right Ear: Tympanic membrane and external ear normal.      Left Ear: Tympanic membrane and external ear normal.      Nose: Nose normal.      Mouth/Throat:      Pharynx: No oropharyngeal exudate.   Eyes:      General: No scleral icterus.        Right eye: No discharge.         Left eye: No discharge.      Conjunctiva/sclera: Conjunctivae normal.      Pupils: Pupils are equal, round, and reactive to light.   Neck:      Thyroid: No thyromegaly.      Trachea: No tracheal deviation.   Cardiovascular:      Rate and Rhythm: Normal rate and regular rhythm.      Heart sounds: Normal heart sounds. No murmur. No friction rub. No gallop.    Pulmonary:      Effort: Pulmonary effort is normal. No respiratory distress.      Breath sounds: " Normal breath sounds. No wheezing.   Abdominal:      General: Bowel sounds are normal. There is no distension.      Palpations: Abdomen is soft. There is no mass.      Tenderness: There is no abdominal tenderness.   Musculoskeletal:         General: No deformity.      Cervical back: Neck supple.      Comments: Decreased rom of neck. FROM of bilat shoulders. No obvious spasm   Lymphadenopathy:      Cervical: No cervical adenopathy.   Skin:     General: Skin is warm and dry.      Capillary Refill: Capillary refill takes less than 2 seconds.      Findings: No erythema or rash.   Neurological:      Mental Status: She is alert and oriented to person, place, and time.      Coordination: Coordination normal.   Psychiatric:         Speech: Speech normal.         Behavior: Behavior normal.         Thought Content: Thought content normal.         Judgment: Judgment normal.        Result Review :                      Assessment and Plan    Diagnoses and all orders for this visit:    1. Neck pain (Primary)  -     MRI Cervical Spine Without Contrast; Future  -     methylPREDNISolone (MEDROL) 4 MG dose pack; Take as directed on package instructions.  Dispense: 21 tablet; Refill: 0  -     Cyclobenzaprine (Flexeril); Future    2. Chronic pain of right upper extremity  -     MRI Cervical Spine Without Contrast; Future  -     methylPREDNISolone (MEDROL) 4 MG dose pack; Take as directed on package instructions.  Dispense: 21 tablet; Refill: 0  -     Cyclobenzaprine (Flexeril); Future    3. Numbness and tingling of right upper extremity  -     MRI Cervical Spine Without Contrast; Future  -     methylPREDNISolone (MEDROL) 4 MG dose pack; Take as directed on package instructions.  Dispense: 21 tablet; Refill: 0  -     Cyclobenzaprine (Flexeril); Future    4. Chronic right shoulder pain  -     XR Shoulder 2+ View Right; Future  -     methylPREDNISolone (MEDROL) 4 MG dose pack; Take as directed on package instructions.  Dispense: 21  tablet; Refill: 0  -     Cyclobenzaprine (Flexeril); Future    Consider referral to pt or neurosurg after imaging.  Discussed the nature of the disease including, risks, complications, implications, management, safe and proper use of medications. Encouraged therapeutic lifestyle changes including low calorie diet with plenty of fruits and vegetables, daily exercise, medication compliance, and keeping scheduled follow up appointments with me and any other providers. Encouraged patient to have appointment for complete physical, fasting labs, appropriate screenings, and immunizations on an annual basis.  Follow up symptoms persist or worsen or go to ER.      Follow Up   No follow-ups on file.  Patient was given instructions and counseling regarding her condition or for health maintenance advice. Please see specific information pulled into the AVS if appropriate.

## 2021-03-11 DIAGNOSIS — M54.2 NECK PAIN: Primary | ICD-10-CM

## 2021-03-11 RX ORDER — CYCLOBENZAPRINE HCL 10 MG
10 TABLET ORAL 3 TIMES DAILY PRN
Qty: 30 TABLET | Refills: 1 | Status: SHIPPED | OUTPATIENT
Start: 2021-03-11

## 2021-03-16 ENCOUNTER — APPOINTMENT (OUTPATIENT)
Dept: LAB | Facility: HOSPITAL | Age: 36
End: 2021-03-16

## 2021-03-16 ENCOUNTER — TELEMEDICINE (OUTPATIENT)
Dept: PSYCHIATRY | Facility: CLINIC | Age: 36
End: 2021-03-16

## 2021-03-16 ENCOUNTER — LAB (OUTPATIENT)
Dept: LAB | Facility: HOSPITAL | Age: 36
End: 2021-03-16

## 2021-03-16 DIAGNOSIS — G47.9 SLEEP DIFFICULTIES: Chronic | ICD-10-CM

## 2021-03-16 DIAGNOSIS — F41.9 ANXIETY DISORDER, UNSPECIFIED TYPE: Chronic | ICD-10-CM

## 2021-03-16 DIAGNOSIS — F90.2 ATTENTION DEFICIT HYPERACTIVITY DISORDER, COMBINED TYPE: Primary | Chronic | ICD-10-CM

## 2021-03-16 LAB
AMPHET+METHAMPHET UR QL: NEGATIVE
AMPHETAMINES UR QL: NEGATIVE
BARBITURATES UR QL SCN: NEGATIVE
BENZODIAZ UR QL SCN: NEGATIVE
BUPRENORPHINE SERPL-MCNC: NEGATIVE NG/ML
CANNABINOIDS SERPL QL: NEGATIVE
COCAINE UR QL: NEGATIVE
METHADONE UR QL SCN: NEGATIVE
OPIATES UR QL: NEGATIVE
OXYCODONE UR QL SCN: NEGATIVE
PCP UR QL SCN: NEGATIVE
PROPOXYPH UR QL: NEGATIVE
TRICYCLICS UR QL SCN: NEGATIVE

## 2021-03-16 PROCEDURE — 80306 DRUG TEST PRSMV INSTRMNT: CPT | Performed by: NURSE PRACTITIONER

## 2021-03-16 PROCEDURE — 99214 OFFICE O/P EST MOD 30 MIN: CPT | Performed by: NURSE PRACTITIONER

## 2021-03-16 NOTE — PROGRESS NOTES
This provider is located at the [facility] (formerly [facility]3] (through [facility]), [address], [place], [address] using a secure [employer] Video Visit through [employer]. Patient is being seen remotely via telehealth at their home address in [place], and stated they are in a secure environment for this session. The patient's condition being diagnosed/treated is appropriate for telemedicine. The provider identified herself as well as her credentials.   The patient, and/or patients guardian, consent to be seen remotely, and when consent is given they understand that the consent allows for patient identifiable information to be sent to a third party as needed.   They may refuse to be seen remotely at any time. The electronic data is encrypted and password protected, and the patient and/or guardian has been advised of the potential risks to privacy not withstanding such measures.    You have chosen to receive care through a telehealth visit.  Do you consent to use a video/audio connection for your medical care today? Yes        Subjective   Honey Thomas is a 35 y.o. female who presents today for follow up    Chief Complaint:  ADHD Symptoms    Accompanied by: Pt was alone for duration of appointment    History of Present Illness:   Pt states that she has been experiencing nausea every morning since starting the Strattera. She has also noticed she is easily agitated on it. There has not been any improvement in ADHD symptoms since starting it. She feels she is more scattered. She finds her symptoms very problematic. Pt denies depression. Pt stopped taking the Buspar because she didn't want to take both medications at the same time. She states then she wouldn't know which one was helping. Pt has situational anxiety at this time, but feels it is manageable. The problem is starting to resolve. Pt has problems falling asleep at night; she is unable to shut her mind off. Appetite is stable. Pt  reports that she is eating healthier. The patient denies any new medical problems or changes in medications since last appointment with this facility. The patient denies any abnormal muscle movements or tics. The patient rates her anxiety at a 4/10 on a 0-10 scale, with 10 being the worst. The patient would like to change her medications at this visit. The patient denies any suicidal or homicidal ideations, plans, or intent at today's encounter and is convincing. The patient denies any auditory hallucinations or visual hallucinations. The patient does not endorse any significant symptoms consistent with lulu or psychosis at today's encounter.      Prior Psychiatric Medications:  Buspar: pt struggled to remember to take multiple times a day  Zoloft: ineffective  Wellbutrin XL: ineffective  Paxil: ineffective  Strattera: nausea, irritability        The following portions of the patient's history were reviewed and updated as appropriate: allergies, current medications, past family history, past medical history, past social history, past surgical history and problem list.          Past Medical History:  Past Medical History:   Diagnosis Date   • Anxiety    • BPPV (benign paroxysmal positional vertigo)    • Carpal tunnel syndrome 2010    bilat   • TMJ (dislocation of temporomandibular joint) 2/4/2021   • Vitamin D deficiency        Social History:  Social History     Socioeconomic History   • Marital status:      Spouse name: Not on file   • Number of children: Not on file   • Years of education: Not on file   • Highest education level: Not on file   Tobacco Use   • Smoking status: Never Smoker   • Smokeless tobacco: Never Used   Substance and Sexual Activity   • Alcohol use: Yes     Comment: social   • Drug use: No   • Sexual activity: Yes     Partners: Male     Birth control/protection: Surgical       Family History:  Family History   Problem Relation Age of Onset   • Hypertension Father    • Diabetes Father     • Hypertension Mother    • Ovarian cancer Mother 37   • Graves' disease Sister    • Scoliosis Sister    • Sleep apnea Sister    • Lung cancer Maternal Grandmother    • Alzheimer's disease Paternal Grandmother    • Other Paternal Grandfather         accident   • Autism Niece        Past Surgical History:  Past Surgical History:   Procedure Laterality Date   • ABDOMINOPLASTY N/A 2019    Procedure: ABDOMINOPLASTY;  Surgeon: Alphonso Vasquez MD;  Location:  [facility];  Service: Plastics   • BREAST AUGMENTATION Bilateral 2019    Procedure: BREAST AUGMENTATION WITH IMPLANTS BILATERAL;  Surgeon: Alphonso Vasquez MD;  Location:  [facility];  Service: Plastics   • FRACTURE SURGERY Left 1995    lEFT FOREARM   • TOTAL LAPAROSCOPIC HYSTERECTOMY SALPINGO OOPHORECTOMY N/A 2019    Procedure: TOTAL ROBOTIC HYSTERECTOMY WITH BILATERAL SALPINGO-OOPHORECTOMY;  Surgeon: Summer Dorsey DO;  Location:  [facility];  Service: DaVinci   • WISDOM TOOTH EXTRACTION         Problem List:  Patient Active Problem List   Diagnosis   •  (normal spontaneous vaginal delivery)   • Vitamin D deficiency   • Anxiety   • BPPV (benign paroxysmal positional vertigo)   • Carpal tunnel syndrome   • TMJ (dislocation of temporomandibular joint)       Allergy:   No Known Allergies     Current Medications:   Current Outpatient Medications   Medication Sig Dispense Refill   • atomoxetine (Strattera) 40 MG capsule Take 1 capsule by mouth Daily for 30 days. 30 capsule 0   • cyclobenzaprine (FLEXERIL) 10 MG tablet Take 1 tablet by mouth 3 (Three) Times a Day As Needed for Muscle Spasms. 30 tablet 1   • estradiol (ESTRACE) 1 MG tablet Take 1 tablet by mouth Daily.     • methylPREDNISolone (MEDROL) 4 MG dose pack Take as directed on package instructions. 21 tablet 0     No current facility-administered medications for this visit.       Review of Symptoms:    Review of Systems   Gastrointestinal: Positive for nausea.   Psychiatric/Behavioral:  Positive for decreased concentration, sleep disturbance and stress.         Physical Exam:   Due to the remote nature of this encounter (virtual encounter), vitals were unable to be obtained.  Height stated at 66 inches.  Weight stated at 184 pounds.         Physical Exam  Neurological:      Mental Status: She is alert.   Psychiatric:         Attention and Perception: Perception normal.         Mood and Affect: Mood and affect normal.         Speech: Speech normal.         Behavior: Behavior normal. Behavior is cooperative.         Thought Content: Thought content normal. Thought content is not paranoid or delusional. Thought content does not include homicidal or suicidal ideation. Thought content does not include homicidal or suicidal plan.         Cognition and Memory: Cognition and memory normal.         Judgment: Judgment is impulsive.      Comments: Easily distracted at times. Overtalkative.           Mental Status Exam:   Hygiene:   good  Cooperation:  Cooperative  Eye Contact:  Good  Psychomotor Behavior:  Restless  Affect:  Full range  Mood: normal  Speech:  Overtalkative  Thought Process:  Goal directed  Thought Content:  Normal  Suicidal:  None  Homicidal:  None  Hallucinations:  None  Delusion:  None  Memory:  Intact  Orientation:  Person, Place, Time and Situation  Reliability:  good  Insight:  Good  Judgement:  Good  Impulse Control:  Fair  Physical/Medical Issues:  No        PHQ-9 Depression Screening  Little interest or pleasure in doing things? 0   Feeling down, depressed, or hopeless? 0   Trouble falling or staying asleep, or sleeping too much? 2   Feeling tired or having little energy? 1   Poor appetite or overeating? 1   Feeling bad about yourself - or that you are a failure or have let yourself or your family down? 0   Trouble concentrating on things, such as reading the newspaper or watching television? 3   Moving or speaking so slowly that other people could have noticed? Or the opposite -  being so fidgety or restless that you have been moving around a lot more than usual? 0   Thoughts that you would be better off dead, or of hurting yourself in some way? 0   PHQ-9 Total Score 7   If you checked off any problems, how difficult have these problems made it for you to do your work, take care of things at home, or get along with other people? Somewhat difficult     PHQ-9 Total Score: 7        DA 7 anxiety screening tool that patient filled out virtually reviewed by this APRN at today's encounter.    PROMIS scale screening tool that patient filled out virtually reviewed by this APRN at today's encounter.    [facility] request number [id number] reviewed by this APRN at today's encounter.    Previous Provider notes and available records reviewed by this APRN at today's encounter.         Lab Results:   No visits with results within 1 Month(s) from this visit.   Latest known visit with results is:   Office Visit on 02/04/2021   Component Date Value Ref Range Status   • Glucose 02/04/2021 76  65 - 99 mg/dL Final   • BUN 02/04/2021 16  6 - 20 mg/dL Final   • Creatinine 02/04/2021 0.67  0.57 - 1.00 mg/dL Final   • Sodium 02/04/2021 141  136 - 145 mmol/L Final   • Potassium 02/04/2021 4.4  3.5 - 5.2 mmol/L Final   • Chloride 02/04/2021 105  98 - 107 mmol/L Final   • CO2 02/04/2021 25.9  22.0 - 29.0 mmol/L Final   • Calcium 02/04/2021 9.4  8.6 - 10.5 mg/dL Final   • Total Protein 02/04/2021 6.5  6.0 - 8.5 g/dL Final   • Albumin 02/04/2021 4.50  3.50 - 5.20 g/dL Final   • ALT (SGPT) 02/04/2021 19  1 - 33 U/L Final   • AST (SGOT) 02/04/2021 20  1 - 32 U/L Final   • Alkaline Phosphatase 02/04/2021 86  39 - 117 U/L Final   • Total Bilirubin 02/04/2021 0.2  0.0 - 1.2 mg/dL Final   • eGFR Non African Amer 02/04/2021 99  >60 mL/min/1.73 Final   • Globulin 02/04/2021 2.0  gm/dL Final   • A/G Ratio 02/04/2021 2.3  g/dL Final   • BUN/Creatinine Ratio 02/04/2021 23.9  7.0 - 25.0 Final   • Anion Gap 02/04/2021 10.1  5.0 - 15.0  mmol/L Final   • Hemoglobin A1C 02/04/2021 5.40  4.80 - 5.60 % Final   • Total Cholesterol 02/04/2021 172  0 - 200 mg/dL Final   • Triglycerides 02/04/2021 66  0 - 150 mg/dL Final   • HDL Cholesterol 02/04/2021 56  40 - 60 mg/dL Final   • LDL Cholesterol  02/04/2021 103* 0 - 100 mg/dL Final   • VLDL Cholesterol 02/04/2021 13  5 - 40 mg/dL Final   • LDL/HDL Ratio 02/04/2021 1.84   Final   • Free T4 02/04/2021 1.29  0.93 - 1.70 ng/dL Final   • TSH 02/04/2021 0.597  0.270 - 4.200 uIU/mL Final   • 25 Hydroxy, Vitamin D 02/04/2021 29.7* 30.0 - 100.0 ng/ml Final   • Vitamin B-12 02/04/2021 480  211 - 946 pg/mL Final   • WBC 02/04/2021 6.68  3.40 - 10.80 10*3/mm3 Final   • RBC 02/04/2021 4.53  3.77 - 5.28 10*6/mm3 Final   • Hemoglobin 02/04/2021 14.0  12.0 - 15.9 g/dL Final   • Hematocrit 02/04/2021 41.2  34.0 - 46.6 % Final   • MCV 02/04/2021 90.9  79.0 - 97.0 fL Final   • MCH 02/04/2021 30.9  26.6 - 33.0 pg Final   • MCHC 02/04/2021 34.0  31.5 - 35.7 g/dL Final   • RDW 02/04/2021 12.2* 12.3 - 15.4 % Final   • RDW-SD 02/04/2021 40.5  37.0 - 54.0 fl Final   • MPV 02/04/2021 10.9  6.0 - 12.0 fL Final   • Platelets 02/04/2021 252  140 - 450 10*3/mm3 Final   • Neutrophil % 02/04/2021 63.8  42.7 - 76.0 % Final   • Lymphocyte % 02/04/2021 23.4  19.6 - 45.3 % Final   • Monocyte % 02/04/2021 9.1  5.0 - 12.0 % Final   • Eosinophil % 02/04/2021 2.1  0.3 - 6.2 % Final   • Basophil % 02/04/2021 1.3  0.0 - 1.5 % Final   • Immature Grans % 02/04/2021 0.3  0.0 - 0.5 % Final   • Neutrophils, Absolute 02/04/2021 4.26  1.70 - 7.00 10*3/mm3 Final   • Lymphocytes, Absolute 02/04/2021 1.56  0.70 - 3.10 10*3/mm3 Final   • Monocytes, Absolute 02/04/2021 0.61  0.10 - 0.90 10*3/mm3 Final   • Eosinophils, Absolute 02/04/2021 0.14  0.00 - 0.40 10*3/mm3 Final   • Basophils, Absolute 02/04/2021 0.09  0.00 - 0.20 10*3/mm3 Final   • Immature Grans, Absolute 02/04/2021 0.02  0.00 - 0.05 10*3/mm3 Final   • nRBC 02/04/2021 0.0  0.0 - 0.2 /100 WBC Final          Assessment/Plan   Problems Addressed this Visit     None      Visit Diagnoses     Attention deficit hyperactivity disorder, combined type  (Chronic)   -  Primary    Relevant Orders    Urine Drug Screen - Urine, Clean Catch    Anxiety disorder, unspecified type  (Chronic)       Sleep difficulties  (Chronic)         Diagnoses       Codes Comments    Attention deficit hyperactivity disorder, combined type    -  Primary ICD-10-CM: F90.2  ICD-9-CM: 314.01     Anxiety disorder, unspecified type     ICD-10-CM: F41.9  ICD-9-CM: 300.00     Sleep difficulties     ICD-10-CM: G47.9  ICD-9-CM: 780.50           Visit Diagnoses:    ICD-10-CM ICD-9-CM   1. Attention deficit hyperactivity disorder, combined type  F90.2 314.01   2. Anxiety disorder, unspecified type  F41.9 300.00   3. Sleep difficulties  G47.9 780.50          GOALS:  Short Term Goals: Patient will be compliant with medication, and patient will have no significant medication related side effects.  Patient will be engaged in psychotherapy as indicated.  Patient will report subjective improvement of symptoms.  Long term goals: To stabilize mood and treat/improve subjective symptoms, the patient will stay out of the hospital, the patient will be at an optimal level of functioning, and the patient will take all medications as prescribed.  The patient verbalized understanding and agreement with goals that were mutually set.      TREATMENT PLAN: Continue supportive psychotherapy efforts and medications as indicated. Discontinue Buspar; pt has already done so on her own. Discontinue Strattera due to side effects and ineffectiveness. Obtain UDS. Pending UDS, will order Adderall XR 10 mg PO QAM for ADHD. Medication and treatment options, both pharmacological and non-pharmacological treatment options, discussed during today's visit, including any off label use of medication. Patient acknowledged and verbally consented with current treatment plan and was educated on the  "importance of compliance with treatment and follow-up appointments.        Controlled Substance Medication Contract    Controlled substance medications (i.e. benzodiazepines, opioids, amphetamines) are very useful, but have a high potential for misuse and are, therefore, closely controlled by local, state, and federal government(s). As a patient of [facility], you agree and understand the followin. I am responsible for the controlled substance medications prescribed to me. If my prescription is misplaced, stolen, or if \"I run out early,\" I understand this medication will not be replaced regardless of the circumstances.  2. Refills of controlled substance medications: (a) Will be made only during regular office hours Monday-Thursday once a month and during a scheduled virtual appointment. Refills will not be made at night, weekends, or on holidays. (b) Will not be made if \"I lost my prescriptions,\" \"ran out early,\" or \"misplaced my medication\". I am solely responsible for taking the medication as prescribed and for keeping track of the remaining.   3. I agree to comply with urine drug testing and pill counts at the provider's discretion, thereby, documenting the proper use of any medications. If alcohol abuse is suspected, a breathalyzer or blood alcohol level may be ordered. Unannounced urine or serum toxicology specimens may be requested and my cooperation is required.  4. I understand that if I violate any of the above conditions, my prescriptions for controlled medications my be terminated. If the violation involves obtaining these medications from another individual, or the concomitant use of non-prescription illicit (illegal) drugs, I may also be reported to other providers, pharmacies, medical facilities and the appropriate authorities.   5. I further understand that if I violate this controlled substance contract due to non-compliance of medical directions, such as the " failure in taking medications as prescribed, utilizing other illicit drugs, or abuse of controlled medications, the prescription for controlled medications may be terminated.   6. I agree to keep my scheduled appointments and conduct myself in a courteous manner.  7. I agree not to sell, share, or give any medication to another person. I understand that such mishandling of my medication is a serious violation of this agreement and would result in my treatment being terminated without any recourse for appeal.   8. I agree not to take my medication from any physicians, nurse practitioners, pharmacies or other sources without telling my prescriber.  9. I agree to take my medication as my prescriber has instructed and not to alter the way I take my medication without first consulting my prescriber.  10. I agree to abstain from problematic alcohol usage, opioids, marijuana, cocaine, and other addictive substances.   11. If I am legally involved related to legal or illegal drugs, including alcohol, refill of controlled substances will not be given until a re-evaluation of my chemical dependency treatment plan has been completed. Refills are at the discretion of the prescriber.   12. I agree to fill all of my controlled medications at an in-state ([place]) pharmacy.   13. I understand that [facility] utilizes the [place] All Schedule Prescription Electronic Reporting (GO) system and will monitor my prescription history via this source.  14. Benzodiazepines are drugs prescribed to treat conditions like anxiety, insomnia, and seizures. Examples of these drugs include: alprazolam, clonazepam, diazepam, and lorazepam. The FDA has applied a Black Box Warning (one of the strictest warnings) that the use of opioids and benzodiazepines together have serious risks to include unusual dizziness or lightheadedness, extreme sleepiness, slowed or difficult breathing, coma and death. There is an added  risk if alcohol is also ingested. It is the policy of [facility] to NOT prescribe benzodiazepines to patients who also use opioids. If a patient already presents already prescribed both, the prescriber my direct the patient to their previous provider who prescribed it or taper the benzodiazepine as part of the treatment plan. These patients must be monitored at appropriate intervals and so visits may be more frequent.     This APRN has discussed and reviewed this information with the patient and/or guardian. The patient and/or guardian verbally agreed (no signatures are obtained during today's visit as they are a telehealth patient and is unable to print and sign this document, therefore, verbal agreement has been obtained).       MEDICATION ISSUES:    Discussed treatment plan and medication options of prescribed medication as well as the risks, benefits, any black box warnings, and side effects including potential falls, possible impaired driving, and metabolic adversities among others, including any off label use of medication. Patient is agreeable to call the office with any worsening of symptoms or onset of side effects, or if any concerns or questions arise.  The contact information for the office is made available to the patient. Patient is agreeable to call 911 or go to the nearest ER should they begin having any SI/HI, or if any urgent concerns arise. No medication side effects or related complaints today.       MEDS ORDERED DURING VISIT:  No orders of the defined types were placed in this encounter.      Return in about 4 weeks (around 4/13/2021), or if symptoms worsen or fail to improve, for Recheck.     Treatment plan completed on 2/17/21    Progress toward goal: Not at goal    Functional Status: Moderate impairment     Prognosis: Good with Ongoing Treatment         This document has been electronically signed by MICHEL Geurrero  March 16, 2021 08:41 EDT    Please note  that portions of this note were completed with a voice recognition program. Efforts were made to edit dictation, but occasionally words are mistranscribed.

## 2021-03-17 ENCOUNTER — APPOINTMENT (OUTPATIENT)
Dept: MRI IMAGING | Facility: HOSPITAL | Age: 36
End: 2021-03-17

## 2021-03-22 ENCOUNTER — TELEPHONE (OUTPATIENT)
Dept: PSYCHIATRY | Facility: CLINIC | Age: 36
End: 2021-03-22

## 2021-03-22 DIAGNOSIS — F90.2 ATTENTION DEFICIT HYPERACTIVITY DISORDER, COMBINED TYPE: Primary | ICD-10-CM

## 2021-03-22 RX ORDER — DEXTROAMPHETAMINE SACCHARATE, AMPHETAMINE ASPARTATE MONOHYDRATE, DEXTROAMPHETAMINE SULFATE AND AMPHETAMINE SULFATE 2.5; 2.5; 2.5; 2.5 MG/1; MG/1; MG/1; MG/1
10 CAPSULE, EXTENDED RELEASE ORAL EVERY MORNING
Qty: 30 CAPSULE | Refills: 0 | Status: SHIPPED | OUTPATIENT
Start: 2021-03-22 | End: 2021-04-08 | Stop reason: DRUGHIGH

## 2021-03-23 ENCOUNTER — TELEPHONE (OUTPATIENT)
Dept: FAMILY MEDICINE CLINIC | Facility: CLINIC | Age: 36
End: 2021-03-23

## 2021-03-23 DIAGNOSIS — Z79.890 HORMONE REPLACEMENT THERAPY: Primary | ICD-10-CM

## 2021-03-23 RX ORDER — ESTRADIOL 1 MG/1
1 TABLET ORAL DAILY
Qty: 14 TABLET | Refills: 0 | Status: SHIPPED | OUTPATIENT
Start: 2021-03-23 | End: 2021-04-19

## 2021-03-23 NOTE — TELEPHONE ENCOUNTER
2 weeks of estradiol sent to pharmacy. Must obtain rx from gyn.  
Patient has been trying to reach her OB-GYN office regarding her estradiol 1 mg and cannot get a call back. She is wanting to know if we can send in a 2 week supply until her April 13 th appt? She uses [facility] on [address].   
no

## 2021-03-26 ENCOUNTER — APPOINTMENT (OUTPATIENT)
Dept: MRI IMAGING | Facility: HOSPITAL | Age: 36
End: 2021-03-26

## 2021-04-08 ENCOUNTER — TELEMEDICINE (OUTPATIENT)
Dept: PSYCHIATRY | Facility: CLINIC | Age: 36
End: 2021-04-08

## 2021-04-08 DIAGNOSIS — F90.2 ATTENTION DEFICIT HYPERACTIVITY DISORDER, COMBINED TYPE: Primary | Chronic | ICD-10-CM

## 2021-04-08 PROCEDURE — 99213 OFFICE O/P EST LOW 20 MIN: CPT | Performed by: NURSE PRACTITIONER

## 2021-04-08 RX ORDER — DEXTROAMPHETAMINE SACCHARATE, AMPHETAMINE ASPARTATE MONOHYDRATE, DEXTROAMPHETAMINE SULFATE AND AMPHETAMINE SULFATE 3.75; 3.75; 3.75; 3.75 MG/1; MG/1; MG/1; MG/1
15 CAPSULE, EXTENDED RELEASE ORAL EVERY MORNING
Qty: 30 CAPSULE | Refills: 0 | Status: SHIPPED | OUTPATIENT
Start: 2021-04-08 | End: 2021-05-05 | Stop reason: DRUGHIGH

## 2021-04-08 NOTE — PROGRESS NOTES
"This provider is located at the [facility] (through [facility]), [address], [place], [address] using a secure [employer] Video Visit through [employer]. Patient is being seen remotely via telehealth at their home address in [place], and stated they are in a secure environment for this session. The patient's condition being diagnosed/treated is appropriate for telemedicine. The provider identified herself as well as her credentials.   The patient, and/or patients guardian, consent to be seen remotely, and when consent is given they understand that the consent allows for patient identifiable information to be sent to a third party as needed.   They may refuse to be seen remotely at any time. The electronic data is encrypted and password protected, and the patient and/or guardian has been advised of the potential risks to privacy not withstanding such measures.    You have chosen to receive care through a telehealth visit.  Do you consent to use a video/audio connection for your medical care today? Yes        Subjective   Honey Thomas is a 35 y.o. female who presents today for follow up    Chief Complaint:  ADHD     Accompanied by: Pt was alone for duration of appointment    History of Present Illness:   Pt states the past month has been \"really good\". Pt states that she wishes she didn't wait until she was almost 38 to address the problem. She takes the Adderall XR at 7AM and wears off around 2-3PM. Her attention and concentration have significantly improved and has a positive impact on her work and family life. Pt states that she is able to watch television with her  now and pay attention during [employer] meetings. Her  has commented that she is more involved in conversation and present. Pt is happier and her anxiety no longer exists. The anxiety appears to have been manifested by ADHD. She finds that she is more patient and less irritable. She is able to " handle changes. She is able to complete tasks without starting ten different ones. Sleep has also improved. She falls asleep faster and no longer has racing obsessive thoughts. Slightly decreased appetite, but she also made life changes. It is easier for her to make healthier food choices. The patient denies any new medical problems or changes in medications since last appointment with this facility. The patient reports compliance with current medication regimen. The patient denies any current side effects from her current medication regimen. The patient denies any abnormal muscle movements or tics. The patient would like to increase their medications at this visit. The patient denies any suicidal or homicidal ideations, plans, or intent at today's encounter and is convincing. The patient denies any auditory hallucinations or visual hallucinations. The patient does not endorse any significant symptoms consistent with lulu or psychosis at today's encounter.        Prior Psychiatric Medications:  Buspar: pt struggled to remember to take multiple times a day  Zoloft: ineffective  Wellbutrin XL: ineffective  Paxil: ineffective  Strattera: nausea, irritability        The following portions of the patient's history were reviewed and updated as appropriate: allergies, current medications, past family history, past medical history, past social history, past surgical history and problem list.          Past Medical History:  Past Medical History:   Diagnosis Date   • Anxiety    • BPPV (benign paroxysmal positional vertigo)    • Carpal tunnel syndrome 2010    bilat   • TMJ (dislocation of temporomandibular joint) 2/4/2021   • Vitamin D deficiency        Social History:  Social History     Socioeconomic History   • Marital status:      Spouse name: Not on file   • Number of children: Not on file   • Years of education: Not on file   • Highest education level: Not on file   Tobacco Use   • Smoking status: Never Smoker    • Smokeless tobacco: Never Used   Substance and Sexual Activity   • Alcohol use: Yes     Comment: social   • Drug use: No   • Sexual activity: Yes     Partners: Male     Birth control/protection: Surgical       Family History:  Family History   Problem Relation Age of Onset   • Hypertension Father    • Diabetes Father    • Hypertension Mother    • Ovarian cancer Mother 37   • Graves' disease Sister    • Scoliosis Sister    • Sleep apnea Sister    • Lung cancer Maternal Grandmother    • Alzheimer's disease Paternal Grandmother    • Other Paternal Grandfather         accident   • Autism Niece        Past Surgical History:  Past Surgical History:   Procedure Laterality Date   • ABDOMINOPLASTY N/A 2019    Procedure: ABDOMINOPLASTY;  Surgeon: Alphonso Vasquez MD;  Location:  [facility];  Service: Plastics   • BREAST AUGMENTATION Bilateral 2019    Procedure: BREAST AUGMENTATION WITH IMPLANTS BILATERAL;  Surgeon: Alphonso Vasquez MD;  Location:  [facility];  Service: Plastics   • FRACTURE SURGERY Left 1995    lEFT FOREARM   • TOTAL LAPAROSCOPIC HYSTERECTOMY SALPINGO OOPHORECTOMY N/A 2019    Procedure: TOTAL ROBOTIC HYSTERECTOMY WITH BILATERAL SALPINGO-OOPHORECTOMY;  Surgeon: Summer Dorsey DO;  Location:  [facility];  Service: DaVinci   • WISDOM TOOTH EXTRACTION         Problem List:  Patient Active Problem List   Diagnosis   •  (normal spontaneous vaginal delivery)   • Vitamin D deficiency   • Anxiety   • BPPV (benign paroxysmal positional vertigo)   • Carpal tunnel syndrome   • TMJ (dislocation of temporomandibular joint)       Allergy:   No Known Allergies     Current Medications:   Current Outpatient Medications   Medication Sig Dispense Refill   • amphetamine-dextroamphetamine XR (Adderall XR) 15 MG 24 hr capsule Take 1 capsule by mouth Every Morning for 30 days 30 capsule 0   • cyclobenzaprine (FLEXERIL) 10 MG tablet Take 1 tablet by mouth 3 (Three) Times a Day As Needed for Muscle Spasms.  30 tablet 1   • estradiol (ESTRACE) 1 MG tablet Take 1 tablet by mouth Daily. 14 tablet 0     No current facility-administered medications for this visit.       Review of Symptoms:    Review of Systems   Constitutional: Negative.    Psychiatric/Behavioral: Negative.          Physical Exam:   Due to the remote nature of this encounter (virtual encounter), vitals were unable to be obtained.  Height stated at 66 inches.  Weight stated at 184 pounds.         Physical Exam  Neurological:      Mental Status: She is alert.   Psychiatric:         Attention and Perception: Attention and perception normal.         Mood and Affect: Mood and affect normal.         Speech: Speech normal.         Behavior: Behavior normal. Behavior is cooperative.         Thought Content: Thought content normal. Thought content is not paranoid or delusional. Thought content does not include homicidal or suicidal ideation. Thought content does not include homicidal or suicidal plan.         Cognition and Memory: Cognition and memory normal.         Judgment: Judgment normal.           Mental Status Exam:   Hygiene:   good  Cooperation:  Cooperative  Eye Contact:  Good  Psychomotor Behavior:  Appropriate  Affect:  Full range  Mood: normal  Speech:  Normal  Thought Process:  Goal directed  Thought Content:  Normal  Suicidal:  None  Homicidal:  None  Hallucinations:  None  Delusion:  None  Memory:  Intact  Orientation:  Person, Place, Time and Situation  Reliability:  good  Insight:  Good  Judgement:  Good  Impulse Control:  Good  Physical/Medical Issues:  No        PHQ-9 Depression Screening  Little interest or pleasure in doing things? 0   Feeling down, depressed, or hopeless? 0   Trouble falling or staying asleep, or sleeping too much? 0   Feeling tired or having little energy? 0   Poor appetite or overeating? 0   Feeling bad about yourself - or that you are a failure or have let yourself or your family down? 0   Trouble concentrating on things,  such as reading the newspaper or watching television? 1   Moving or speaking so slowly that other people could have noticed? Or the opposite - being so fidgety or restless that you have been moving around a lot more than usual? 0   Thoughts that you would be better off dead, or of hurting yourself in some way? 0   PHQ-9 Total Score 1   If you checked off any problems, how difficult have these problems made it for you to do your work, take care of things at home, or get along with other people? Not difficult at all     PHQ-9 Total Score: 1        DA 7 anxiety screening tool that patient filled out virtually reviewed by this APRN at today's encounter.    PROMIS scale screening tool that patient filled out virtually reviewed by this APRN at today's encounter.    Previous Provider notes and available records reviewed by this APRN at today's encounter.         Lab Results:   Telemedicine on 03/16/2021   Component Date Value Ref Range Status   • THC, Screen, Urine 03/16/2021 Negative  Negative Final   • Phencyclidine (PCP), Urine 03/16/2021 Negative  Negative Final   • Cocaine Screen, Urine 03/16/2021 Negative  Negative Final   • Methamphetamine, Ur 03/16/2021 Negative  Negative Final   • Opiate Screen 03/16/2021 Negative  Negative Final   • Amphetamine Screen, Urine 03/16/2021 Negative  Negative Final   • Benzodiazepine Screen, Urine 03/16/2021 Negative  Negative Final   • Tricyclic Antidepressants Screen 03/16/2021 Negative  Negative Final   • Methadone Screen, Urine 03/16/2021 Negative  Negative Final   • Barbiturates Screen, Urine 03/16/2021 Negative  Negative Final   • Oxycodone Screen, Urine 03/16/2021 Negative  Negative Final   • Propoxyphene Screen 03/16/2021 Negative  Negative Final   • Buprenorphine, Screen, Urine 03/16/2021 Negative  Negative Final         Assessment/Plan   Problems Addressed this Visit     None      Visit Diagnoses     Attention deficit hyperactivity disorder, combined type  (Chronic)   -   Primary    Relevant Medications    amphetamine-dextroamphetamine XR (Adderall XR) 15 MG 24 hr capsule      Diagnoses       Codes Comments    Attention deficit hyperactivity disorder, combined type    -  Primary ICD-10-CM: F90.2  ICD-9-CM: 314.01           Visit Diagnoses:    ICD-10-CM ICD-9-CM   1. Attention deficit hyperactivity disorder, combined type  F90.2 314.01          GOALS:  Short Term Goals: Patient will be compliant with medication, and patient will have no significant medication related side effects.  Patient will be engaged in psychotherapy as indicated.  Patient will report subjective improvement of symptoms.  Long term goals: To stabilize mood and treat/improve subjective symptoms, the patient will stay out of the hospital, the patient will be at an optimal level of functioning, and the patient will take all medications as prescribed.  The patient verbalized understanding and agreement with goals that were mutually set.      TREATMENT PLAN:   -Increase Adderall XR to 15 mg PO QAM for ADHD    Medication and treatment options, both pharmacological and non-pharmacological treatment options, discussed during today's visit, including any off label use of medication. Patient acknowledged and verbally consented with current treatment plan and was educated on the importance of compliance with treatment and follow-up appointments.        MEDICATION ISSUES:    Discussed treatment plan and medication options of prescribed medication as well as the risks, benefits, any black box warnings, and side effects including potential falls, possible impaired driving, and metabolic adversities among others, including any off label use of medication. Patient is agreeable to call the office with any worsening of symptoms or onset of side effects, or if any concerns or questions arise.  The contact information for the office is made available to the patient. Patient is agreeable to call 911 or go to the nearest ER should they  begin having any SI/HI, or if any urgent concerns arise. No medication side effects or related complaints today.       MEDS ORDERED DURING VISIT:  New Medications Ordered This Visit   Medications   • amphetamine-dextroamphetamine XR (Adderall XR) 15 MG 24 hr capsule     Sig: Take 1 capsule by mouth Every Morning for 30 days     Dispense:  30 capsule     Refill:  0       Return in about 4 weeks (around 5/6/2021), or if symptoms worsen or fail to improve, for Recheck.     Treatment plan completed on 2/17/21    Progress toward goal: Not at goal    Functional Status: Mild impairment     Prognosis: Good with Ongoing Treatment         This document has been electronically signed by MICHEL Guerrero  April 8, 2021 08:34 EDT    Please note that portions of this note were completed with a voice recognition program. Efforts were made to edit dictation, but occasionally words are mistranscribed.

## 2021-04-09 ENCOUNTER — TELEPHONE (OUTPATIENT)
Dept: FAMILY MEDICINE CLINIC | Facility: CLINIC | Age: 36
End: 2021-04-09

## 2021-04-13 DIAGNOSIS — Z79.890 HORMONE REPLACEMENT THERAPY: ICD-10-CM

## 2021-04-13 RX ORDER — ESTRADIOL 1 MG/1
1 TABLET ORAL DAILY
Qty: 30 TABLET | Refills: 0 | Status: CANCELLED | OUTPATIENT
Start: 2021-04-13

## 2021-04-19 DIAGNOSIS — Z79.890 HORMONE REPLACEMENT THERAPY: ICD-10-CM

## 2021-04-19 RX ORDER — ESTRADIOL 1 MG/1
TABLET ORAL
Qty: 14 TABLET | Refills: 0 | Status: SHIPPED | OUTPATIENT
Start: 2021-04-19

## 2021-05-05 ENCOUNTER — TELEMEDICINE (OUTPATIENT)
Dept: PSYCHIATRY | Facility: CLINIC | Age: 36
End: 2021-05-05

## 2021-05-05 DIAGNOSIS — F41.9 ANXIETY DISORDER, UNSPECIFIED TYPE: Chronic | ICD-10-CM

## 2021-05-05 DIAGNOSIS — F90.2 ATTENTION DEFICIT HYPERACTIVITY DISORDER, COMBINED TYPE: Primary | Chronic | ICD-10-CM

## 2021-05-05 PROCEDURE — 99214 OFFICE O/P EST MOD 30 MIN: CPT | Performed by: NURSE PRACTITIONER

## 2021-05-05 RX ORDER — DEXTROAMPHETAMINE SACCHARATE, AMPHETAMINE ASPARTATE, DEXTROAMPHETAMINE SULFATE AND AMPHETAMINE SULFATE 2.5; 2.5; 2.5; 2.5 MG/1; MG/1; MG/1; MG/1
TABLET ORAL
Qty: 30 TABLET | Refills: 0 | Status: SHIPPED | OUTPATIENT
Start: 2021-05-05 | End: 2021-06-30 | Stop reason: SDUPTHER

## 2021-05-05 RX ORDER — DEXTROAMPHETAMINE SACCHARATE, AMPHETAMINE ASPARTATE MONOHYDRATE, DEXTROAMPHETAMINE SULFATE AND AMPHETAMINE SULFATE 3.75; 3.75; 3.75; 3.75 MG/1; MG/1; MG/1; MG/1
15 CAPSULE, EXTENDED RELEASE ORAL EVERY MORNING
Qty: 30 CAPSULE | Refills: 0 | Status: SHIPPED | OUTPATIENT
Start: 2021-05-05 | End: 2021-06-02 | Stop reason: SDUPTHER

## 2021-05-05 NOTE — PROGRESS NOTES
This provider is located at the [facility] (through [facility]), [address], [place], [address] using a secure PS DEPT.hart Video Visit through [employer]. Patient is being seen remotely via telehealth at their home address in [place], and stated they are in a secure environment for this session. The patient's condition being diagnosed/treated is appropriate for telemedicine. The provider identified herself as well as her credentials.   The patient, and/or patients guardian, consent to be seen remotely, and when consent is given they understand that the consent allows for patient identifiable information to be sent to a third party as needed.   They may refuse to be seen remotely at any time. The electronic data is encrypted and password protected, and the patient and/or guardian has been advised of the potential risks to privacy not withstanding such measures.    You have chosen to receive care through a telehealth visit.  Do you consent to use a video/audio connection for your medical care today? Yes        Subjective   Honey Thomas is a 35 y.o. female who presents today for follow up    Chief Complaint:  ADHD     Accompanied by: Pt was alone for duration of appointment    History of Present Illness:   Pt reports that things have been going well this past month. Pt states her job performance has improved because now she can complete tasks from start to finish. She is able to do this at home as well. Pt seems to have more patience than she used to. Pt used to be constantly worried about everything that needs to be completed, but she no longer experiences this. Pt finds she is more flexible. Pt notices that it wears off around 3 or 3:30PM. Pt does need it to last longer. The increase in dosages only gave pt an additional hour. Pt is mildly irritable when it wears off, but was more irritable when she wasn't on the medication. Anxiety has decreased since starting the  Adderall XR. Mood is more stable. Pt is able to enjoy things. Sleep has improved since starting Adderall XR. Appetite is stable. It was mildly decreased when she initially started two months ago, but that side effect has dissipated. The patient denies any new medical problems or changes in medications since last appointment with this facility. The patient reports compliance with current medication regimen. The patient denies any current side effects from her current medication regimen. The patient denies any abnormal muscle movements or tics. The patient would like to adjust their medications at this visit. The patient denies any suicidal or homicidal ideations, plans, or intent at today's encounter and is convincing.  The patient denies any auditory hallucinations or visual hallucinations. The patient does not endorse any significant symptoms consistent with lulu or psychosis at today's encounter.        Prior Psychiatric Medications:  Buspar: pt struggled to remember to take multiple times a day  Zoloft: ineffective  Wellbutrin XL: ineffective  Paxil: ineffective  Strattera: nausea, irritability        The following portions of the patient's history were reviewed and updated as appropriate: allergies, current medications, past family history, past medical history, past social history, past surgical history and problem list.          Past Medical History:  Past Medical History:   Diagnosis Date   • Anxiety    • BPPV (benign paroxysmal positional vertigo)    • Carpal tunnel syndrome 2010    bilat   • TMJ (dislocation of temporomandibular joint) 2/4/2021   • Vitamin D deficiency        Social History:  Social History     Socioeconomic History   • Marital status:      Spouse name: Not on file   • Number of children: Not on file   • Years of education: Not on file   • Highest education level: Not on file   Tobacco Use   • Smoking status: Never Smoker   • Smokeless tobacco: Never Used   Substance and Sexual  Activity   • Alcohol use: Yes     Comment: social   • Drug use: No   • Sexual activity: Yes     Partners: Male     Birth control/protection: Surgical       Family History:  Family History   Problem Relation Age of Onset   • Hypertension Father    • Diabetes Father    • Hypertension Mother    • Ovarian cancer Mother 37   • Graves' disease Sister    • Scoliosis Sister    • Sleep apnea Sister    • Lung cancer Maternal Grandmother    • Alzheimer's disease Paternal Grandmother    • Other Paternal Grandfather         accident   • Autism Niece        Past Surgical History:  Past Surgical History:   Procedure Laterality Date   • ABDOMINOPLASTY N/A 2019    Procedure: ABDOMINOPLASTY;  Surgeon: Alphonso Vasquez MD;  Location:  [facility];  Service: Plastics   • BREAST AUGMENTATION Bilateral 2019    Procedure: BREAST AUGMENTATION WITH IMPLANTS BILATERAL;  Surgeon: Alphonso Vasquez MD;  Location:  [facility];  Service: Plastics   • FRACTURE SURGERY Left 1995    lEFT FOREARM   • TOTAL LAPAROSCOPIC HYSTERECTOMY SALPINGO OOPHORECTOMY N/A 2019    Procedure: TOTAL ROBOTIC HYSTERECTOMY WITH BILATERAL SALPINGO-OOPHORECTOMY;  Surgeon: Summer Dorsey DO;  Location:  [facility];  Service: DaVinci   • WISDOM TOOTH EXTRACTION         Problem List:  Patient Active Problem List   Diagnosis   •  (normal spontaneous vaginal delivery)   • Vitamin D deficiency   • Anxiety   • BPPV (benign paroxysmal positional vertigo)   • Carpal tunnel syndrome   • TMJ (dislocation of temporomandibular joint)       Allergy:   No Known Allergies     Current Medications:   Current Outpatient Medications   Medication Sig Dispense Refill   • amphetamine-dextroamphetamine (Adderall) 10 MG tablet Take 1/2-1 tablet PO every afternoon 30 tablet 0   • amphetamine-dextroamphetamine XR (Adderall XR) 15 MG 24 hr capsule Take 1 capsule by mouth Every Morning for 30 days 30 capsule 0   • cyclobenzaprine (FLEXERIL) 10 MG tablet Take 1 tablet by mouth 3  (Three) Times a Day As Needed for Muscle Spasms. 30 tablet 1   • estradiol (ESTRACE) 1 MG tablet TAKE 1 TABLET BY MOUTH EVERY DAY 14 tablet 0     No current facility-administered medications for this visit.       Review of Symptoms:    Review of Systems   Constitutional: Negative.    Psychiatric/Behavioral: Negative.          Physical Exam:   Due to the remote nature of this encounter (virtual encounter), vitals were unable to be obtained.  Height stated at 66 inches.  Weight stated at 184 pounds.         Physical Exam  Neurological:      Mental Status: She is alert.   Psychiatric:         Attention and Perception: Attention and perception normal.         Mood and Affect: Mood and affect normal.         Speech: Speech normal.         Behavior: Behavior normal. Behavior is cooperative.         Thought Content: Thought content normal. Thought content is not paranoid or delusional. Thought content does not include homicidal or suicidal ideation. Thought content does not include homicidal or suicidal plan.         Cognition and Memory: Cognition and memory normal.         Judgment: Judgment normal.      Comments: Pt was notably more relaxed today.            Mental Status Exam:   Hygiene:   good  Cooperation:  Cooperative  Eye Contact:  Good  Psychomotor Behavior:  Appropriate  Affect:  Full range  Mood: normal  Speech:  Normal  Thought Process:  Linear  Thought Content:  Normal  Suicidal:  None  Homicidal:  None  Hallucinations:  None  Delusion:  None  Memory:  Intact  Orientation:  Person, Place, Time and Situation  Reliability:  good  Insight:  Good  Judgement:  Good  Impulse Control:  Good  Physical/Medical Issues:  No        PHQ-9 Depression Screening  Little interest or pleasure in doing things? 0   Feeling down, depressed, or hopeless? 0   Trouble falling or staying asleep, or sleeping too much? 0   Feeling tired or having little energy? 0   Poor appetite or overeating? 0   Feeling bad about yourself - or that  you are a failure or have let yourself or your family down? 0   Trouble concentrating on things, such as reading the newspaper or watching television? 1   Moving or speaking so slowly that other people could have noticed? Or the opposite - being so fidgety or restless that you have been moving around a lot more than usual? 0   Thoughts that you would be better off dead, or of hurting yourself in some way? 0   PHQ-9 Total Score 1   If you checked off any problems, how difficult have these problems made it for you to do your work, take care of things at home, or get along with other people? Not difficult at all     PHQ-9 Total Score: 1        DA 7 anxiety screening tool that patient filled out virtually reviewed by this APRN at today's encounter.    PROMIS scale screening tool that patient filled out virtually reviewed by this APRN at today's encounter.    Previous Provider notes and available records reviewed by this APRN at today's encounter.         Lab Results:   No visits with results within 1 Month(s) from this visit.   Latest known visit with results is:   Telemedicine on 03/16/2021   Component Date Value Ref Range Status   • THC, Screen, Urine 03/16/2021 Negative  Negative Final   • Phencyclidine (PCP), Urine 03/16/2021 Negative  Negative Final   • Cocaine Screen, Urine 03/16/2021 Negative  Negative Final   • Methamphetamine, Ur 03/16/2021 Negative  Negative Final   • Opiate Screen 03/16/2021 Negative  Negative Final   • Amphetamine Screen, Urine 03/16/2021 Negative  Negative Final   • Benzodiazepine Screen, Urine 03/16/2021 Negative  Negative Final   • Tricyclic Antidepressants Screen 03/16/2021 Negative  Negative Final   • Methadone Screen, Urine 03/16/2021 Negative  Negative Final   • Barbiturates Screen, Urine 03/16/2021 Negative  Negative Final   • Oxycodone Screen, Urine 03/16/2021 Negative  Negative Final   • Propoxyphene Screen 03/16/2021 Negative  Negative Final   • Buprenorphine, Screen, Urine  03/16/2021 Negative  Negative Final         Assessment/Plan   Problems Addressed this Visit     None      Visit Diagnoses     Attention deficit hyperactivity disorder, combined type  (Chronic)   -  Primary    Relevant Medications    amphetamine-dextroamphetamine XR (Adderall XR) 15 MG 24 hr capsule    amphetamine-dextroamphetamine (Adderall) 10 MG tablet    Anxiety disorder, unspecified type  (Chronic)       Relevant Medications    amphetamine-dextroamphetamine XR (Adderall XR) 15 MG 24 hr capsule    amphetamine-dextroamphetamine (Adderall) 10 MG tablet      Diagnoses       Codes Comments    Attention deficit hyperactivity disorder, combined type    -  Primary ICD-10-CM: F90.2  ICD-9-CM: 314.01     Anxiety disorder, unspecified type     ICD-10-CM: F41.9  ICD-9-CM: 300.00           Visit Diagnoses:    ICD-10-CM ICD-9-CM   1. Attention deficit hyperactivity disorder, combined type  F90.2 314.01   2. Anxiety disorder, unspecified type  F41.9 300.00          GOALS:  Short Term Goals: Patient will be compliant with medication, and patient will have no significant medication related side effects.  Patient will be engaged in psychotherapy as indicated.  Patient will report subjective improvement of symptoms.  Long term goals: To stabilize mood and treat/improve subjective symptoms, the patient will stay out of the hospital, the patient will be at an optimal level of functioning, and the patient will take all medications as prescribed.  The patient verbalized understanding and agreement with goals that were mutually set.      TREATMENT PLAN:   -Continue Adderall XR 15 mg PO QAM for ADHD  -Start Adderall 5-10 mg PO every afternoon for ADHD. Pt only got an additional hour when her dose of Adderall XR was increased last appointment.     Medication and treatment options, both pharmacological and non-pharmacological treatment options, discussed during today's visit, including any off label use of medication. Patient acknowledged  and verbally consented with current treatment plan and was educated on the importance of compliance with treatment and follow-up appointments.        MEDICATION ISSUES:    Discussed treatment plan and medication options of prescribed medication as well as the risks, benefits, any black box warnings, and side effects including potential falls, possible impaired driving, and metabolic adversities among others, including any off label use of medication. Patient is agreeable to call the office with any worsening of symptoms or onset of side effects, or if any concerns or questions arise.  The contact information for the office is made available to the patient. Patient is agreeable to call 911 or go to the nearest ER should they begin having any SI/HI, or if any urgent concerns arise. No medication side effects or related complaints today.       MEDS ORDERED DURING VISIT:  New Medications Ordered This Visit   Medications   • amphetamine-dextroamphetamine XR (Adderall XR) 15 MG 24 hr capsule     Sig: Take 1 capsule by mouth Every Morning for 30 days     Dispense:  30 capsule     Refill:  0   • amphetamine-dextroamphetamine (Adderall) 10 MG tablet     Sig: Take 1/2-1 tablet PO every afternoon     Dispense:  30 tablet     Refill:  0       Return in about 4 weeks (around 6/2/2021), or if symptoms worsen or fail to improve, for Recheck.     Treatment plan completed on 2/17/21    Progress toward goal: Not at goal    Functional Status: Mild impairment     Prognosis: Good with Ongoing Treatment         This document has been electronically signed by MICHEL Guerrero  May 5, 2021 08:34 EDT    Please note that portions of this note were completed with a voice recognition program. Efforts were made to edit dictation, but occasionally words are mistranscribed.

## 2021-06-02 ENCOUNTER — TELEMEDICINE (OUTPATIENT)
Dept: PSYCHIATRY | Facility: CLINIC | Age: 36
End: 2021-06-02

## 2021-06-02 DIAGNOSIS — F90.2 ATTENTION DEFICIT HYPERACTIVITY DISORDER, COMBINED TYPE: Primary | Chronic | ICD-10-CM

## 2021-06-02 DIAGNOSIS — F41.9 ANXIETY DISORDER, UNSPECIFIED TYPE: Chronic | ICD-10-CM

## 2021-06-02 PROCEDURE — 99214 OFFICE O/P EST MOD 30 MIN: CPT | Performed by: NURSE PRACTITIONER

## 2021-06-02 RX ORDER — DEXTROAMPHETAMINE SACCHARATE, AMPHETAMINE ASPARTATE MONOHYDRATE, DEXTROAMPHETAMINE SULFATE AND AMPHETAMINE SULFATE 3.75; 3.75; 3.75; 3.75 MG/1; MG/1; MG/1; MG/1
15 CAPSULE, EXTENDED RELEASE ORAL EVERY MORNING
Qty: 30 CAPSULE | Refills: 0 | Status: SHIPPED | OUTPATIENT
Start: 2021-06-02 | End: 2021-07-02

## 2021-06-02 NOTE — PROGRESS NOTES
This provider is located at the [facility] (through [facility]), [address] [place] [address], [facility], [address] using a secure [employer] Video Visit through [employer]. Patient is being seen remotely via telehealth at their home address in [place], and stated they are in a secure environment for this session. The patient's condition being diagnosed/treated is appropriate for telemedicine. The provider identified herself as well as her credentials.   The patient, and/or patients guardian, consent to be seen remotely, and when consent is given they understand that the consent allows for patient identifiable information to be sent to a third party as needed.   They may refuse to be seen remotely at any time. The electronic data is encrypted and password protected, and the patient and/or guardian has been advised of the potential risks to privacy not withstanding such measures.    You have chosen to receive care through a telehealth visit.  Do you consent to use a video/audio connection for your medical care today? Yes        Subjective   Honey Thomas is a 35 y.o. female who presents today for follow up    Chief Complaint:  ADHD     Accompanied by: Pt was alone for duration of appointment    History of Present Illness:   Pt states that she had a rough night due to having two sick toddlers. Pt reports that she is doing well overall. Pt doesn't always take the Adderall in the afternoon, only when she needs it. She takes 5 mg opposed to 10 mg. Her irritability has decreased even further on the Adderall. Pt is able to focus and concentrate better. Sleep is stable other than last night. Appetite stable. Pt has been doing intermittent fasting. Pt has been working at [employer] more often. 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 School is out for the summer. The patient denies any new medical problems or changes in medications since last appointment with this  facility. The patient reports compliance with current medication regimen. The patient denies any current side effects from her current medication regimen. The patient denies any abnormal muscle movements or tics. The patient would like to not adjust or change their medications at this visit. The patient denies any suicidal or homicidal ideations, plans, or intent at today's encounter and is convincing.  The patient denies any auditory hallucinations or visual hallucinations. The patient does not endorse any significant symptoms consistent with lulu or psychosis at today's encounter.        Prior Psychiatric Medications:  Buspar: pt struggled to remember to take multiple times a day  Zoloft: ineffective  Wellbutrin XL: ineffective  Paxil: ineffective  Strattera: nausea, irritability        The following portions of the patient's history were reviewed and updated as appropriate: allergies, current medications, past family history, past medical history, past social history, past surgical history and problem list.          Past Medical History:  Past Medical History:   Diagnosis Date   • Anxiety    • BPPV (benign paroxysmal positional vertigo)    • Carpal tunnel syndrome 2010    bilat   • TMJ (dislocation of temporomandibular joint) 2/4/2021   • Vitamin D deficiency        Social History:  Social History     Socioeconomic History   • Marital status:      Spouse name: Not on file   • Number of children: Not on file   • Years of education: Not on file   • Highest education level: Not on file   Tobacco Use   • Smoking status: Never Smoker   • Smokeless tobacco: Never Used   Substance and Sexual Activity   • Alcohol use: Yes     Comment: social   • Drug use: No   • Sexual activity: Yes     Partners: Male     Birth control/protection: Surgical       Family History:  Family History   Problem Relation Age of Onset   • Hypertension Father    • Diabetes Father    • Hypertension Mother    • Ovarian cancer Mother 37   •  Graves' disease Sister    • Scoliosis Sister    • Sleep apnea Sister    • Lung cancer Maternal Grandmother    • Alzheimer's disease Paternal Grandmother    • Other Paternal Grandfather         accident   • Autism Niece        Past Surgical History:  Past Surgical History:   Procedure Laterality Date   • ABDOMINOPLASTY N/A 2019    Procedure: ABDOMINOPLASTY;  Surgeon: Alphonso Vasquez MD;  Location:  [facility];  Service: Plastics   • BREAST AUGMENTATION Bilateral 2019    Procedure: BREAST AUGMENTATION WITH IMPLANTS BILATERAL;  Surgeon: Alphonso Vasquez MD;  Location:  [facility];  Service: Plastics   • FRACTURE SURGERY Left 1995    lEFT FOREARM   • TOTAL LAPAROSCOPIC HYSTERECTOMY SALPINGO OOPHORECTOMY N/A 2019    Procedure: TOTAL ROBOTIC HYSTERECTOMY WITH BILATERAL SALPINGO-OOPHORECTOMY;  Surgeon: Summer Dorsey DO;  Location:  [facility];  Service: DaVinci   • WISDOM TOOTH EXTRACTION         Problem List:  Patient Active Problem List   Diagnosis   •  (normal spontaneous vaginal delivery)   • Vitamin D deficiency   • Anxiety   • BPPV (benign paroxysmal positional vertigo)   • Carpal tunnel syndrome   • TMJ (dislocation of temporomandibular joint)       Allergy:   No Known Allergies     Current Medications:   Current Outpatient Medications   Medication Sig Dispense Refill   • amphetamine-dextroamphetamine (Adderall) 10 MG tablet Take 1/2-1 tablet PO every afternoon 30 tablet 0   • amphetamine-dextroamphetamine XR (Adderall XR) 15 MG 24 hr capsule Take 1 capsule by mouth Every Morning for 30 days 30 capsule 0   • cyclobenzaprine (FLEXERIL) 10 MG tablet Take 1 tablet by mouth 3 (Three) Times a Day As Needed for Muscle Spasms. 30 tablet 1   • estradiol (ESTRACE) 1 MG tablet TAKE 1 TABLET BY MOUTH EVERY DAY 14 tablet 0     No current facility-administered medications for this visit.       Review of Symptoms:    Review of Systems   Constitutional: Negative.    Psychiatric/Behavioral: Negative.   Positive for stress.         Physical Exam:   Due to the remote nature of this encounter (virtual encounter), vitals were unable to be obtained.  Height stated at 66 inches.  Weight stated at 184 pounds.         Physical Exam  Neurological:      Mental Status: She is alert.   Psychiatric:         Attention and Perception: Attention and perception normal.         Mood and Affect: Mood and affect normal.         Speech: Speech normal.         Behavior: Behavior normal. Behavior is cooperative.         Thought Content: Thought content normal. Thought content is not paranoid or delusional. Thought content does not include homicidal or suicidal ideation. Thought content does not include homicidal or suicidal plan.         Cognition and Memory: Cognition and memory normal.         Judgment: Judgment normal.           Mental Status Exam:   Hygiene:   good  Cooperation:  Cooperative  Eye Contact:  Good  Psychomotor Behavior:  Appropriate  Affect:  Full range  Mood: normal  Speech:  Normal  Thought Process:  Goal directed  Thought Content:  Normal  Suicidal:  None  Homicidal:  None  Hallucinations:  None  Delusion:  None  Memory:  Intact  Orientation:  Person, Place, Time and Situation  Reliability:  good  Insight:  Good  Judgement:  Good  Impulse Control:  Good  Physical/Medical Issues:  No          [facility] request number [id number] reviewed by this APRN at today's encounter.    Previous Provider notes and available records reviewed by this APRN at today's encounter.         Lab Results:   No visits with results within 1 Month(s) from this visit.   Latest known visit with results is:   Telemedicine on 03/16/2021   Component Date Value Ref Range Status   • THC, Screen, Urine 03/16/2021 Negative  Negative Final   • Phencyclidine (PCP), Urine 03/16/2021 Negative  Negative Final   • Cocaine Screen, Urine 03/16/2021 Negative  Negative Final   • Methamphetamine, Ur 03/16/2021 Negative  Negative Final   • Opiate Screen 03/16/2021  Negative  Negative Final   • Amphetamine Screen, Urine 03/16/2021 Negative  Negative Final   • Benzodiazepine Screen, Urine 03/16/2021 Negative  Negative Final   • Tricyclic Antidepressants Screen 03/16/2021 Negative  Negative Final   • Methadone Screen, Urine 03/16/2021 Negative  Negative Final   • Barbiturates Screen, Urine 03/16/2021 Negative  Negative Final   • Oxycodone Screen, Urine 03/16/2021 Negative  Negative Final   • Propoxyphene Screen 03/16/2021 Negative  Negative Final   • Buprenorphine, Screen, Urine 03/16/2021 Negative  Negative Final         Assessment/Plan   Problems Addressed this Visit     None      Visit Diagnoses     Attention deficit hyperactivity disorder, combined type  (Chronic)   -  Primary    Relevant Medications    amphetamine-dextroamphetamine XR (Adderall XR) 15 MG 24 hr capsule    Anxiety disorder, unspecified type  (Chronic)       Relevant Medications    amphetamine-dextroamphetamine XR (Adderall XR) 15 MG 24 hr capsule      Diagnoses       Codes Comments    Attention deficit hyperactivity disorder, combined type    -  Primary ICD-10-CM: F90.2  ICD-9-CM: 314.01     Anxiety disorder, unspecified type     ICD-10-CM: F41.9  ICD-9-CM: 300.00           Visit Diagnoses:    ICD-10-CM ICD-9-CM   1. Attention deficit hyperactivity disorder, combined type  F90.2 314.01   2. Anxiety disorder, unspecified type  F41.9 300.00          GOALS:  Short Term Goals: Patient will be compliant with medication, and patient will have no significant medication related side effects.  Patient will be engaged in psychotherapy as indicated.  Patient will report subjective improvement of symptoms.  Long term goals: To stabilize mood and treat/improve subjective symptoms, the patient will stay out of the hospital, the patient will be at an optimal level of functioning, and the patient will take all medications as prescribed.  The patient verbalized understanding and agreement with goals that were mutually  set.      TREATMENT PLAN:   -Continue Adderall XR 15 mg PO QAM for ADHD  -Continue Adderall 5-10 mg PO every afternoon for ADHD. Pt only got an additional hour when her dose of Adderall XR was increased last appointment.     Medication and treatment options, both pharmacological and non-pharmacological treatment options, discussed during today's visit, including any off label use of medication. Patient acknowledged and verbally consented with current treatment plan and was educated on the importance of compliance with treatment and follow-up appointments.        MEDICATION ISSUES:    Discussed treatment plan and medication options of prescribed medication as well as the risks, benefits, any black box warnings, and side effects including potential falls, possible impaired driving, and metabolic adversities among others, including any off label use of medication. Patient is agreeable to call the office with any worsening of symptoms or onset of side effects, or if any concerns or questions arise.  The contact information for the office is made available to the patient. Patient is agreeable to call 911 or go to the nearest ER should they begin having any SI/HI, or if any urgent concerns arise. No medication side effects or related complaints today.       MEDS ORDERED DURING VISIT:  New Medications Ordered This Visit   Medications   • amphetamine-dextroamphetamine XR (Adderall XR) 15 MG 24 hr capsule     Sig: Take 1 capsule by mouth Every Morning for 30 days     Dispense:  30 capsule     Refill:  0       Return in about 4 weeks (around 6/30/2021), or if symptoms worsen or fail to improve, for Recheck.     Treatment plan completed on 2/17/21    Progress toward goal: Not at goal    Functional Status: Mild impairment     Prognosis: Good with Ongoing Treatment         This document has been electronically signed by MICHEL Guerrero  June 2, 2021 09:57 EDT    Please note that portions of this note were completed with a  voice recognition program. Efforts were made to edit dictation, but occasionally words are mistranscribed.

## 2021-06-30 ENCOUNTER — TELEMEDICINE (OUTPATIENT)
Dept: PSYCHIATRY | Facility: CLINIC | Age: 36
End: 2021-06-30

## 2021-06-30 DIAGNOSIS — F90.2 ATTENTION DEFICIT HYPERACTIVITY DISORDER, COMBINED TYPE: Primary | Chronic | ICD-10-CM

## 2021-06-30 DIAGNOSIS — F41.9 ANXIETY DISORDER, UNSPECIFIED TYPE: Chronic | ICD-10-CM

## 2021-06-30 PROCEDURE — 99214 OFFICE O/P EST MOD 30 MIN: CPT | Performed by: NURSE PRACTITIONER

## 2021-06-30 RX ORDER — HYDROXYZINE HYDROCHLORIDE 25 MG/1
TABLET, FILM COATED ORAL
Qty: 180 TABLET | Refills: 0 | Status: SHIPPED | OUTPATIENT
Start: 2021-06-30 | End: 2022-07-27 | Stop reason: SDUPTHER

## 2021-06-30 RX ORDER — DEXTROAMPHETAMINE SACCHARATE, AMPHETAMINE ASPARTATE, DEXTROAMPHETAMINE SULFATE AND AMPHETAMINE SULFATE 2.5; 2.5; 2.5; 2.5 MG/1; MG/1; MG/1; MG/1
TABLET ORAL
Qty: 30 TABLET | Refills: 0 | Status: SHIPPED | OUTPATIENT
Start: 2021-06-30 | End: 2021-07-28 | Stop reason: SDUPTHER

## 2021-06-30 NOTE — PROGRESS NOTES
"This provider is located at the [facility] (through [facility]), [address], [facility], [address] using a secure [employer] Video Visit through [employer]. Patient is being seen remotely via telehealth at their home address in [place], and stated they are in a secure environment for this session. The patient's condition being diagnosed/treated is appropriate for telemedicine. The provider identified herself as well as her credentials.   The patient, and/or patients guardian, consent to be seen remotely, and when consent is given they understand that the consent allows for patient identifiable information to be sent to a third party as needed.   They may refuse to be seen remotely at any time. The electronic data is encrypted and password protected, and the patient and/or guardian has been advised of the potential risks to privacy not withstanding such measures.    You have chosen to receive care through a telehealth visit.  Do you consent to use a video/audio connection for your medical care today? Yes        Subjective   Honey Thomas is a 35 y.o. female who presents today for follow up    Chief Complaint:  ADHD and anxiety    Accompanied by: Pt was alone for duration of appointment    History of Present Illness:     Pt reports that she has been having some issues with anxiety this past week. It has been feeling as if someone is choking her; she has been feeling pressure. It started Friday and she felt it constantly until Sunday. She is feeling it a little this morning. Pt is having some issues with her mother-in-law, pt believes that it may be bothering her more than she originally thought. Pt and her  sold her home so they can build one and have a mother-in-law suite. Pt's  is black. Her mother-in-law has a group of friends who have a problem with the way pt looks.     One of her mother-in-law's friends said, \"I just can't with Honey and their white " "people shit.\" The lady felt pt took over her father-in-law's . Pt took her mother-in-law to the  home and was involved with the entire process. Her mother-in-law's friends said that pt has a hidden agenda with her living with them and claim that pt is just wanting to take her mother-in-law's money despite her not having any. It was a mutual decision between all of them not her mother-in-law not to live with them. Pt feels this is for the best. Pt admits that she worries about what could happen.     Pt continues to benefit from the use of a stimulant. Their ability to concentrate and focus has improved; they are not as easily distracted. Pt feels more productive in their daily lives as time management skills have also improved. Sleep continues to be stable as is appetite.     The patient denies any new medical problems or changes in medications since last appointment with this facility. The patient reports compliance with current medication regimen. The patient denies any current side effects from her current medication regimen. The patient denies any suicidal or homicidal ideations, plans, or intent at today's encounter and is convincing.  The patient denies any auditory hallucinations or visual hallucinations. The patient does not endorse any significant symptoms consistent with lulu or psychosis at today's encounter.        Prior Psychiatric Medications:  Buspar: pt struggled to remember to take multiple times a day  Zoloft: ineffective  Wellbutrin XL: ineffective  Paxil: ineffective  Strattera: nausea, irritability        The following portions of the patient's history were reviewed and updated as appropriate: allergies, current medications, past family history, past medical history, past social history, past surgical history and problem list.          Past Medical History:  Past Medical History:   Diagnosis Date   • Anxiety    • BPPV (benign paroxysmal positional vertigo)    • Carpal tunnel " syndrome 2010    bilat   • TMJ (dislocation of temporomandibular joint) 2021   • Vitamin D deficiency        Social History:  Social History     Socioeconomic History   • Marital status:      Spouse name: Not on file   • Number of children: Not on file   • Years of education: Not on file   • Highest education level: Not on file   Tobacco Use   • Smoking status: Never Smoker   • Smokeless tobacco: Never Used   Substance and Sexual Activity   • Alcohol use: Yes     Comment: social   • Drug use: No   • Sexual activity: Yes     Partners: Male     Birth control/protection: Surgical       Family History:  Family History   Problem Relation Age of Onset   • Hypertension Father    • Diabetes Father    • Hypertension Mother    • Ovarian cancer Mother 37   • Graves' disease Sister    • Scoliosis Sister    • Sleep apnea Sister    • Lung cancer Maternal Grandmother    • Alzheimer's disease Paternal Grandmother    • Other Paternal Grandfather         accident   • Autism Niece        Past Surgical History:  Past Surgical History:   Procedure Laterality Date   • ABDOMINOPLASTY N/A 2019    Procedure: ABDOMINOPLASTY;  Surgeon: Alphonso Vasquez MD;  Location:  [facility];  Service: Plastics   • BREAST AUGMENTATION Bilateral 2019    Procedure: BREAST AUGMENTATION WITH IMPLANTS BILATERAL;  Surgeon: Alphonso Vasquez MD;  Location:  [facility];  Service: Plastics   • FRACTURE SURGERY Left 1995    lEFT FOREARM   • TOTAL LAPAROSCOPIC HYSTERECTOMY SALPINGO OOPHORECTOMY N/A 2019    Procedure: TOTAL ROBOTIC HYSTERECTOMY WITH BILATERAL SALPINGO-OOPHORECTOMY;  Surgeon: Summer Dorsey DO;  Location:  [facility];  Service: DaVinci   • WISDOM TOOTH EXTRACTION         Problem List:  Patient Active Problem List   Diagnosis   •  (normal spontaneous vaginal delivery)   • Vitamin D deficiency   • Anxiety   • BPPV (benign paroxysmal positional vertigo)   • Carpal tunnel syndrome   • TMJ (dislocation of  temporomandibular joint)       Allergy:   No Known Allergies     Current Medications:   Current Outpatient Medications   Medication Sig Dispense Refill   • amphetamine-dextroamphetamine (Adderall) 10 MG tablet Take 1/2-1 tablet PO every afternoon PRN 30 tablet 0   • amphetamine-dextroamphetamine XR (Adderall XR) 15 MG 24 hr capsule Take 1 capsule by mouth Every Morning for 30 days 30 capsule 0   • cyclobenzaprine (FLEXERIL) 10 MG tablet Take 1 tablet by mouth 3 (Three) Times a Day As Needed for Muscle Spasms. 30 tablet 1   • estradiol (ESTRACE) 1 MG tablet TAKE 1 TABLET BY MOUTH EVERY DAY 14 tablet 0   • hydrOXYzine (ATARAX) 25 MG tablet Take 1-2 tablets PO TID PRN for anxiety/sleep 180 tablet 0     No current facility-administered medications for this visit.       Review of Symptoms:    Review of Systems   Constitutional: Negative.    Psychiatric/Behavioral: Positive for stress. The patient is nervous/anxious.          Physical Exam:   Due to the remote nature of this encounter (virtual encounter), vitals were unable to be obtained.  Height stated at 66 inches.  Weight stated at 184 pounds.         Physical Exam  Neurological:      Mental Status: She is alert.   Psychiatric:         Attention and Perception: Attention and perception normal.         Mood and Affect: Affect normal. Mood is anxious.         Speech: Speech normal.         Behavior: Behavior normal. Behavior is cooperative.         Thought Content: Thought content normal. Thought content is not paranoid or delusional. Thought content does not include homicidal or suicidal ideation. Thought content does not include homicidal or suicidal plan.         Cognition and Memory: Cognition and memory normal.         Judgment: Judgment normal.           Mental Status Exam:   Hygiene:   good  Cooperation:  Cooperative  Eye Contact:  Good  Psychomotor Behavior:  Appropriate  Affect:  Appropriate  Mood: anxious  Speech:  Normal  Thought Process:  Goal  directed  Thought Content:  Normal  Suicidal:  None  Homicidal:  None  Hallucinations:  None  Delusion:  None  Memory:  Intact  Orientation:  Person, Place, Time and Situation  Reliability:  good  Insight:  Good  Judgement:  Good  Impulse Control:  Good  Physical/Medical Issues:  No          Previous Provider notes and available records reviewed by this APRN at today's encounter.         Lab Results:   No visits with results within 1 Month(s) from this visit.   Latest known visit with results is:   Telemedicine on 03/16/2021   Component Date Value Ref Range Status   • THC, Screen, Urine 03/16/2021 Negative  Negative Final   • Phencyclidine (PCP), Urine 03/16/2021 Negative  Negative Final   • Cocaine Screen, Urine 03/16/2021 Negative  Negative Final   • Methamphetamine, Ur 03/16/2021 Negative  Negative Final   • Opiate Screen 03/16/2021 Negative  Negative Final   • Amphetamine Screen, Urine 03/16/2021 Negative  Negative Final   • Benzodiazepine Screen, Urine 03/16/2021 Negative  Negative Final   • Tricyclic Antidepressants Screen 03/16/2021 Negative  Negative Final   • Methadone Screen, Urine 03/16/2021 Negative  Negative Final   • Barbiturates Screen, Urine 03/16/2021 Negative  Negative Final   • Oxycodone Screen, Urine 03/16/2021 Negative  Negative Final   • Propoxyphene Screen 03/16/2021 Negative  Negative Final   • Buprenorphine, Screen, Urine 03/16/2021 Negative  Negative Final         Assessment/Plan   Problems Addressed this Visit     None      Visit Diagnoses     Attention deficit hyperactivity disorder, combined type  (Chronic)   -  Primary    Relevant Medications    amphetamine-dextroamphetamine (Adderall) 10 MG tablet    hydrOXYzine (ATARAX) 25 MG tablet    Anxiety disorder, unspecified type  (Chronic)       Relevant Medications    amphetamine-dextroamphetamine (Adderall) 10 MG tablet    hydrOXYzine (ATARAX) 25 MG tablet      Diagnoses       Codes Comments    Attention deficit hyperactivity disorder,  combined type    -  Primary ICD-10-CM: F90.2  ICD-9-CM: 314.01     Anxiety disorder, unspecified type     ICD-10-CM: F41.9  ICD-9-CM: 300.00           Visit Diagnoses:    ICD-10-CM ICD-9-CM   1. Attention deficit hyperactivity disorder, combined type  F90.2 314.01   2. Anxiety disorder, unspecified type  F41.9 300.00          GOALS:  Short Term Goals: Patient will be compliant with medication, and patient will have no significant medication related side effects.  Patient will be engaged in psychotherapy as indicated.  Patient will report subjective improvement of symptoms.  Long term goals: To stabilize mood and treat/improve subjective symptoms, the patient will stay out of the hospital, the patient will be at an optimal level of functioning, and the patient will take all medications as prescribed.  The patient verbalized understanding and agreement with goals that were mutually set.      TREATMENT PLAN:   -Continue Adderall XR 15 mg PO QAM for ADHD  -Continue Adderall 5-10 mg PO every afternoon PRN for ADHD. Pt only got an additional hour when her dose of Adderall XR was increased last appointment.   -Start hydroxyzine 25-50 mg PO TID PRN for anxiety    Medication and treatment options, both pharmacological and non-pharmacological treatment options, discussed during today's visit, including any off label use of medication. Patient acknowledged and verbally consented with current treatment plan and was educated on the importance of compliance with treatment and follow-up appointments.        MEDICATION ISSUES:    Discussed treatment plan and medication options of prescribed medication as well as the risks, benefits, any black box warnings, and side effects including potential falls, possible impaired driving, and metabolic adversities among others, including any off label use of medication. Patient is agreeable to call the office with any worsening of symptoms or onset of side effects, or if any concerns or  questions arise.  The contact information for the office is made available to the patient. Patient is agreeable to call 911 or go to the nearest ER should they begin having any SI/HI, or if any urgent concerns arise. No medication side effects or related complaints today.       MEDS ORDERED DURING VISIT:  New Medications Ordered This Visit   Medications   • amphetamine-dextroamphetamine (Adderall) 10 MG tablet     Sig: Take 1/2-1 tablet PO every afternoon PRN     Dispense:  30 tablet     Refill:  0     Pt is also taking Adderall XR   • hydrOXYzine (ATARAX) 25 MG tablet     Sig: Take 1-2 tablets PO TID PRN for anxiety/sleep     Dispense:  180 tablet     Refill:  0       Return in about 4 weeks (around 7/28/2021), or if symptoms worsen or fail to improve, for Recheck.     Treatment plan completed on 2/17/21    Progress toward goal: Not at goal    Functional Status: Mild impairment     Prognosis: Good with Ongoing Treatment         This document has been electronically signed by MICHEL Guerrero  June 30, 2021 08:53 EDT    Please note that portions of this note were completed with a voice recognition program. Efforts were made to edit dictation, but occasionally words are mistranscribed.

## 2021-07-28 ENCOUNTER — TELEMEDICINE (OUTPATIENT)
Dept: PSYCHIATRY | Facility: CLINIC | Age: 36
End: 2021-07-28

## 2021-07-28 DIAGNOSIS — F41.9 ANXIETY DISORDER, UNSPECIFIED TYPE: Chronic | ICD-10-CM

## 2021-07-28 DIAGNOSIS — F90.2 ATTENTION DEFICIT HYPERACTIVITY DISORDER, COMBINED TYPE: Primary | Chronic | ICD-10-CM

## 2021-07-28 PROCEDURE — 99214 OFFICE O/P EST MOD 30 MIN: CPT | Performed by: NURSE PRACTITIONER

## 2021-07-28 RX ORDER — DEXTROAMPHETAMINE SACCHARATE, AMPHETAMINE ASPARTATE, DEXTROAMPHETAMINE SULFATE AND AMPHETAMINE SULFATE 2.5; 2.5; 2.5; 2.5 MG/1; MG/1; MG/1; MG/1
TABLET ORAL
Qty: 30 TABLET | Refills: 0 | Status: SHIPPED | OUTPATIENT
Start: 2021-07-28 | End: 2021-08-25 | Stop reason: SDUPTHER

## 2021-07-28 RX ORDER — DEXTROAMPHETAMINE SACCHARATE, AMPHETAMINE ASPARTATE MONOHYDRATE, DEXTROAMPHETAMINE SULFATE AND AMPHETAMINE SULFATE 3.75; 3.75; 3.75; 3.75 MG/1; MG/1; MG/1; MG/1
15 CAPSULE, EXTENDED RELEASE ORAL EVERY MORNING
Qty: 30 CAPSULE | Refills: 0 | Status: SHIPPED | OUTPATIENT
Start: 2021-07-28 | End: 2021-08-25 | Stop reason: SDUPTHER

## 2021-07-28 NOTE — PROGRESS NOTES
This provider is located at the [facility] (through [facility]), [address], [place], [address] using a secure [employer] Video Visit through [employer]. Patient is being seen remotely via telehealth at their home address in [place], and stated they are in a secure environment for this session. The patient's condition being diagnosed/treated is appropriate for telemedicine. The provider identified herself as well as her credentials.   The patient, and/or patients guardian, consent to be seen remotely, and when consent is given they understand that the consent allows for patient identifiable information to be sent to a third party as needed.   They may refuse to be seen remotely at any time. The electronic data is encrypted and password protected, and the patient and/or guardian has been advised of the potential risks to privacy not withstanding such measures.    You have chosen to receive care through a telehealth visit.  Do you consent to use a video/audio connection for your medical care today? Yes        Subjective   Honey Thomas is a 35 y.o. female who presents today for follow up    Chief Complaint:  ADHD and anxiety    Accompanied by: Pt was alone for duration of appointment    History of Present Illness:   Pt reports that she hasn't had as many stressors this month. 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 It is estimated her house will be completed in December, but pt feels it may be longer or at least to close on it. Pt continues to benefit from a stimulant. Pt has taken the hydroxyzine a few times and states that it helps. Sleep and appetite stable as is overall mood. The patient denies any new medical problems or changes in medications since last appointment with this facility. The patient reports compliance with current medication regimen. The patient denies any current side  effects from her current medication regimen. The patient denies any abnormal muscle movements or tics. The patient would like to not adjust or change their medications at this visit. The patient denies any suicidal or homicidal ideations, plans, or intent at today's encounter and is convincing. The patient denies any auditory hallucinations or visual hallucinations. The patient does not endorse any significant symptoms consistent with lulu or psychosis at today's encounter.        Prior Psychiatric Medications:  Buspar: pt struggled to remember to take multiple times a day  Zoloft: ineffective  Wellbutrin XL: ineffective  Paxil: ineffective  Strattera: nausea, irritability        The following portions of the patient's history were reviewed and updated as appropriate: allergies, current medications, past family history, past medical history, past social history, past surgical history and problem list.          Past Medical History:  Past Medical History:   Diagnosis Date   • Anxiety    • BPPV (benign paroxysmal positional vertigo)    • Carpal tunnel syndrome 2010    bilat   • TMJ (dislocation of temporomandibular joint) 2/4/2021   • Vitamin D deficiency        Social History:  Social History     Socioeconomic History   • Marital status:      Spouse name: Not on file   • Number of children: Not on file   • Years of education: Not on file   • Highest education level: Not on file   Tobacco Use   • Smoking status: Never Smoker   • Smokeless tobacco: Never Used   Substance and Sexual Activity   • Alcohol use: Yes     Comment: social   • Drug use: No   • Sexual activity: Yes     Partners: Male     Birth control/protection: Surgical       Family History:  Family History   Problem Relation Age of Onset   • Hypertension Father    • Diabetes Father    • Hypertension Mother    • Ovarian cancer Mother 37   • Graves' disease Sister    • Scoliosis Sister    • Sleep apnea Sister    • Lung cancer Maternal Grandmother    •  Alzheimer's disease Paternal Grandmother    • Other Paternal Grandfather         accident   • Autism Niece        Past Surgical History:  Past Surgical History:   Procedure Laterality Date   • ABDOMINOPLASTY N/A 2019    Procedure: ABDOMINOPLASTY;  Surgeon: Alphonso Vasquez MD;  Location:  [facility];  Service: Plastics   • BREAST AUGMENTATION Bilateral 2019    Procedure: BREAST AUGMENTATION WITH IMPLANTS BILATERAL;  Surgeon: Alphonso Vasquez MD;  Location:  [facility];  Service: Plastics   • FRACTURE SURGERY Left 1995    lEFT FOREARM   • TOTAL LAPAROSCOPIC HYSTERECTOMY SALPINGO OOPHORECTOMY N/A 2019    Procedure: TOTAL ROBOTIC HYSTERECTOMY WITH BILATERAL SALPINGO-OOPHORECTOMY;  Surgeon: Summer Dorsey DO;  Location:  [facility];  Service: DaVinci   • WISDOM TOOTH EXTRACTION         Problem List:  Patient Active Problem List   Diagnosis   •  (normal spontaneous vaginal delivery)   • Vitamin D deficiency   • Anxiety   • BPPV (benign paroxysmal positional vertigo)   • Carpal tunnel syndrome   • TMJ (dislocation of temporomandibular joint)       Allergy:   No Known Allergies     Current Medications:   Current Outpatient Medications   Medication Sig Dispense Refill   • amphetamine-dextroamphetamine (Adderall) 10 MG tablet Take 1/2-1 tablet PO every afternoon PRN 30 tablet 0   • amphetamine-dextroamphetamine XR (Adderall XR) 15 MG 24 hr capsule Take 1 capsule by mouth Every Morning for 30 days 30 capsule 0   • cyclobenzaprine (FLEXERIL) 10 MG tablet Take 1 tablet by mouth 3 (Three) Times a Day As Needed for Muscle Spasms. 30 tablet 1   • estradiol (ESTRACE) 1 MG tablet TAKE 1 TABLET BY MOUTH EVERY DAY 14 tablet 0   • hydrOXYzine (ATARAX) 25 MG tablet Take 1-2 tablets PO TID PRN for anxiety/sleep 180 tablet 0     No current facility-administered medications for this visit.       Review of Symptoms:    Review of Systems   Constitutional: Negative.    Psychiatric/Behavioral: The patient is  nervous/anxious.          Physical Exam:   Due to the remote nature of this encounter (virtual encounter), vitals were unable to be obtained.  Height stated at 66 inches.  Weight stated at 170 pounds.         Physical Exam  Neurological:      Mental Status: She is alert.   Psychiatric:         Attention and Perception: Attention and perception normal.         Mood and Affect: Mood and affect normal.         Speech: Speech normal.         Behavior: Behavior normal. Behavior is cooperative.         Thought Content: Thought content normal. Thought content is not paranoid or delusional. Thought content does not include homicidal or suicidal ideation. Thought content does not include homicidal or suicidal plan.         Cognition and Memory: Cognition and memory normal.         Judgment: Judgment normal.           Mental Status Exam:   Hygiene:   good  Cooperation:  Cooperative  Eye Contact:  Good  Psychomotor Behavior:  Appropriate  Affect:  Appropriate  Mood: normal  Speech:  Normal  Thought Process:  Linear  Thought Content:  Normal  Suicidal:  None  Homicidal:  None  Hallucinations:  None  Delusion:  None  Memory:  Intact  Orientation:  Person, Place, Time and Situation  Reliability:  good  Insight:  Good  Judgement:  Good  Impulse Control:  Good  Physical/Medical Issues:  No          Previous Provider notes and available records reviewed by this APRN at today's encounter.         Lab Results:   No visits with results within 1 Month(s) from this visit.   Latest known visit with results is:   Telemedicine on 03/16/2021   Component Date Value Ref Range Status   • THC, Screen, Urine 03/16/2021 Negative  Negative Final   • Phencyclidine (PCP), Urine 03/16/2021 Negative  Negative Final   • Cocaine Screen, Urine 03/16/2021 Negative  Negative Final   • Methamphetamine, Ur 03/16/2021 Negative  Negative Final   • Opiate Screen 03/16/2021 Negative  Negative Final   • Amphetamine Screen, Urine 03/16/2021 Negative  Negative Final    • Benzodiazepine Screen, Urine 03/16/2021 Negative  Negative Final   • Tricyclic Antidepressants Screen 03/16/2021 Negative  Negative Final   • Methadone Screen, Urine 03/16/2021 Negative  Negative Final   • Barbiturates Screen, Urine 03/16/2021 Negative  Negative Final   • Oxycodone Screen, Urine 03/16/2021 Negative  Negative Final   • Propoxyphene Screen 03/16/2021 Negative  Negative Final   • Buprenorphine, Screen, Urine 03/16/2021 Negative  Negative Final         Assessment/Plan   Problems Addressed this Visit     None      Visit Diagnoses     Attention deficit hyperactivity disorder, combined type  (Chronic)   -  Primary    Relevant Medications    amphetamine-dextroamphetamine (Adderall) 10 MG tablet    amphetamine-dextroamphetamine XR (Adderall XR) 15 MG 24 hr capsule    Anxiety disorder, unspecified type  (Chronic)       Relevant Medications    amphetamine-dextroamphetamine (Adderall) 10 MG tablet    amphetamine-dextroamphetamine XR (Adderall XR) 15 MG 24 hr capsule      Diagnoses       Codes Comments    Attention deficit hyperactivity disorder, combined type    -  Primary ICD-10-CM: F90.2  ICD-9-CM: 314.01     Anxiety disorder, unspecified type     ICD-10-CM: F41.9  ICD-9-CM: 300.00           Visit Diagnoses:    ICD-10-CM ICD-9-CM   1. Attention deficit hyperactivity disorder, combined type  F90.2 314.01   2. Anxiety disorder, unspecified type  F41.9 300.00          GOALS:  Short Term Goals: Patient will be compliant with medication, and patient will have no significant medication related side effects.  Patient will be engaged in psychotherapy as indicated.  Patient will report subjective improvement of symptoms.  Long term goals: To stabilize mood and treat/improve subjective symptoms, the patient will stay out of the hospital, the patient will be at an optimal level of functioning, and the patient will take all medications as prescribed.  The patient verbalized understanding and agreement with goals that  were mutually set.      TREATMENT PLAN:   -Continue Adderall XR 15 mg PO QAM for ADHD  -Continue Adderall 5-10 mg PO every afternoon PRN for ADHD  -Continue hydroxyzine 25-50 mg PO TID PRN for anxiety    Medication and treatment options, both pharmacological and non-pharmacological treatment options, discussed during today's visit, including any off label use of medication. Patient acknowledged and verbally consented with current treatment plan and was educated on the importance of compliance with treatment and follow-up appointments.        MEDICATION ISSUES:    Discussed treatment plan and medication options of prescribed medication as well as the risks, benefits, any black box warnings, and side effects including potential falls, possible impaired driving, and metabolic adversities among others, including any off label use of medication. Patient is agreeable to call the office with any worsening of symptoms or onset of side effects, or if any concerns or questions arise.  The contact information for the office is made available to the patient. Patient is agreeable to call 911 or go to the nearest ER should they begin having any SI/HI, or if any urgent concerns arise. No medication side effects or related complaints today.       MEDS ORDERED DURING VISIT:  New Medications Ordered This Visit   Medications   • amphetamine-dextroamphetamine (Adderall) 10 MG tablet     Sig: Take 1/2-1 tablet PO every afternoon PRN     Dispense:  30 tablet     Refill:  0     Pt is also taking Adderall XR   • amphetamine-dextroamphetamine XR (Adderall XR) 15 MG 24 hr capsule     Sig: Take 1 capsule by mouth Every Morning for 30 days     Dispense:  30 capsule     Refill:  0       Return in about 4 weeks (around 8/25/2021), or if symptoms worsen or fail to improve, for Recheck.     Treatment plan completed on 2/17/21    Progress toward goal: Not at goal    Functional Status: Mild impairment     Prognosis: Good with Ongoing Treatment          This document has been electronically signed by MICHEL Guerrero  July 28, 2021 08:25 EDT    Please note that portions of this note were completed with a voice recognition program. Efforts were made to edit dictation, but occasionally words are mistranscribed.

## 2021-08-24 NOTE — PROGRESS NOTES
"This provider is located at the [facility] (through [facility]), [address], [facility], [address] using a secure [employer] Video Visit through [employer]. Patient is being seen remotely via telehealth at their home address in [place], and stated they are in a secure environment for this session. The patient's condition being diagnosed/treated is appropriate for telemedicine. The provider identified herself as well as her credentials.   The patient, and/or patients guardian, consent to be seen remotely, and when consent is given they understand that the consent allows for patient identifiable information to be sent to a third party as needed.   They may refuse to be seen remotely at any time. The electronic data is encrypted and password protected, and the patient and/or guardian has been advised of the potential risks to privacy not withstanding such measures.    You have chosen to receive care through a telehealth visit.  Do you consent to use a video/audio connection for your medical care today? Yes        Subjective   Honey Thomas is a 35 y.o. female who presents today for initial evaluation     Chief Complaint:  ADHD Symptoms    Accompanied by: Pt was alone for duration of appointment    History of Present Illness:   \"I had my annual checkup with my doctor. We went through some questions and she suggested I may have ADHD.\" Per pt, she is able to function and be successful, but it has been difficult and she has had to put in more of an effort than others. The patient endorses symptoms of ADHD including, but not limited to: careless mistakes or not paying attention to directions or people of authority, trouble keeping attention on tasks and during hobbies or leisure activities, does not listen when spoken to directly, does not follow instructions and fails to finish homework chores daily tasks or duties at work, avoids dislikes or doesn't want to do things that " error "require mental effort for a long period of time, loses things needed for tasks, easily distracted, forgetful in daily activities, often fidgets with hands or feet or squirms in seat, often is restless, often gets up from seat and moves around when remaining in seat is expected, is often on the go or often acts is if driven by a motor, often talks excessively, often blurts out answers before questions have been finished, often has trouble waiting one's turn and often interrupts or intrudes on others which have caused impairment in important areas of daily functioning. The patient has had symptoms of ADHD for years, but it only recently occurred to her that it could be ADHD and not anxiety related. 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 Pt did well in college, but was not able to sit down and study. Pt would prop note cards up and post them throughout the house while she completed other tasks or so she didn't have to sit for a long period of time. Pt feels that she always has to keep busy and moving. Pt also relies on alarms and reminders to complete tasks or attend appointments. 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 Pt works 6 days a week. She worked enough PRN shifts last year to make an additional $36k. Pt is forgetful and often loses items of importance. She bought an Apple watch because she would always misplace her phone, but now she has lost her Apple watch. Pt does \"intentional organization\" because she was losing things too frequently. Pt will find reasons to get up during activities that require staying seated (i.e. leave to use bathroom). Pt tends to finish people's sentences in her head and tries not to interrupt others, but it is a conscious effort that she is making so she isn't rude. Pt has trouble shutting her mind off at night and tries to " "tell her mind to focus on one thing when trying to sleep. Pt averages about 6 hours of sleep and it takes about 45 minutes for her to fall asleep. Pt will feel physically tired, but is unable to quickly fall asleep. Pt states that she is always going about \"100 miles an hour\". Pt had two difference surgeries at the same time and found herself painting cabinets at home because she couldn't sit still and felt they needed to be done. Pt has been struggling more this past year because of COVID-19; she is doing a lot of Zoom calls and has to stay seated staring at a computer screen. Pt feels she needs to be moving. She reports she struggles to stay on task. Pt doesn't like to delay tasks that need to be completed. She is not able to watch television and dreads watching a movie because she cannot sit still and her mind goes off in different directions. Pt states that she feels that she cannot simply enjoy spending time with her family because she always feels as if she needs to be doing something else and cannot keep her mind off it. The patient endorses some symptoms of anxiety including: restlessness or feeling keyed up, difficulty concentrating or mind going blank, irritability, muscle tension and sleep disturbance which have caused impairment in important daily functions. Pt has been treated with a few different medications, including Wellbutrin XL, but did not see any benefit.       Current Psychiatric Medications:  Buspar 5 mg PO BID - just started. Pt is ordered it TID, but is unable to remember it TID    Prior Psychiatric Medications:  Buspar: just started 5 mg PO BID; pt is struggling to remember  Zoloft: ineffective  Wellbutrin XL: ineffective  Paxil: ineffective    Currently in Counseling or Therapy:  Denies    Prior Psychiatric Outpatient Care:  Denies    Prior Psychiatric Hospitalizations:  Denies    Previous Suicide Attempts:  Denies    Previous Self-Harming Behavior:  Denies    Any family history of suicide " attempts:  Denies    Legal History, Arrests, or Incarcerations:  No current legal charges pending.  Denies    Violent Tendencies:  Denies    Developmental History:  The patient reports they were the result of a post-due date pregnancy.  The patient reports they met all of their developmental milestones as expected.  The patient denies knowledge of the biological mother using alcohol or illicit/recreational substances during the pregnancy with the patient.    History of Seizures or TBI:  Denies    Highest Level of Education:  Pt has her Master's of Science Degree in Occupational Therapy    Employment:  Works full-time at [employer]   Works PRN at [employer]   Pt loves her job until she had to do it virtually     History:  Denies    Social History:  Born: [place]  Marriage status:  x2,  x1. Pt has been with her current  for about 6 years. Pt states it is a stable relationship  Children: Three children: 15 YO daughter from first marriage. She also has a 3 YO and 1 YO  Lives with: The patient's currently household consists of the patient,  and children. They are currently building a home where there will have a mother-in-law suite    Abuse History:  Verbal: Denies  Emotional: Denies  Mental: Denies  Physical: Denies  Sexual: Denies  Rape: Denies  Other: Denies    Patient's Support Network Includes:  significant other, mother and sister, friends        The following portions of the patient's history were reviewed and updated as appropriate: allergies, current medications, past family history, past medical history, past social history, past surgical history and problem list.          Past Medical History:  Past Medical History:   Diagnosis Date   • Anxiety    • BPPV (benign paroxysmal positional vertigo)    • Carpal tunnel syndrome 2010    bilat   • TMJ (dislocation of temporomandibular joint) 2/4/2021   • Vitamin D deficiency        Social History:  Social History      Socioeconomic History   • Marital status:      Spouse name: Not on file   • Number of children: Not on file   • Years of education: Not on file   • Highest education level: Not on file   Occupational History   • Occupation: occupational therapy   Tobacco Use   • Smoking status: Never Smoker   • Smokeless tobacco: Never Used   Substance and Sexual Activity   • Alcohol use: Yes     Comment: social   • Drug use: No   • Sexual activity: Yes     Partners: Male     Birth control/protection: Surgical       Family History:  Family History   Problem Relation Age of Onset   • Hypertension Father    • Diabetes Father    • Hypertension Mother    • Ovarian cancer Mother 37   • Graves' disease Sister    • Scoliosis Sister    • Sleep apnea Sister    • Lung cancer Maternal Grandmother    • Alzheimer's disease Paternal Grandmother    • Other Paternal Grandfather         accident   • Autism Niece        Past Surgical History:  Past Surgical History:   Procedure Laterality Date   • ABDOMINOPLASTY N/A 2019    Procedure: ABDOMINOPLASTY;  Surgeon: Alphonso Vasquez MD;  Location:  [facility];  Service: Plastics   • BREAST AUGMENTATION Bilateral 2019    Procedure: BREAST AUGMENTATION WITH IMPLANTS BILATERAL;  Surgeon: Alphonso Vasquez MD;  Location:  [facility];  Service: Plastics   • FRACTURE SURGERY Left 1995    lEFT FOREARM   • TOTAL LAPAROSCOPIC HYSTERECTOMY SALPINGO OOPHORECTOMY N/A 2019    Procedure: TOTAL ROBOTIC HYSTERECTOMY WITH BILATERAL SALPINGO-OOPHORECTOMY;  Surgeon: Summer Dorsey DO;  Location:  [facility];  Service: DaVinci   • WISDOM TOOTH EXTRACTION         Problem List:  Patient Active Problem List   Diagnosis   •  (normal spontaneous vaginal delivery)   • Vitamin D deficiency   • Anxiety   • BPPV (benign paroxysmal positional vertigo)   • Carpal tunnel syndrome   • TMJ (dislocation of temporomandibular joint)       Allergy:   No Known Allergies     Current Medications:   Current  Outpatient Medications   Medication Sig Dispense Refill   • busPIRone (BUSPAR) 5 MG tablet 1 daily for 1 week, then 1 bid x1 week, then 1 tid (Patient taking differently: Take 5 mg by mouth. Taking 1 tablet PO BID) 90 tablet 2   • estradiol (ESTRACE) 1 MG tablet Take 1 tablet by mouth Daily.     • atomoxetine (Strattera) 40 MG capsule Take 1 capsule by mouth Daily for 30 days. 30 capsule 0     No current facility-administered medications for this visit.        Review of Symptoms:    Review of Systems   Psychiatric/Behavioral: Positive for decreased concentration and sleep disturbance.         Physical Exam:   Due to the remote nature of this encounter (virtual encounter), vitals were unable to be obtained.  Height stated at 66 inches.  Weight stated at 191 pounds.         Physical Exam  Neurological:      Mental Status: She is alert.   Psychiatric:         Attention and Perception: Perception normal.         Mood and Affect: Mood and affect normal.         Speech: Speech normal.         Behavior: Behavior normal. Behavior is cooperative.         Thought Content: Thought content normal. Thought content is not paranoid or delusional. Thought content does not include homicidal or suicidal ideation. Thought content does not include homicidal or suicidal plan.         Cognition and Memory: Cognition and memory normal.         Judgment: Judgment is impulsive.      Comments: Easily distracted at times. Overtalkative.           Mental Status Exam:   Hygiene:   good  Cooperation:  Cooperative  Eye Contact:  Good  Psychomotor Behavior:  Restless  Affect:  Full range  Mood: normal  Hopelessness: Denies  Speech:  Overtalkative  Thought Process:  Linear  Thought Content:  Normal  Suicidal:  None  Homicidal:  None  Hallucinations:  None  Delusion:  None  Memory:  Intact  Orientation:  Person, Place, Time and Situation  Reliability:  good  Insight:  Good  Judgement:  Good  Impulse Control:  Fair  Physical/Medical Issues:  No         PHQ-9 Depression Screening  Little interest or pleasure in doing things? 0   Feeling down, depressed, or hopeless? 0   Trouble falling or staying asleep, or sleeping too much? 3   Feeling tired or having little energy? 0   Poor appetite or overeating? 0   Feeling bad about yourself - or that you are a failure or have let yourself or your family down? 0   Trouble concentrating on things, such as reading the newspaper or watching television? 3   Moving or speaking so slowly that other people could have noticed? Or the opposite - being so fidgety or restless that you have been moving around a lot more than usual? 3   Thoughts that you would be better off dead, or of hurting yourself in some way? 0   PHQ-9 Total Score 9   If you checked off any problems, how difficult have these problems made it for you to do your work, take care of things at home, or get along with other people? Somewhat difficult     PHQ-9 Total Score: 9        DA 7 anxiety screening tool that patient filled out virtually reviewed by this APRN at today's encounter.    PROMIS scale screening tool that patient filled out virtually reviewed by this APRN at today's encounter.    [facility] request number [id number] reviewed by this APRN at today's encounter.    Previous Provider notes and available records reviewed by this APRN at today's encounter.     Patient screened positive for depression based on a PHQ-9 score of 9 on 2/17/2021. Follow-up recommendations include: Pt denies depression.        Lab Results:   Office Visit on 02/04/2021   Component Date Value Ref Range Status   • Glucose 02/04/2021 76  65 - 99 mg/dL Final   • BUN 02/04/2021 16  6 - 20 mg/dL Final   • Creatinine 02/04/2021 0.67  0.57 - 1.00 mg/dL Final   • Sodium 02/04/2021 141  136 - 145 mmol/L Final   • Potassium 02/04/2021 4.4  3.5 - 5.2 mmol/L Final   • Chloride 02/04/2021 105  98 - 107 mmol/L Final   • CO2 02/04/2021 25.9  22.0 - 29.0 mmol/L Final   • Calcium 02/04/2021 9.4  8.6 -  10.5 mg/dL Final   • Total Protein 02/04/2021 6.5  6.0 - 8.5 g/dL Final   • Albumin 02/04/2021 4.50  3.50 - 5.20 g/dL Final   • ALT (SGPT) 02/04/2021 19  1 - 33 U/L Final   • AST (SGOT) 02/04/2021 20  1 - 32 U/L Final   • Alkaline Phosphatase 02/04/2021 86  39 - 117 U/L Final   • Total Bilirubin 02/04/2021 0.2  0.0 - 1.2 mg/dL Final   • eGFR Non African Amer 02/04/2021 99  >60 mL/min/1.73 Final   • Globulin 02/04/2021 2.0  gm/dL Final   • A/G Ratio 02/04/2021 2.3  g/dL Final   • BUN/Creatinine Ratio 02/04/2021 23.9  7.0 - 25.0 Final   • Anion Gap 02/04/2021 10.1  5.0 - 15.0 mmol/L Final   • Hemoglobin A1C 02/04/2021 5.40  4.80 - 5.60 % Final   • Total Cholesterol 02/04/2021 172  0 - 200 mg/dL Final   • Triglycerides 02/04/2021 66  0 - 150 mg/dL Final   • HDL Cholesterol 02/04/2021 56  40 - 60 mg/dL Final   • LDL Cholesterol  02/04/2021 103* 0 - 100 mg/dL Final   • VLDL Cholesterol 02/04/2021 13  5 - 40 mg/dL Final   • LDL/HDL Ratio 02/04/2021 1.84   Final   • Free T4 02/04/2021 1.29  0.93 - 1.70 ng/dL Final   • TSH 02/04/2021 0.597  0.270 - 4.200 uIU/mL Final   • 25 Hydroxy, Vitamin D 02/04/2021 29.7* 30.0 - 100.0 ng/ml Final   • Vitamin B-12 02/04/2021 480  211 - 946 pg/mL Final   • WBC 02/04/2021 6.68  3.40 - 10.80 10*3/mm3 Final   • RBC 02/04/2021 4.53  3.77 - 5.28 10*6/mm3 Final   • Hemoglobin 02/04/2021 14.0  12.0 - 15.9 g/dL Final   • Hematocrit 02/04/2021 41.2  34.0 - 46.6 % Final   • MCV 02/04/2021 90.9  79.0 - 97.0 fL Final   • MCH 02/04/2021 30.9  26.6 - 33.0 pg Final   • MCHC 02/04/2021 34.0  31.5 - 35.7 g/dL Final   • RDW 02/04/2021 12.2* 12.3 - 15.4 % Final   • RDW-SD 02/04/2021 40.5  37.0 - 54.0 fl Final   • MPV 02/04/2021 10.9  6.0 - 12.0 fL Final   • Platelets 02/04/2021 252  140 - 450 10*3/mm3 Final   • Neutrophil % 02/04/2021 63.8  42.7 - 76.0 % Final   • Lymphocyte % 02/04/2021 23.4  19.6 - 45.3 % Final   • Monocyte % 02/04/2021 9.1  5.0 - 12.0 % Final   • Eosinophil % 02/04/2021 2.1  0.3 - 6.2 %  Final   • Basophil % 02/04/2021 1.3  0.0 - 1.5 % Final   • Immature Grans % 02/04/2021 0.3  0.0 - 0.5 % Final   • Neutrophils, Absolute 02/04/2021 4.26  1.70 - 7.00 10*3/mm3 Final   • Lymphocytes, Absolute 02/04/2021 1.56  0.70 - 3.10 10*3/mm3 Final   • Monocytes, Absolute 02/04/2021 0.61  0.10 - 0.90 10*3/mm3 Final   • Eosinophils, Absolute 02/04/2021 0.14  0.00 - 0.40 10*3/mm3 Final   • Basophils, Absolute 02/04/2021 0.09  0.00 - 0.20 10*3/mm3 Final   • Immature Grans, Absolute 02/04/2021 0.02  0.00 - 0.05 10*3/mm3 Final   • nRBC 02/04/2021 0.0  0.0 - 0.2 /100 WBC Final         Assessment/Plan   Problems Addressed this Visit     None      Visit Diagnoses     Attention deficit hyperactivity disorder, combined type  (Chronic)   -  Primary    Relevant Medications    atomoxetine (Strattera) 40 MG capsule    Anxiety disorder, unspecified type  (Chronic)       Relevant Medications    atomoxetine (Strattera) 40 MG capsule    Sleep difficulties  (Chronic)         Diagnoses       Codes Comments    Attention deficit hyperactivity disorder, combined type    -  Primary ICD-10-CM: F90.2  ICD-9-CM: 314.01     Anxiety disorder, unspecified type     ICD-10-CM: F41.9  ICD-9-CM: 300.00     Sleep difficulties     ICD-10-CM: G47.9  ICD-9-CM: 780.50           Visit Diagnoses:    ICD-10-CM ICD-9-CM   1. Attention deficit hyperactivity disorder, combined type  F90.2 314.01   2. Anxiety disorder, unspecified type  F41.9 300.00   3. Sleep difficulties  G47.9 780.50          GOALS:  Short Term Goals: Patient will be compliant with medication, and patient will have no significant medication related side effects.  Patient will be engaged in psychotherapy as indicated.  Patient will report subjective improvement of symptoms.  Long term goals: To stabilize mood and treat/improve subjective symptoms, the patient will stay out of the hospital, the patient will be at an optimal level of functioning, and the patient will take all medications as  prescribed.  The patient verbalized understanding and agreement with goals that were mutually set.      TREATMENT PLAN: Continue supportive psychotherapy efforts and medications as indicated. Start Strattera 40 mg PO QAM for ADHD. Continue Buspar 5 mg PO BID for anxiety from PCP. Medication and treatment options, both pharmacological and non-pharmacological treatment options, discussed during today's visit, including any off label use of medication. Patient acknowledged and verbally consented with current treatment plan and was educated on the importance of compliance with treatment and follow-up appointments.        MEDICATION ISSUES:    Discussed treatment plan and medication options of prescribed medication as well as the risks, benefits, any black box warnings, and side effects including potential falls, possible impaired driving, and metabolic adversities among others, including any off label use of medication. Patient is agreeable to call the office with any worsening of symptoms or onset of side effects, or if any concerns or questions arise.  The contact information for the office is made available to the patient. Patient is agreeable to call 911 or go to the nearest ER should they begin having any SI/HI, or if any urgent concerns arise. No medication side effects or related complaints today.       MEDS ORDERED DURING VISIT:  New Medications Ordered This Visit   Medications   • atomoxetine (Strattera) 40 MG capsule     Sig: Take 1 capsule by mouth Daily for 30 days.     Dispense:  30 capsule     Refill:  0       Return in about 27 days (around 3/16/2021), or if symptoms worsen or fail to improve, for Recheck.     Treatment plan completed on 2/17/21    Progress toward goal: Not at goal    Functional Status: Moderate impairment     Prognosis: Good with Ongoing Treatment         This document has been electronically signed by MICHEL Guerrero  February 17, 2021 12:52 EST    Please note that portions of this  note were completed with a voice recognition program. Efforts were made to edit dictation, but occasionally words are mistranscribed.

## 2021-08-25 ENCOUNTER — TELEMEDICINE (OUTPATIENT)
Dept: PSYCHIATRY | Facility: CLINIC | Age: 36
End: 2021-08-25

## 2021-08-25 DIAGNOSIS — F90.2 ATTENTION DEFICIT HYPERACTIVITY DISORDER, COMBINED TYPE: Primary | Chronic | ICD-10-CM

## 2021-08-25 DIAGNOSIS — F41.9 ANXIETY DISORDER, UNSPECIFIED TYPE: Chronic | ICD-10-CM

## 2021-08-25 PROCEDURE — 99214 OFFICE O/P EST MOD 30 MIN: CPT | Performed by: NURSE PRACTITIONER

## 2021-08-25 RX ORDER — DEXTROAMPHETAMINE SACCHARATE, AMPHETAMINE ASPARTATE, DEXTROAMPHETAMINE SULFATE AND AMPHETAMINE SULFATE 2.5; 2.5; 2.5; 2.5 MG/1; MG/1; MG/1; MG/1
TABLET ORAL
Qty: 30 TABLET | Refills: 0 | Status: SHIPPED | OUTPATIENT
Start: 2021-08-25 | End: 2021-09-23 | Stop reason: SDUPTHER

## 2021-08-25 RX ORDER — DEXTROAMPHETAMINE SACCHARATE, AMPHETAMINE ASPARTATE MONOHYDRATE, DEXTROAMPHETAMINE SULFATE AND AMPHETAMINE SULFATE 3.75; 3.75; 3.75; 3.75 MG/1; MG/1; MG/1; MG/1
15 CAPSULE, EXTENDED RELEASE ORAL EVERY MORNING
Qty: 30 CAPSULE | Refills: 0 | Status: SHIPPED | OUTPATIENT
Start: 2021-08-25 | End: 2021-09-23 | Stop reason: SDUPTHER

## 2021-08-25 NOTE — PROGRESS NOTES
This provider is located at the [facility] (through [facility]), [address], [place], [address] using a secure [employer]Ithart Video Visit through [employer]. Patient is being seen remotely via telehealth at their home address in [place], and stated they are in a secure environment for this session. The patient's condition being diagnosed/treated is appropriate for telemedicine. The provider identified herself as well as her credentials.   The patient, and/or patients guardian, consent to be seen remotely, and when consent is given they understand that the consent allows for patient identifiable information to be sent to a third party as needed.   They may refuse to be seen remotely at any time. The electronic data is encrypted and password protected, and the patient and/or guardian has been advised of the potential risks to privacy not withstanding such measures.    You have chosen to receive care through a telehealth visit.  Do you consent to use a video/audio connection for your medical care today? Yes        Subjective   Honey Thomas is a 36 y.o. 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 One of the therapists are on medical leave so pt is being spread thin. The Adderall XR and IR continue to work well. Pt doesn't take them every day, she skips some times. Mood is stable as is sleep and appetite. The patient denies any new medical problems or changes in medications since last appointment with this facility. The patient reports compliance with current medication regimen. The patient denies any current side effects from her current medication regimen. The patient denies any  abnormal muscle movements or tics. The patient would like to not adjust or change their medications at this visit. The patient denies any suicidal or homicidal ideations, plans, or intent at today's encounter and is convincing.  The patient denies any auditory hallucinations or visual hallucinations. The patient does not endorse any significant symptoms consistent with lulu or psychosis at today's encounter.        Prior Psychiatric Medications:  Buspar: pt struggled to remember to take multiple times a day  Zoloft: ineffective  Wellbutrin XL: ineffective  Paxil: ineffective  Strattera: nausea, irritability        The following portions of the patient's history were reviewed and updated as appropriate: allergies, current medications, past family history, past medical history, past social history, past surgical history and problem list.          Past Medical History:  Past Medical History:   Diagnosis Date   • Anxiety    • BPPV (benign paroxysmal positional vertigo)    • Carpal tunnel syndrome 2010    bilat   • TMJ (dislocation of temporomandibular joint) 2/4/2021   • Vitamin D deficiency        Social History:  Social History     Socioeconomic History   • Marital status:      Spouse name: Not on file   • Number of children: Not on file   • Years of education: Not on file   • Highest education level: Not on file   Tobacco Use   • Smoking status: Never Smoker   • Smokeless tobacco: Never Used   Substance and Sexual Activity   • Alcohol use: Yes     Comment: social   • Drug use: No   • Sexual activity: Yes     Partners: Male     Birth control/protection: Surgical       Family History:  Family History   Problem Relation Age of Onset   • Hypertension Father    • Diabetes Father    • Hypertension Mother    • Ovarian cancer Mother 37   • Graves' disease Sister    • Scoliosis Sister    • Sleep apnea Sister    • Lung cancer Maternal Grandmother    • Alzheimer's disease Paternal Grandmother    • Other Paternal  Grandfather         accident   • Autism Niece        Past Surgical History:  Past Surgical History:   Procedure Laterality Date   • ABDOMINOPLASTY N/A 2019    Procedure: ABDOMINOPLASTY;  Surgeon: Alphonso Vasquez MD;  Location:  [facility];  Service: Plastics   • BREAST AUGMENTATION Bilateral 2019    Procedure: BREAST AUGMENTATION WITH IMPLANTS BILATERAL;  Surgeon: Alphonso Vasquez MD;  Location:  [facility];  Service: Plastics   • FRACTURE SURGERY Left 1995    lEFT FOREARM   • TOTAL LAPAROSCOPIC HYSTERECTOMY SALPINGO OOPHORECTOMY N/A 2019    Procedure: TOTAL ROBOTIC HYSTERECTOMY WITH BILATERAL SALPINGO-OOPHORECTOMY;  Surgeon: Summer Dorsey DO;  Location:  [facility];  Service: DaVinci   • WISDOM TOOTH EXTRACTION         Problem List:  Patient Active Problem List   Diagnosis   •  (normal spontaneous vaginal delivery)   • Vitamin D deficiency   • Anxiety   • BPPV (benign paroxysmal positional vertigo)   • Carpal tunnel syndrome   • TMJ (dislocation of temporomandibular joint)       Allergy:   No Known Allergies     Current Medications:   Current Outpatient Medications   Medication Sig Dispense Refill   • amphetamine-dextroamphetamine (Adderall) 10 MG tablet Take 1/2-1 tablet PO every afternoon PRN 30 tablet 0   • amphetamine-dextroamphetamine XR (Adderall XR) 15 MG 24 hr capsule Take 1 capsule by mouth Every Morning for 30 days 30 capsule 0   • cyclobenzaprine (FLEXERIL) 10 MG tablet Take 1 tablet by mouth 3 (Three) Times a Day As Needed for Muscle Spasms. 30 tablet 1   • estradiol (ESTRACE) 1 MG tablet TAKE 1 TABLET BY MOUTH EVERY DAY 14 tablet 0   • hydrOXYzine (ATARAX) 25 MG tablet Take 1-2 tablets PO TID PRN for anxiety/sleep 180 tablet 0     No current facility-administered medications for this visit.       Review of Symptoms:    Review of Systems   Constitutional: Negative.    Psychiatric/Behavioral: Positive for stress.         Physical Exam:   Due to the remote nature of this  encounter (virtual encounter), vitals were unable to be obtained.  Height stated at 66 inches.  Weight stated at 184 pounds.         Physical Exam  Neurological:      Mental Status: She is alert.   Psychiatric:         Attention and Perception: Attention and perception normal. She does not perceive auditory or visual hallucinations.         Mood and Affect: Mood and affect normal.         Speech: Speech normal.         Behavior: Behavior normal. Behavior is cooperative.         Thought Content: Thought content normal. Thought content is not paranoid or delusional. Thought content does not include homicidal or suicidal ideation. Thought content does not include homicidal or suicidal plan.         Cognition and Memory: Cognition and memory normal.         Judgment: Judgment normal.           Mental Status Exam:   Hygiene:   good  Cooperation:  Cooperative  Eye Contact:  Good  Psychomotor Behavior:  Appropriate  Affect:  Appropriate  Mood: normal  Speech:  Normal  Thought Process:  Goal directed  Thought Content:  Normal  Suicidal:  None  Homicidal:  None  Hallucinations:  None  Delusion:  None  Memory:  Intact  Orientation:  Person, Place, Time and Situation  Reliability:  good  Insight:  Good  Judgement:  Good  Impulse Control:  Good  Physical/Medical Issues:  No          Previous Provider notes and available records reviewed by this APRN at today's encounter.         Lab Results:   No visits with results within 1 Month(s) from this visit.   Latest known visit with results is:   Telemedicine on 03/16/2021   Component Date Value Ref Range Status   • THC, Screen, Urine 03/16/2021 Negative  Negative Final   • Phencyclidine (PCP), Urine 03/16/2021 Negative  Negative Final   • Cocaine Screen, Urine 03/16/2021 Negative  Negative Final   • Methamphetamine, Ur 03/16/2021 Negative  Negative Final   • Opiate Screen 03/16/2021 Negative  Negative Final   • Amphetamine Screen, Urine 03/16/2021 Negative  Negative Final   •  Benzodiazepine Screen, Urine 03/16/2021 Negative  Negative Final   • Tricyclic Antidepressants Screen 03/16/2021 Negative  Negative Final   • Methadone Screen, Urine 03/16/2021 Negative  Negative Final   • Barbiturates Screen, Urine 03/16/2021 Negative  Negative Final   • Oxycodone Screen, Urine 03/16/2021 Negative  Negative Final   • Propoxyphene Screen 03/16/2021 Negative  Negative Final   • Buprenorphine, Screen, Urine 03/16/2021 Negative  Negative Final         Assessment/Plan   Problems Addressed this Visit     None      Visit Diagnoses     Attention deficit hyperactivity disorder, combined type  (Chronic)   -  Primary    Anxiety disorder, unspecified type  (Chronic)         Diagnoses       Codes Comments    Attention deficit hyperactivity disorder, combined type    -  Primary ICD-10-CM: F90.2  ICD-9-CM: 314.01     Anxiety disorder, unspecified type     ICD-10-CM: F41.9  ICD-9-CM: 300.00           Visit Diagnoses:    ICD-10-CM ICD-9-CM   1. Attention deficit hyperactivity disorder, combined type  F90.2 314.01   2. Anxiety disorder, unspecified type  F41.9 300.00          GOALS:  Short Term Goals: Patient will be compliant with medication, and patient will have no significant medication related side effects.  Patient will be engaged in psychotherapy as indicated.  Patient will report subjective improvement of symptoms.  Long term goals: To stabilize mood and treat/improve subjective symptoms, the patient will stay out of the hospital, the patient will be at an optimal level of functioning, and the patient will take all medications as prescribed.  The patient verbalized understanding and agreement with goals that were mutually set.      TREATMENT PLAN:   -Continue Adderall XR 15 mg PO QAM for ADHD  -Continue Adderall 5-10 mg PO every afternoon PRN for ADHD  -Continue hydroxyzine 25-50 mg PO TID PRN for anxiety    Medication and treatment options, both pharmacological and non-pharmacological treatment options,  discussed during today's visit, including any off label use of medication. Patient acknowledged and verbally consented with current treatment plan and was educated on the importance of compliance with treatment and follow-up appointments.        MEDICATION ISSUES:    Discussed treatment plan and medication options of prescribed medication as well as the risks, benefits, any black box warnings, and side effects including potential falls, possible impaired driving, and metabolic adversities among others, including any off label use of medication. Patient is agreeable to call the office with any worsening of symptoms or onset of side effects, or if any concerns or questions arise.  The contact information for the office is made available to the patient. Patient is agreeable to call 911 or go to the nearest ER should they begin having any SI/HI, or if any urgent concerns arise. No medication side effects or related complaints today.       MEDS ORDERED DURING VISIT:  No orders of the defined types were placed in this encounter.      Return in about 4 weeks (around 9/22/2021), or if symptoms worsen or fail to improve, for Recheck.     Treatment plan completed on 2/17/21    Progress toward goal: Not at goal    Functional Status: Mild impairment     Prognosis: Good with Ongoing Treatment         This document has been electronically signed by MICHEL Guerrero  August 25, 2021 15:27 EDT    Please note that portions of this note were completed with a voice recognition program. Efforts were made to edit dictation, but occasionally words are mistranscribed.

## 2021-09-23 ENCOUNTER — TELEMEDICINE (OUTPATIENT)
Dept: PSYCHIATRY | Facility: CLINIC | Age: 36
End: 2021-09-23

## 2021-09-23 DIAGNOSIS — F41.9 ANXIETY DISORDER, UNSPECIFIED TYPE: Chronic | ICD-10-CM

## 2021-09-23 DIAGNOSIS — F90.2 ATTENTION DEFICIT HYPERACTIVITY DISORDER, COMBINED TYPE: Primary | Chronic | ICD-10-CM

## 2021-09-23 PROCEDURE — 99214 OFFICE O/P EST MOD 30 MIN: CPT | Performed by: NURSE PRACTITIONER

## 2021-09-23 RX ORDER — DEXTROAMPHETAMINE SACCHARATE, AMPHETAMINE ASPARTATE MONOHYDRATE, DEXTROAMPHETAMINE SULFATE AND AMPHETAMINE SULFATE 3.75; 3.75; 3.75; 3.75 MG/1; MG/1; MG/1; MG/1
15 CAPSULE, EXTENDED RELEASE ORAL EVERY MORNING
Qty: 30 CAPSULE | Refills: 0 | Status: SHIPPED | OUTPATIENT
Start: 2021-09-23 | End: 2021-10-20 | Stop reason: SDUPTHER

## 2021-09-23 RX ORDER — DEXTROAMPHETAMINE SACCHARATE, AMPHETAMINE ASPARTATE, DEXTROAMPHETAMINE SULFATE AND AMPHETAMINE SULFATE 2.5; 2.5; 2.5; 2.5 MG/1; MG/1; MG/1; MG/1
TABLET ORAL
Qty: 30 TABLET | Refills: 0 | Status: SHIPPED | OUTPATIENT
Start: 2021-09-23 | End: 2021-10-20 | Stop reason: SDUPTHER

## 2021-09-23 NOTE — PROGRESS NOTES
This provider is located at the [facility] (through [facility]), [address], [place], [address] using a secure [employer] Video Visit through [employer]. Patient is being seen remotely via telehealth at their home address in [place], and stated they are in a secure environment for this session. The patient's condition being diagnosed/treated is appropriate for telemedicine. The provider identified herself as well as her credentials.   The patient, and/or patients guardian, consent to be seen remotely, and when consent is given they understand that the consent allows for patient identifiable information to be sent to a third party as needed.   They may refuse to be seen remotely at any time. The electronic data is encrypted and password protected, and the patient and/or guardian has been advised of the potential risks to privacy not withstanding such measures.    You have chosen to receive care through a telehealth visit.  Do you consent to use a video/audio connection for your medical care today? Yes        Subjective   Honey Thomas is a 36 y.o. female who presents today for follow up    Chief Complaint:  ADHD and anxiety    Accompanied by: Pt was alone for duration of appointment    History of Present Illness:   Pt states she is doing well overall, but is currently frustrated because no progress has been made on their house. Pt is not sure why and they are supposed to move in January. Pt now has to ask their  if they can stay beyond their lease. 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 Pt states it has been very stressful and overwhelming. She feels that she is able to stay on track due to the Adderall. Pt continues to benefit from the use of a stimulant. Sleep and appetite are stable. The patient denies any new medical problems or changes in medications since last appointment with this facility. The patient reports compliance  with current medication regimen. The patient denies any current side effects from her current medication regimen. The patient denies any abnormal muscle movements or tics. The patient would like to not adjust or change their medications at this visit. The patient denies any suicidal or homicidal ideations, plans, or intent at today's encounter and is convincing.  The patient denies any auditory hallucinations or visual hallucinations. The patient does not endorse any significant symptoms consistent with lulu or psychosis at today's encounter.          Prior Psychiatric Medications:  Buspar: pt struggled to remember to take multiple times a day  Zoloft: ineffective  Wellbutrin XL: ineffective  Paxil: ineffective  Strattera: nausea, irritability        The following portions of the patient's history were reviewed and updated as appropriate: allergies, current medications, past family history, past medical history, past social history, past surgical history and problem list.          Past Medical History:  Past Medical History:   Diagnosis Date   • Anxiety    • BPPV (benign paroxysmal positional vertigo)    • Carpal tunnel syndrome 2010    bilat   • TMJ (dislocation of temporomandibular joint) 2/4/2021   • Vitamin D deficiency        Social History:  Social History     Socioeconomic History   • Marital status:      Spouse name: Not on file   • Number of children: Not on file   • Years of education: Not on file   • Highest education level: Not on file   Tobacco Use   • Smoking status: Never Smoker   • Smokeless tobacco: Never Used   Substance and Sexual Activity   • Alcohol use: Yes     Comment: social   • Drug use: No   • Sexual activity: Yes     Partners: Male     Birth control/protection: Surgical       Family History:  Family History   Problem Relation Age of Onset   • Hypertension Father    • Diabetes Father    • Hypertension Mother    • Ovarian cancer Mother 37   • Graves' disease Sister    • Scoliosis  Sister    • Sleep apnea Sister    • Lung cancer Maternal Grandmother    • Alzheimer's disease Paternal Grandmother    • Other Paternal Grandfather         accident   • Autism Niece        Past Surgical History:  Past Surgical History:   Procedure Laterality Date   • ABDOMINOPLASTY N/A 2019    Procedure: ABDOMINOPLASTY;  Surgeon: Alphonso Vasquez MD;  Location:  [facility];  Service: Plastics   • BREAST AUGMENTATION Bilateral 2019    Procedure: BREAST AUGMENTATION WITH IMPLANTS BILATERAL;  Surgeon: Alphonso Vasquez MD;  Location:  [facility];  Service: Plastics   • FRACTURE SURGERY Left 1995    lEFT FOREARM   • TOTAL LAPAROSCOPIC HYSTERECTOMY SALPINGO OOPHORECTOMY N/A 2019    Procedure: TOTAL ROBOTIC HYSTERECTOMY WITH BILATERAL SALPINGO-OOPHORECTOMY;  Surgeon: Summer Dorsey DO;  Location:  [facility];  Service: DaVinci   • WISDOM TOOTH EXTRACTION         Problem List:  Patient Active Problem List   Diagnosis   •  (normal spontaneous vaginal delivery)   • Vitamin D deficiency   • Anxiety   • BPPV (benign paroxysmal positional vertigo)   • Carpal tunnel syndrome   • TMJ (dislocation of temporomandibular joint)       Allergy:   No Known Allergies     Current Medications:   Current Outpatient Medications   Medication Sig Dispense Refill   • amphetamine-dextroamphetamine (Adderall) 10 MG tablet Take 1/2-1 tablet PO every afternoon PRN 30 tablet 0   • amphetamine-dextroamphetamine XR (Adderall XR) 15 MG 24 hr capsule Take 1 capsule by mouth Every Morning for 30 days 30 capsule 0   • cyclobenzaprine (FLEXERIL) 10 MG tablet Take 1 tablet by mouth 3 (Three) Times a Day As Needed for Muscle Spasms. 30 tablet 1   • estradiol (ESTRACE) 1 MG tablet TAKE 1 TABLET BY MOUTH EVERY DAY 14 tablet 0   • hydrOXYzine (ATARAX) 25 MG tablet Take 1-2 tablets PO TID PRN for anxiety/sleep 180 tablet 0     No current facility-administered medications for this visit.       Review of Symptoms:    Review of Systems    Constitutional: Negative.    Psychiatric/Behavioral: Positive for stress.         Physical Exam:   Due to the remote nature of this encounter (virtual encounter), vitals were unable to be obtained.  Height stated at 66 inches.  Weight stated at 184 pounds.         Physical Exam  Neurological:      Mental Status: She is alert.   Psychiatric:         Attention and Perception: Attention and perception normal.         Mood and Affect: Mood and affect normal.         Speech: Speech normal.         Behavior: Behavior normal. Behavior is cooperative.         Thought Content: Thought content normal. Thought content is not paranoid or delusional. Thought content does not include homicidal or suicidal ideation. Thought content does not include homicidal or suicidal plan.         Cognition and Memory: Cognition and memory normal.         Judgment: Judgment normal.           Mental Status Exam:   Hygiene:   good  Cooperation:  Cooperative  Eye Contact:  Good  Psychomotor Behavior:  Appropriate  Affect:  Appropriate  Mood: normal  Speech:  Normal  Thought Process:  Linear  Thought Content:  Normal  Suicidal:  None  Homicidal:  None  Hallucinations:  None  Delusion:  None  Memory:  Intact  Orientation:  Person, Place, Time and Situation  Reliability:  good  Insight:  Good  Judgement:  Good  Impulse Control:  Good  Physical/Medical Issues:  No        [facility] request number [id number] reviewed by this APRN at today's encounter.    Previous Provider notes and available records reviewed by this APRN at today's encounter.         Lab Results:   No visits with results within 1 Month(s) from this visit.   Latest known visit with results is:   Telemedicine on 03/16/2021   Component Date Value Ref Range Status   • THC, Screen, Urine 03/16/2021 Negative  Negative Final   • Phencyclidine (PCP), Urine 03/16/2021 Negative  Negative Final   • Cocaine Screen, Urine 03/16/2021 Negative  Negative Final   • Methamphetamine, Ur 03/16/2021 Negative   Negative Final   • Opiate Screen 03/16/2021 Negative  Negative Final   • Amphetamine Screen, Urine 03/16/2021 Negative  Negative Final   • Benzodiazepine Screen, Urine 03/16/2021 Negative  Negative Final   • Tricyclic Antidepressants Screen 03/16/2021 Negative  Negative Final   • Methadone Screen, Urine 03/16/2021 Negative  Negative Final   • Barbiturates Screen, Urine 03/16/2021 Negative  Negative Final   • Oxycodone Screen, Urine 03/16/2021 Negative  Negative Final   • Propoxyphene Screen 03/16/2021 Negative  Negative Final   • Buprenorphine, Screen, Urine 03/16/2021 Negative  Negative Final         Assessment/Plan   Problems Addressed this Visit     None      Visit Diagnoses     Attention deficit hyperactivity disorder, combined type  (Chronic)   -  Primary    Relevant Medications    amphetamine-dextroamphetamine XR (Adderall XR) 15 MG 24 hr capsule    amphetamine-dextroamphetamine (Adderall) 10 MG tablet    Anxiety disorder, unspecified type  (Chronic)       Relevant Medications    amphetamine-dextroamphetamine XR (Adderall XR) 15 MG 24 hr capsule    amphetamine-dextroamphetamine (Adderall) 10 MG tablet      Diagnoses       Codes Comments    Attention deficit hyperactivity disorder, combined type    -  Primary ICD-10-CM: F90.2  ICD-9-CM: 314.01     Anxiety disorder, unspecified type     ICD-10-CM: F41.9  ICD-9-CM: 300.00           Visit Diagnoses:    ICD-10-CM ICD-9-CM   1. Attention deficit hyperactivity disorder, combined type  F90.2 314.01   2. Anxiety disorder, unspecified type  F41.9 300.00          GOALS:  Short Term Goals: Patient will be compliant with medication, and patient will have no significant medication related side effects.  Patient will be engaged in psychotherapy as indicated.  Patient will report subjective improvement of symptoms.  Long term goals: To stabilize mood and treat/improve subjective symptoms, the patient will stay out of the hospital, the patient will be at an optimal level of  functioning, and the patient will take all medications as prescribed.  The patient verbalized understanding and agreement with goals that were mutually set.      TREATMENT PLAN:   -Continue Adderall XR 15 mg PO QAM for ADHD  -Continue Adderall 5-10 mg PO every afternoon PRN for ADHD  -Continue hydroxyzine 25-50 mg PO TID PRN for anxiety    Medication and treatment options, both pharmacological and non-pharmacological treatment options, discussed during today's visit, including any off label use of medication. Patient acknowledged and verbally consented with current treatment plan and was educated on the importance of compliance with treatment and follow-up appointments.        MEDICATION ISSUES:    Discussed treatment plan and medication options of prescribed medication as well as the risks, benefits, any black box warnings, and side effects including potential falls, possible impaired driving, and metabolic adversities among others, including any off label use of medication. Patient is agreeable to call the office with any worsening of symptoms or onset of side effects, or if any concerns or questions arise.  The contact information for the office is made available to the patient. Patient is agreeable to call 911 or go to the nearest ER should they begin having any SI/HI, or if any urgent concerns arise. No medication side effects or related complaints today.       MEDS ORDERED DURING VISIT:  New Medications Ordered This Visit   Medications   • amphetamine-dextroamphetamine XR (Adderall XR) 15 MG 24 hr capsule     Sig: Take 1 capsule by mouth Every Morning for 30 days     Dispense:  30 capsule     Refill:  0   • amphetamine-dextroamphetamine (Adderall) 10 MG tablet     Sig: Take 1/2-1 tablet PO every afternoon PRN     Dispense:  30 tablet     Refill:  0     Pt is also taking Adderall XR       No follow-ups on file.     Treatment plan completed on 2/17/21    Progress toward goal: Not at goal    Functional Status:  Mild impairment     Prognosis: Good with Ongoing Treatment         This document has been electronically signed by MICHEL Guerrero  September 23, 2021 16:05 EDT    Please note that portions of this note were completed with a voice recognition program. Efforts were made to edit dictation, but occasionally words are mistranscribed.

## 2021-10-20 ENCOUNTER — TELEMEDICINE (OUTPATIENT)
Dept: PSYCHIATRY | Facility: CLINIC | Age: 36
End: 2021-10-20

## 2021-10-20 DIAGNOSIS — F41.9 ANXIETY DISORDER, UNSPECIFIED TYPE: Chronic | ICD-10-CM

## 2021-10-20 DIAGNOSIS — F90.2 ATTENTION DEFICIT HYPERACTIVITY DISORDER, COMBINED TYPE: Primary | Chronic | ICD-10-CM

## 2021-10-20 PROCEDURE — 99214 OFFICE O/P EST MOD 30 MIN: CPT | Performed by: NURSE PRACTITIONER

## 2021-10-20 RX ORDER — DEXTROAMPHETAMINE SACCHARATE, AMPHETAMINE ASPARTATE, DEXTROAMPHETAMINE SULFATE AND AMPHETAMINE SULFATE 2.5; 2.5; 2.5; 2.5 MG/1; MG/1; MG/1; MG/1
TABLET ORAL
Qty: 30 TABLET | Refills: 0 | Status: SHIPPED | OUTPATIENT
Start: 2021-10-20 | End: 2021-11-17 | Stop reason: SDUPTHER

## 2021-10-20 RX ORDER — DEXTROAMPHETAMINE SACCHARATE, AMPHETAMINE ASPARTATE MONOHYDRATE, DEXTROAMPHETAMINE SULFATE AND AMPHETAMINE SULFATE 3.75; 3.75; 3.75; 3.75 MG/1; MG/1; MG/1; MG/1
15 CAPSULE, EXTENDED RELEASE ORAL EVERY MORNING
Qty: 30 CAPSULE | Refills: 0 | Status: SHIPPED | OUTPATIENT
Start: 2021-10-20 | End: 2021-11-17 | Stop reason: SDUPTHER

## 2021-10-20 NOTE — PROGRESS NOTES
This provider is located at the [facility] (through [facility]), [address], [place], [address] using a secure [employer] Video Visit through [employer]. Patient is being seen remotely via telehealth at their home address in [place], and stated they are in a secure environment for this session. The patient's condition being diagnosed/treated is appropriate for telemedicine. The provider identified herself as well as her credentials.   The patient, and/or patients guardian, consent to be seen remotely, and when consent is given they understand that the consent allows for patient identifiable information to be sent to a third party as needed.   They may refuse to be seen remotely at any time. The electronic data is encrypted and password protected, and the patient and/or guardian has been advised of the potential risks to privacy not withstanding such measures.    You have chosen to receive care through a telehealth visit.  Do you consent to use a video/audio connection for your medical care today? Yes        Subjective   Honey Thomas is a 36 y.o. female who presents today for follow up    Chief Complaint:  ADHD and anxiety    Accompanied by: Pt was alone for duration of appointment    History of Present Illness:   Pt states she continues to do well on the Adderall XR and Adderall. Pt continues to benefit from the use of a stimulant. Her ability to concentrate and focus has improved; she is not as easily distracted. Pt feels more productive in her daily life as time management skills have also improved. Pt continues to work at both of her jobs. Pt's bathtub is not currently available, so they told pt they couldn't drywall her house until it comes in. Pt is understandably frustrated. Sleep and appetite stable. Pt states that she is having issues with hemorrhoids. It has been extremely painful. She reports that it is literally a blood clot that forms that is the size of a  karen. She seen a colorectal surgeon. He told pt that if it was occurring more frequently he would recommend a hemorrhoidectomy. They usually have to numb her hemorrhoid with lidocaine and use a scalpel to cut and drain it. Pt plans on making an appointment. The patient reports compliance with current medication regimen. The patient denies any current side effects from her current medication regimen. The patient denies any abnormal muscle movements or tics. The patient would like to not adjust or change their medications at this visit. The patient denies any suicidal or homicidal ideations, plans, or intent at today's encounter and is convincing.  The patient denies any auditory hallucinations or visual hallucinations. The patient does not endorse any significant symptoms consistent with lulu or psychosis at today's encounter.        Prior Psychiatric Medications:  Buspar: pt struggled to remember to take multiple times a day  Zoloft: ineffective  Wellbutrin XL: ineffective  Paxil: ineffective  Strattera: nausea, irritability        The following portions of the patient's history were reviewed and updated as appropriate: allergies, current medications, past family history, past medical history, past social history, past surgical history and problem list.          Past Medical History:  Past Medical History:   Diagnosis Date   • Anxiety    • BPPV (benign paroxysmal positional vertigo)    • Carpal tunnel syndrome 2010    bilat   • TMJ (dislocation of temporomandibular joint) 2/4/2021   • Vitamin D deficiency        Social History:  Social History     Socioeconomic History   • Marital status:    Tobacco Use   • Smoking status: Never Smoker   • Smokeless tobacco: Never Used   Substance and Sexual Activity   • Alcohol use: Yes     Comment: social   • Drug use: No   • Sexual activity: Yes     Partners: Male     Birth control/protection: Surgical       Family History:  Family History   Problem Relation Age of  Onset   • Hypertension Father    • Diabetes Father    • Hypertension Mother    • Ovarian cancer Mother 37   • Graves' disease Sister    • Scoliosis Sister    • Sleep apnea Sister    • Lung cancer Maternal Grandmother    • Alzheimer's disease Paternal Grandmother    • Other Paternal Grandfather         accident   • Autism Niece        Past Surgical History:  Past Surgical History:   Procedure Laterality Date   • ABDOMINOPLASTY N/A 2019    Procedure: ABDOMINOPLASTY;  Surgeon: Alphonso Vasquez MD;  Location:  [facility];  Service: Plastics   • BREAST AUGMENTATION Bilateral 2019    Procedure: BREAST AUGMENTATION WITH IMPLANTS BILATERAL;  Surgeon: Alphonso Vasquez MD;  Location:  [facility];  Service: Plastics   • FRACTURE SURGERY Left 1995    lEFT FOREARM   • TOTAL LAPAROSCOPIC HYSTERECTOMY SALPINGO OOPHORECTOMY N/A 2019    Procedure: TOTAL ROBOTIC HYSTERECTOMY WITH BILATERAL SALPINGO-OOPHORECTOMY;  Surgeon: Summer Dorsey DO;  Location:  [facility];  Service: DaVinci   • WISDOM TOOTH EXTRACTION         Problem List:  Patient Active Problem List   Diagnosis   •  (normal spontaneous vaginal delivery)   • Vitamin D deficiency   • Anxiety   • BPPV (benign paroxysmal positional vertigo)   • Carpal tunnel syndrome   • TMJ (dislocation of temporomandibular joint)       Allergy:   No Known Allergies     Current Medications:   Current Outpatient Medications   Medication Sig Dispense Refill   • amphetamine-dextroamphetamine (Adderall) 10 MG tablet Take 1/2-1 tablet PO every afternoon PRN 30 tablet 0   • amphetamine-dextroamphetamine XR (Adderall XR) 15 MG 24 hr capsule Take 1 capsule by mouth Every Morning for 30 days 30 capsule 0   • cyclobenzaprine (FLEXERIL) 10 MG tablet Take 1 tablet by mouth 3 (Three) Times a Day As Needed for Muscle Spasms. 30 tablet 1   • estradiol (ESTRACE) 1 MG tablet TAKE 1 TABLET BY MOUTH EVERY DAY 14 tablet 0   • hydrOXYzine (ATARAX) 25 MG tablet Take 1-2 tablets PO TID PRN  for anxiety/sleep 180 tablet 0     No current facility-administered medications for this visit.       Review of Symptoms:    Review of Systems   Constitutional: Negative.    Gastrointestinal: Positive for rectal pain.   Psychiatric/Behavioral: Positive for stress.         Physical Exam:   Due to the remote nature of this encounter (virtual encounter), vitals were unable to be obtained.  Height stated at 66 inches.  Weight stated at 184 pounds.         Physical Exam  Neurological:      Mental Status: She is alert.   Psychiatric:         Attention and Perception: Attention and perception normal. She does not perceive auditory or visual hallucinations.         Mood and Affect: Mood and affect normal.         Speech: Speech normal.         Behavior: Behavior normal. Behavior is cooperative.         Thought Content: Thought content normal. Thought content is not paranoid or delusional. Thought content does not include homicidal or suicidal ideation. Thought content does not include homicidal or suicidal plan.         Cognition and Memory: Cognition and memory normal.         Judgment: Judgment normal.           Mental Status Exam:   Hygiene:   good  Cooperation:  Cooperative  Eye Contact:  Good  Psychomotor Behavior:  Appropriate  Affect:  Appropriate  Mood: normal  Speech:  Normal  Thought Process:  Goal directed  Thought Content:  Normal  Suicidal:  None  Homicidal:  None  Hallucinations:  None  Delusion:  None  Memory:  Intact  Orientation:  Person, Place, Time and Situation  Reliability:  good  Insight:  Good  Judgement:  Good  Impulse Control:  Good  Physical/Medical Issues:  No          Previous Provider notes and available records reviewed by this APRN at today's encounter.         Lab Results:   No visits with results within 1 Month(s) from this visit.   Latest known visit with results is:   Telemedicine on 03/16/2021   Component Date Value Ref Range Status   • THC, Screen, Urine 03/16/2021 Negative  Negative  Final   • Phencyclidine (PCP), Urine 03/16/2021 Negative  Negative Final   • Cocaine Screen, Urine 03/16/2021 Negative  Negative Final   • Methamphetamine, Ur 03/16/2021 Negative  Negative Final   • Opiate Screen 03/16/2021 Negative  Negative Final   • Amphetamine Screen, Urine 03/16/2021 Negative  Negative Final   • Benzodiazepine Screen, Urine 03/16/2021 Negative  Negative Final   • Tricyclic Antidepressants Screen 03/16/2021 Negative  Negative Final   • Methadone Screen, Urine 03/16/2021 Negative  Negative Final   • Barbiturates Screen, Urine 03/16/2021 Negative  Negative Final   • Oxycodone Screen, Urine 03/16/2021 Negative  Negative Final   • Propoxyphene Screen 03/16/2021 Negative  Negative Final   • Buprenorphine, Screen, Urine 03/16/2021 Negative  Negative Final         Assessment/Plan   Problems Addressed this Visit     None      Visit Diagnoses     Attention deficit hyperactivity disorder, combined type  (Chronic)   -  Primary    Relevant Medications    amphetamine-dextroamphetamine XR (Adderall XR) 15 MG 24 hr capsule    amphetamine-dextroamphetamine (Adderall) 10 MG tablet    Anxiety disorder, unspecified type  (Chronic)       Relevant Medications    amphetamine-dextroamphetamine XR (Adderall XR) 15 MG 24 hr capsule    amphetamine-dextroamphetamine (Adderall) 10 MG tablet      Diagnoses       Codes Comments    Attention deficit hyperactivity disorder, combined type    -  Primary ICD-10-CM: F90.2  ICD-9-CM: 314.01     Anxiety disorder, unspecified type     ICD-10-CM: F41.9  ICD-9-CM: 300.00           Visit Diagnoses:    ICD-10-CM ICD-9-CM   1. Attention deficit hyperactivity disorder, combined type  F90.2 314.01   2. Anxiety disorder, unspecified type  F41.9 300.00          GOALS:  Short Term Goals: Patient will be compliant with medication, and patient will have no significant medication related side effects.  Patient will be engaged in psychotherapy as indicated.  Patient will report subjective  improvement of symptoms.  Long term goals: To stabilize mood and treat/improve subjective symptoms, the patient will stay out of the hospital, the patient will be at an optimal level of functioning, and the patient will take all medications as prescribed.  The patient verbalized understanding and agreement with goals that were mutually set.      TREATMENT PLAN:   -Continue Adderall XR 15 mg PO QAM for ADHD  -Continue Adderall 5-10 mg PO every afternoon PRN for ADHD  -Continue hydroxyzine 25-50 mg PO TID PRN for anxiety    Medication and treatment options, both pharmacological and non-pharmacological treatment options, discussed during today's visit, including any off label use of medication. Patient acknowledged and verbally consented with current treatment plan and was educated on the importance of compliance with treatment and follow-up appointments.        MEDICATION ISSUES:    Discussed treatment plan and medication options of prescribed medication as well as the risks, benefits, any black box warnings, and side effects including potential falls, possible impaired driving, and metabolic adversities among others, including any off label use of medication. Patient is agreeable to call the office with any worsening of symptoms or onset of side effects, or if any concerns or questions arise.  The contact information for the office is made available to the patient. Patient is agreeable to call 911 or go to the nearest ER should they begin having any SI/HI, or if any urgent concerns arise. No medication side effects or related complaints today.       MEDS ORDERED DURING VISIT:  New Medications Ordered This Visit   Medications   • amphetamine-dextroamphetamine XR (Adderall XR) 15 MG 24 hr capsule     Sig: Take 1 capsule by mouth Every Morning for 30 days     Dispense:  30 capsule     Refill:  0   • amphetamine-dextroamphetamine (Adderall) 10 MG tablet     Sig: Take 1/2-1 tablet PO every afternoon PRN     Dispense:  30  tablet     Refill:  0     Pt is also taking Adderall XR       Return in about 4 weeks (around 11/17/2021), or if symptoms worsen or fail to improve, for Recheck.     Treatment plan completed on 2/17/21    Progress toward goal: Not at goal    Functional Status: Mild impairment     Prognosis: Good with Ongoing Treatment         This document has been electronically signed by MICHEL Guerrero  October 20, 2021 08:59 EDT    Please note that portions of this note were completed with a voice recognition program. Efforts were made to edit dictation, but occasionally words are mistranscribed.

## 2021-11-17 ENCOUNTER — TELEMEDICINE (OUTPATIENT)
Dept: PSYCHIATRY | Facility: CLINIC | Age: 36
End: 2021-11-17

## 2021-11-17 DIAGNOSIS — F41.9 ANXIETY DISORDER, UNSPECIFIED TYPE: Chronic | ICD-10-CM

## 2021-11-17 DIAGNOSIS — F90.2 ATTENTION DEFICIT HYPERACTIVITY DISORDER, COMBINED TYPE: Primary | Chronic | ICD-10-CM

## 2021-11-17 PROCEDURE — 99214 OFFICE O/P EST MOD 30 MIN: CPT | Performed by: NURSE PRACTITIONER

## 2021-11-17 RX ORDER — DEXTROAMPHETAMINE SACCHARATE, AMPHETAMINE ASPARTATE, DEXTROAMPHETAMINE SULFATE AND AMPHETAMINE SULFATE 2.5; 2.5; 2.5; 2.5 MG/1; MG/1; MG/1; MG/1
TABLET ORAL
Qty: 30 TABLET | Refills: 0 | Status: SHIPPED | OUTPATIENT
Start: 2021-11-17 | End: 2021-12-16 | Stop reason: SDUPTHER

## 2021-11-17 RX ORDER — DEXTROAMPHETAMINE SACCHARATE, AMPHETAMINE ASPARTATE MONOHYDRATE, DEXTROAMPHETAMINE SULFATE AND AMPHETAMINE SULFATE 3.75; 3.75; 3.75; 3.75 MG/1; MG/1; MG/1; MG/1
15 CAPSULE, EXTENDED RELEASE ORAL EVERY MORNING
Qty: 30 CAPSULE | Refills: 0 | Status: SHIPPED | OUTPATIENT
Start: 2021-11-17 | End: 2021-12-16 | Stop reason: SDUPTHER

## 2021-11-17 NOTE — PROGRESS NOTES
"This provider is located at the [facility] (through [facility]), [address], [facility] [place], [address] using a secure [employer] Video Visit through [employer]. Patient is being seen remotely via telehealth at their home address in [place], and stated they are in a secure environment for this session. The patient's condition being diagnosed/treated is appropriate for telemedicine. The provider identified herself as well as her credentials.   The patient, and/or patients guardian, consent to be seen remotely, and when consent is given they understand that the consent allows for patient identifiable information to be sent to a third party as needed.   They may refuse to be seen remotely at any time. The electronic data is encrypted and password protected, and the patient and/or guardian has been advised of the potential risks to privacy not withstanding such measures.    You have chosen to receive care through a telehealth visit.  Do you consent to use a video/audio connection for your medical care today? Yes        Subjective   Honey Thomas is a 36 y.o. female who presents today for follow up    Chief Complaint:  ADHD and anxiety    Accompanied by: Pt was alone for duration of appointment    History of Present Illness:   Pt reports she has been doing well overall. Pt continues to work both of her jobs. Pt feels when she leaves her job at [employer] she is done for the day, but with her job at the [employer] she never feels it is over. She has to set alarms to remember everything. However, the Adderalls continue to be beneficial and pt needs it for a better quality of life. Pt has the pre-drywall walk through today. Pt is hoping to have drywall in her house before December. Her lease is up in January and her apartment complex is wanting to charge over $800 for a month-to-month lease. Pt's parents have offered to let them stay with them until their house is complete. Pt feels the \"hamster " "wheel\" in her head is turning at night again, but she feels it is situational. Sleep is stable. She feels rested when she wakes. Appetite is stable. Pt has been cooking new recipes for her family. Pt has a hard time sitting down for long periods of time. Pt states that she saw a colorectal surgeon about her hemorrhoids and it has resolved. The patient denies any new medical problems or changes in medications since last appointment with this facility. The patient reports compliance with current medication regimen. The patient denies any current side effects from her current medication regimen. The patient denies any abnormal muscle movements or tics. The patient would like to not adjust or change their medications at this visit. The patient denies any suicidal or homicidal ideations, plans, or intent at today's encounter and is convincing.  The patient denies any auditory hallucinations or visual hallucinations. The patient does not endorse any significant symptoms consistent with lulu or psychosis at today's encounter.        Prior Psychiatric Medications:  Buspar: pt struggled to remember to take multiple times a day  Zoloft: ineffective  Wellbutrin XL: ineffective  Paxil: ineffective  Strattera: nausea, irritability        The following portions of the patient's history were reviewed and updated as appropriate: allergies, current medications, past family history, past medical history, past social history, past surgical history and problem list.          Past Medical History:  Past Medical History:   Diagnosis Date   • Anxiety    • BPPV (benign paroxysmal positional vertigo)    • Carpal tunnel syndrome 2010    bilat   • TMJ (dislocation of temporomandibular joint) 2/4/2021   • Vitamin D deficiency        Social History:  Social History     Socioeconomic History   • Marital status:    Tobacco Use   • Smoking status: Never Smoker   • Smokeless tobacco: Never Used   Substance and Sexual Activity   • Alcohol " use: Yes     Comment: social   • Drug use: No   • Sexual activity: Yes     Partners: Male     Birth control/protection: Surgical       Family History:  Family History   Problem Relation Age of Onset   • Hypertension Father    • Diabetes Father    • Hypertension Mother    • Ovarian cancer Mother 37   • Graves' disease Sister    • Scoliosis Sister    • Sleep apnea Sister    • Lung cancer Maternal Grandmother    • Alzheimer's disease Paternal Grandmother    • Other Paternal Grandfather         accident   • Autism Niece        Past Surgical History:  Past Surgical History:   Procedure Laterality Date   • ABDOMINOPLASTY N/A 2019    Procedure: ABDOMINOPLASTY;  Surgeon: Alphonso Vasquez MD;  Location:  [facility];  Service: Plastics   • BREAST AUGMENTATION Bilateral 2019    Procedure: BREAST AUGMENTATION WITH IMPLANTS BILATERAL;  Surgeon: Alphonso Vasquez MD;  Location:  [facility];  Service: Plastics   • FRACTURE SURGERY Left 1995    lEFT FOREARM   • TOTAL LAPAROSCOPIC HYSTERECTOMY SALPINGO OOPHORECTOMY N/A 2019    Procedure: TOTAL ROBOTIC HYSTERECTOMY WITH BILATERAL SALPINGO-OOPHORECTOMY;  Surgeon: Summer Dorsey DO;  Location:  [facility];  Service: DaVinci   • WISDOM TOOTH EXTRACTION         Problem List:  Patient Active Problem List   Diagnosis   •  (normal spontaneous vaginal delivery)   • Vitamin D deficiency   • Anxiety   • BPPV (benign paroxysmal positional vertigo)   • Carpal tunnel syndrome   • TMJ (dislocation of temporomandibular joint)       Allergy:   No Known Allergies     Current Medications:   Current Outpatient Medications   Medication Sig Dispense Refill   • amphetamine-dextroamphetamine (Adderall) 10 MG tablet Take 1/2-1 tablet PO every afternoon PRN 30 tablet 0   • amphetamine-dextroamphetamine XR (Adderall XR) 15 MG 24 hr capsule Take 1 capsule by mouth Every Morning for 30 days 30 capsule 0   • cyclobenzaprine (FLEXERIL) 10 MG tablet Take 1 tablet by mouth 3 (Three) Times a  Day As Needed for Muscle Spasms. 30 tablet 1   • estradiol (ESTRACE) 1 MG tablet TAKE 1 TABLET BY MOUTH EVERY DAY 14 tablet 0   • hydrOXYzine (ATARAX) 25 MG tablet Take 1-2 tablets PO TID PRN for anxiety/sleep 180 tablet 0     No current facility-administered medications for this visit.       Review of Symptoms:    Review of Systems   Constitutional: Negative.    Psychiatric/Behavioral: Positive for stress.         Physical Exam:   Due to the remote nature of this encounter (virtual encounter), vitals were unable to be obtained.  Height stated at 66 inches.  Weight stated at 184 pounds.         Physical Exam  Neurological:      Mental Status: She is alert.   Psychiatric:         Attention and Perception: Attention and perception normal.         Mood and Affect: Mood and affect normal.         Speech: Speech normal.         Behavior: Behavior normal. Behavior is cooperative.         Thought Content: Thought content normal. Thought content is not paranoid or delusional. Thought content does not include homicidal or suicidal ideation. Thought content does not include homicidal or suicidal plan.         Cognition and Memory: Cognition and memory normal.         Judgment: Judgment normal.           Mental Status Exam:   Hygiene:   good  Cooperation:  Cooperative  Eye Contact:  Good  Psychomotor Behavior:  Appropriate  Affect:  Appropriate  Mood: normal  Speech:  Normal  Thought Process:  Linear  Thought Content:  Normal  Suicidal:  None  Homicidal:  None  Hallucinations:  None  Delusion:  None  Memory:  Intact  Orientation:  Person, Place, Time and Situation  Reliability:  good  Insight:  Good  Judgement:  Good  Impulse Control:  Good  Physical/Medical Issues:  No          Previous Provider notes and available records reviewed by this APRN at today's encounter.         Lab Results:   No visits with results within 1 Month(s) from this visit.   Latest known visit with results is:   Telemedicine on 03/16/2021   Component  Date Value Ref Range Status   • THC, Screen, Urine 03/16/2021 Negative  Negative Final   • Phencyclidine (PCP), Urine 03/16/2021 Negative  Negative Final   • Cocaine Screen, Urine 03/16/2021 Negative  Negative Final   • Methamphetamine, Ur 03/16/2021 Negative  Negative Final   • Opiate Screen 03/16/2021 Negative  Negative Final   • Amphetamine Screen, Urine 03/16/2021 Negative  Negative Final   • Benzodiazepine Screen, Urine 03/16/2021 Negative  Negative Final   • Tricyclic Antidepressants Screen 03/16/2021 Negative  Negative Final   • Methadone Screen, Urine 03/16/2021 Negative  Negative Final   • Barbiturates Screen, Urine 03/16/2021 Negative  Negative Final   • Oxycodone Screen, Urine 03/16/2021 Negative  Negative Final   • Propoxyphene Screen 03/16/2021 Negative  Negative Final   • Buprenorphine, Screen, Urine 03/16/2021 Negative  Negative Final         Assessment/Plan   Problems Addressed this Visit     None      Visit Diagnoses     Attention deficit hyperactivity disorder, combined type  (Chronic)   -  Primary    Relevant Medications    amphetamine-dextroamphetamine XR (Adderall XR) 15 MG 24 hr capsule    amphetamine-dextroamphetamine (Adderall) 10 MG tablet    Anxiety disorder, unspecified type  (Chronic)       Relevant Medications    amphetamine-dextroamphetamine XR (Adderall XR) 15 MG 24 hr capsule    amphetamine-dextroamphetamine (Adderall) 10 MG tablet      Diagnoses       Codes Comments    Attention deficit hyperactivity disorder, combined type    -  Primary ICD-10-CM: F90.2  ICD-9-CM: 314.01     Anxiety disorder, unspecified type     ICD-10-CM: F41.9  ICD-9-CM: 300.00           Visit Diagnoses:    ICD-10-CM ICD-9-CM   1. Attention deficit hyperactivity disorder, combined type  F90.2 314.01   2. Anxiety disorder, unspecified type  F41.9 300.00          GOALS:  Short Term Goals: Patient will be compliant with medication, and patient will have no significant medication related side effects.  Patient will  be engaged in psychotherapy as indicated.  Patient will report subjective improvement of symptoms.  Long term goals: To stabilize mood and treat/improve subjective symptoms, the patient will stay out of the hospital, the patient will be at an optimal level of functioning, and the patient will take all medications as prescribed.  The patient verbalized understanding and agreement with goals that were mutually set.      TREATMENT PLAN:   -Continue Adderall XR 15 mg PO QAM for ADHD  -Continue Adderall 5-10 mg PO every afternoon PRN for ADHD  -Continue hydroxyzine 25-50 mg PO TID PRN for anxiety    Medication and treatment options, both pharmacological and non-pharmacological treatment options, discussed during today's visit, including any off label use of medication. Patient acknowledged and verbally consented with current treatment plan and was educated on the importance of compliance with treatment and follow-up appointments.        MEDICATION ISSUES:    Discussed treatment plan and medication options of prescribed medication as well as the risks, benefits, any black box warnings, and side effects including potential falls, possible impaired driving, and metabolic adversities among others, including any off label use of medication. Patient is agreeable to call the office with any worsening of symptoms or onset of side effects, or if any concerns or questions arise.  The contact information for the office is made available to the patient. Patient is agreeable to call 911 or go to the nearest ER should they begin having any SI/HI, or if any urgent concerns arise. No medication side effects or related complaints today.       MEDS ORDERED DURING VISIT:  New Medications Ordered This Visit   Medications   • amphetamine-dextroamphetamine XR (Adderall XR) 15 MG 24 hr capsule     Sig: Take 1 capsule by mouth Every Morning for 30 days     Dispense:  30 capsule     Refill:  0   • amphetamine-dextroamphetamine (Adderall) 10 MG  tablet     Sig: Take 1/2-1 tablet PO every afternoon PRN     Dispense:  30 tablet     Refill:  0     Pt is also taking Adderall XR       Return in about 4 weeks (around 12/15/2021), or if symptoms worsen or fail to improve, for Recheck.     Treatment plan completed on 2/17/21    Progress toward goal: Not at goal    Functional Status: Mild impairment     Prognosis: Good with Ongoing Treatment         This document has been electronically signed by MICHEL Guerrero  November 17, 2021 08:33 EST     Some of the data in this electronic note has been brought forward from a previous encounter, any necessary changes have been made, it has been reviewed by this APRN, and it is accurate.    Please note that portions of this note were completed with a voice recognition program. Efforts were made to edit dictation, but occasionally words are mistranscribed.

## 2021-12-16 ENCOUNTER — TELEMEDICINE (OUTPATIENT)
Dept: PSYCHIATRY | Facility: CLINIC | Age: 36
End: 2021-12-16

## 2021-12-16 DIAGNOSIS — F90.2 ATTENTION DEFICIT HYPERACTIVITY DISORDER, COMBINED TYPE: Primary | Chronic | ICD-10-CM

## 2021-12-16 DIAGNOSIS — F41.9 ANXIETY DISORDER, UNSPECIFIED TYPE: Chronic | ICD-10-CM

## 2021-12-16 PROCEDURE — 99214 OFFICE O/P EST MOD 30 MIN: CPT | Performed by: NURSE PRACTITIONER

## 2021-12-16 RX ORDER — DEXTROAMPHETAMINE SACCHARATE, AMPHETAMINE ASPARTATE MONOHYDRATE, DEXTROAMPHETAMINE SULFATE AND AMPHETAMINE SULFATE 3.75; 3.75; 3.75; 3.75 MG/1; MG/1; MG/1; MG/1
15 CAPSULE, EXTENDED RELEASE ORAL EVERY MORNING
Qty: 30 CAPSULE | Refills: 0 | Status: SHIPPED | OUTPATIENT
Start: 2021-12-16 | End: 2022-01-15

## 2021-12-16 RX ORDER — DEXTROAMPHETAMINE SACCHARATE, AMPHETAMINE ASPARTATE, DEXTROAMPHETAMINE SULFATE AND AMPHETAMINE SULFATE 2.5; 2.5; 2.5; 2.5 MG/1; MG/1; MG/1; MG/1
TABLET ORAL
Qty: 30 TABLET | Refills: 0 | Status: SHIPPED | OUTPATIENT
Start: 2021-12-16 | End: 2022-01-31 | Stop reason: SDUPTHER

## 2021-12-16 NOTE — PROGRESS NOTES
This provider is located at the [facility] (through [facility]), [address] [place], [place], [address] using a secure [place] Video Visit through [employer]. Patient is being seen remotely via telehealth at their home address in [place], and stated they are in a secure environment for this session. The patient's condition being diagnosed/treated is appropriate for telemedicine. The provider identified herself as well as her credentials.   The patient, and/or patients guardian, consent to be seen remotely, and when consent is given they understand that the consent allows for patient identifiable information to be sent to a third party as needed.   They may refuse to be seen remotely at any time. The electronic data is encrypted and password protected, and the patient and/or guardian has been advised of the potential risks to privacy not withstanding such measures.    You have chosen to receive care through a telehealth visit.  Do you consent to use a video/audio connection for your medical care today? Yes        Subjective   Honey Thomas is a 36 y.o. female who presents today for follow up    Chief Complaint:  ADHD and anxiety    Accompanied by: Pt was alone for duration of appointment    History of Present Illness:   Pt reports that she enjoyed Thanksgiving with her family. Pt has been doing well since the last appointment. For the past two weeks she has been having to get her children up and ready for . Her  usually does this and she realizes how stressful it is. The tentative closing day for her house is February 28th. Her drywall is now complete. Pt is going to live with parents temporarily when her lease is up. Pt continues to benefit from the use of a stimulant. Her ability to concentrate and focus has improved; she is not as easily distracted. Pt feels more productive in her daily life as time management skills have also improved. Mood has been stable  as has sleep and appetite. The patient denies any new medical problems or changes in medications since last appointment with this facility with the exception of having discomfort swallowing at times. Recommended pt discuss with pt. The patient reports compliance with current medication regimen. The patient denies any current side effects from her current medication regimen. The patient denies any abnormal muscle movements or tics. The patient would like to not adjust or change their medications at this visit. The patient denies any suicidal or homicidal ideations, plans, or intent at today's encounter and is convincing.  The patient denies any auditory hallucinations or visual hallucinations. The patient does not endorse any significant symptoms consistent with lulu or psychosis at today's encounter.          Prior Psychiatric Medications:  Buspar: pt struggled to remember to take multiple times a day  Zoloft: ineffective  Wellbutrin XL: ineffective  Paxil: ineffective  Strattera: nausea, irritability        The following portions of the patient's history were reviewed and updated as appropriate: allergies, current medications, past family history, past medical history, past social history, past surgical history and problem list.          Past Medical History:  Past Medical History:   Diagnosis Date   • Anxiety    • BPPV (benign paroxysmal positional vertigo)    • Carpal tunnel syndrome 2010    bilat   • TMJ (dislocation of temporomandibular joint) 2/4/2021   • Vitamin D deficiency        Social History:  Social History     Socioeconomic History   • Marital status:    Tobacco Use   • Smoking status: Never Smoker   • Smokeless tobacco: Never Used   Substance and Sexual Activity   • Alcohol use: Yes     Comment: social   • Drug use: No   • Sexual activity: Yes     Partners: Male     Birth control/protection: Surgical       Family History:  Family History   Problem Relation Age of Onset   • Hypertension Father     • Diabetes Father    • Hypertension Mother    • Ovarian cancer Mother 37   • Graves' disease Sister    • Scoliosis Sister    • Sleep apnea Sister    • Lung cancer Maternal Grandmother    • Alzheimer's disease Paternal Grandmother    • Other Paternal Grandfather         accident   • Autism Niece        Past Surgical History:  Past Surgical History:   Procedure Laterality Date   • ABDOMINOPLASTY N/A 2019    Procedure: ABDOMINOPLASTY;  Surgeon: Alphonso Vasquez MD;  Location:  [facility];  Service: Plastics   • BREAST AUGMENTATION Bilateral 2019    Procedure: BREAST AUGMENTATION WITH IMPLANTS BILATERAL;  Surgeon: Alphonso Vasquez MD;  Location:  [facility];  Service: Plastics   • FRACTURE SURGERY Left 1995    lEFT FOREARM   • TOTAL LAPAROSCOPIC HYSTERECTOMY SALPINGO OOPHORECTOMY N/A 2019    Procedure: TOTAL ROBOTIC HYSTERECTOMY WITH BILATERAL SALPINGO-OOPHORECTOMY;  Surgeon: Summer Dorsey DO;  Location:  [facility];  Service: DaVinci   • WISDOM TOOTH EXTRACTION         Problem List:  Patient Active Problem List   Diagnosis   •  (normal spontaneous vaginal delivery)   • Vitamin D deficiency   • Anxiety   • BPPV (benign paroxysmal positional vertigo)   • Carpal tunnel syndrome   • TMJ (dislocation of temporomandibular joint)       Allergy:   No Known Allergies     Current Medications:   Current Outpatient Medications   Medication Sig Dispense Refill   • amphetamine-dextroamphetamine (Adderall) 10 MG tablet Take 1/2-1 tablet PO every afternoon PRN 30 tablet 0   • amphetamine-dextroamphetamine XR (Adderall XR) 15 MG 24 hr capsule Take 1 capsule by mouth Every Morning for 30 days 30 capsule 0   • cyclobenzaprine (FLEXERIL) 10 MG tablet Take 1 tablet by mouth 3 (Three) Times a Day As Needed for Muscle Spasms. 30 tablet 1   • estradiol (ESTRACE) 1 MG tablet TAKE 1 TABLET BY MOUTH EVERY DAY 14 tablet 0   • hydrOXYzine (ATARAX) 25 MG tablet Take 1-2 tablets PO TID PRN for anxiety/sleep 180 tablet 0      No current facility-administered medications for this visit.       Review of Symptoms:    Review of Systems   Constitutional: Negative.    Psychiatric/Behavioral: Positive for stress.         Physical Exam:   Due to the remote nature of this encounter (virtual encounter), vitals were unable to be obtained.  Height stated at 66 inches.  Weight stated at 184 pounds.         Physical Exam  Neurological:      Mental Status: She is alert.   Psychiatric:         Attention and Perception: Attention and perception normal. She does not perceive auditory or visual hallucinations.         Mood and Affect: Mood and affect normal.         Speech: Speech normal.         Behavior: Behavior normal. Behavior is cooperative.         Thought Content: Thought content normal. Thought content is not paranoid or delusional. Thought content does not include homicidal or suicidal ideation. Thought content does not include homicidal or suicidal plan.         Cognition and Memory: Cognition and memory normal.         Judgment: Judgment normal.           Mental Status Exam:   Hygiene:   good  Cooperation:  Cooperative  Eye Contact:  Good  Psychomotor Behavior:  Appropriate  Affect:  Appropriate  Mood: normal  Speech:  Normal  Thought Process:  Goal directed  Thought Content:  Normal  Suicidal:  None  Homicidal:  None  Hallucinations:  None  Delusion:  None  Memory:  Intact  Orientation:  Person, Place, Time and Situation  Reliability:  good  Insight:  Good  Judgement:  Good  Impulse Control:  Good  Physical/Medical Issues:  No          [facility] request number [id number] reviewed by this APRN at today's encounter.      Previous Provider notes and available records reviewed by this APRN at today's encounter.         Lab Results:   No visits with results within 1 Month(s) from this visit.   Latest known visit with results is:   Telemedicine on 03/16/2021   Component Date Value Ref Range Status   • THC, Screen, Urine 03/16/2021 Negative  Negative  Final   • Phencyclidine (PCP), Urine 03/16/2021 Negative  Negative Final   • Cocaine Screen, Urine 03/16/2021 Negative  Negative Final   • Methamphetamine, Ur 03/16/2021 Negative  Negative Final   • Opiate Screen 03/16/2021 Negative  Negative Final   • Amphetamine Screen, Urine 03/16/2021 Negative  Negative Final   • Benzodiazepine Screen, Urine 03/16/2021 Negative  Negative Final   • Tricyclic Antidepressants Screen 03/16/2021 Negative  Negative Final   • Methadone Screen, Urine 03/16/2021 Negative  Negative Final   • Barbiturates Screen, Urine 03/16/2021 Negative  Negative Final   • Oxycodone Screen, Urine 03/16/2021 Negative  Negative Final   • Propoxyphene Screen 03/16/2021 Negative  Negative Final   • Buprenorphine, Screen, Urine 03/16/2021 Negative  Negative Final         Assessment/Plan   Problems Addressed this Visit     None      Visit Diagnoses     Attention deficit hyperactivity disorder, combined type  (Chronic)   -  Primary    Relevant Medications    amphetamine-dextroamphetamine (Adderall) 10 MG tablet    amphetamine-dextroamphetamine XR (Adderall XR) 15 MG 24 hr capsule    Anxiety disorder, unspecified type  (Chronic)       Relevant Medications    amphetamine-dextroamphetamine (Adderall) 10 MG tablet    amphetamine-dextroamphetamine XR (Adderall XR) 15 MG 24 hr capsule      Diagnoses       Codes Comments    Attention deficit hyperactivity disorder, combined type    -  Primary ICD-10-CM: F90.2  ICD-9-CM: 314.01     Anxiety disorder, unspecified type     ICD-10-CM: F41.9  ICD-9-CM: 300.00           Visit Diagnoses:    ICD-10-CM ICD-9-CM   1. Attention deficit hyperactivity disorder, combined type  F90.2 314.01   2. Anxiety disorder, unspecified type  F41.9 300.00          GOALS:  Short Term Goals: Patient will be compliant with medication, and patient will have no significant medication related side effects.  Patient will be engaged in psychotherapy as indicated.  Patient will report subjective  improvement of symptoms.  Long term goals: To stabilize mood and treat/improve subjective symptoms, the patient will stay out of the hospital, the patient will be at an optimal level of functioning, and the patient will take all medications as prescribed.  The patient verbalized understanding and agreement with goals that were mutually set.      TREATMENT PLAN:   -Continue Adderall XR 15 mg PO QAM for ADHD  -Continue Adderall 5-10 mg PO every afternoon PRN for ADHD  -Continue hydroxyzine 25-50 mg PO TID PRN for anxiety    Medication and treatment options, both pharmacological and non-pharmacological treatment options, discussed during today's visit, including any off label use of medication. Patient acknowledged and verbally consented with current treatment plan and was educated on the importance of compliance with treatment and follow-up appointments.        MEDICATION ISSUES:    Discussed treatment plan and medication options of prescribed medication as well as the risks, benefits, any black box warnings, and side effects including potential falls, possible impaired driving, and metabolic adversities among others, including any off label use of medication. Patient is agreeable to call the office with any worsening of symptoms or onset of side effects, or if any concerns or questions arise.  The contact information for the office is made available to the patient. Patient is agreeable to call 911 or go to the nearest ER should they begin having any SI/HI, or if any urgent concerns arise. No medication side effects or related complaints today.       MEDS ORDERED DURING VISIT:  New Medications Ordered This Visit   Medications   • amphetamine-dextroamphetamine (Adderall) 10 MG tablet     Sig: Take 1/2-1 tablet PO every afternoon PRN     Dispense:  30 tablet     Refill:  0     Pt is also taking Adderall XR   • amphetamine-dextroamphetamine XR (Adderall XR) 15 MG 24 hr capsule     Sig: Take 1 capsule by mouth Every  Morning for 30 days     Dispense:  30 capsule     Refill:  0       Return in about 4 weeks (around 1/13/2022), or if symptoms worsen or fail to improve, for Recheck.     Treatment plan completed on 2/17/21    Progress toward goal: Not at goal    Functional Status: Mild impairment     Prognosis: Good with Ongoing Treatment         This document has been electronically signed by MICHEL Guerreor  December 16, 2021 08:28 EST     Some of the data in this electronic note has been brought forward from a previous encounter, any necessary changes have been made, it has been reviewed by this APRN, and it is accurate.    Please note that portions of this note were completed with a voice recognition program. Efforts were made to edit dictation, but occasionally words are mistranscribed.

## 2022-01-31 ENCOUNTER — TELEMEDICINE (OUTPATIENT)
Dept: PSYCHIATRY | Facility: CLINIC | Age: 37
End: 2022-01-31

## 2022-01-31 DIAGNOSIS — F41.9 ANXIETY DISORDER, UNSPECIFIED TYPE: Chronic | ICD-10-CM

## 2022-01-31 DIAGNOSIS — F90.2 ATTENTION DEFICIT HYPERACTIVITY DISORDER, COMBINED TYPE: Primary | Chronic | ICD-10-CM

## 2022-01-31 PROCEDURE — 99214 OFFICE O/P EST MOD 30 MIN: CPT | Performed by: NURSE PRACTITIONER

## 2022-01-31 RX ORDER — DEXTROAMPHETAMINE SACCHARATE, AMPHETAMINE ASPARTATE, DEXTROAMPHETAMINE SULFATE AND AMPHETAMINE SULFATE 2.5; 2.5; 2.5; 2.5 MG/1; MG/1; MG/1; MG/1
TABLET ORAL
Qty: 30 TABLET | Refills: 0 | Status: SHIPPED | OUTPATIENT
Start: 2022-01-31 | End: 2022-03-08 | Stop reason: SDUPTHER

## 2022-01-31 RX ORDER — DEXTROAMPHETAMINE SACCHARATE, AMPHETAMINE ASPARTATE MONOHYDRATE, DEXTROAMPHETAMINE SULFATE AND AMPHETAMINE SULFATE 3.75; 3.75; 3.75; 3.75 MG/1; MG/1; MG/1; MG/1
15 CAPSULE, EXTENDED RELEASE ORAL EVERY MORNING
Qty: 30 CAPSULE | Refills: 0 | Status: SHIPPED | OUTPATIENT
Start: 2022-01-31 | End: 2022-03-02

## 2022-01-31 NOTE — PROGRESS NOTES
This provider is located at the [facility] (through [facility]), [address], [facility], [address] using a secure [employer] through [employer]. Patient is being seen remotely via telehealth at their home address in [place], and stated they are in a secure environment for this session. The patient's condition being diagnosed/treated is appropriate for telemedicine. The provider identified herself as well as her credentials.   The patient, and/or patients guardian, consent to be seen remotely, and when consent is given they understand that the consent allows for patient identifiable information to be sent to a third party as needed.   They may refuse to be seen remotely at any time. The electronic data is encrypted and password protected, and the patient and/or guardian has been advised of the potential risks to privacy not withstanding such measures.    You have chosen to receive care through a telehealth visit.  Do you consent to use a video/audio connection for your medical care today? Yes        Subjective   Honey Thomas is a 36 y.o. female who presents today for follow up    Chief Complaint:  ADHD and anxiety    Accompanied by: Pt was alone for duration of appointment    History of Present Illness:   Pt states that she is currently in the process of moving to her parent's house until she can move into her home. March 1st should be the closing date. Pt continues to be busy with work. Adderall is still working well for her. Pt continues to benefit from the use of a stimulant. Her ability to concentrate and focus has improved; she is not as easily distracted. Pt feels more productive in her daily life as time management skills have also improved. Anxiety is manageable, pt has the hydroxyzine if needed. Sleep is stable and appetite is stable. The patient denies any new medical problems or changes in medications since last appointment with this facility. The  patient reports compliance with current medication regimen. The patient denies any current side effects from her current medication regimen. The patient denies any abnormal muscle movements or tics. The patient would like to not adjust or change their medications at this visit. The patient denies any suicidal or homicidal ideations, plans, or intent at today's encounter and is convincing. The patient denies any auditory hallucinations or visual hallucinations. The patient does not endorse any significant symptoms consistent with lulu or psychosis at today's encounter.          Prior Psychiatric Medications:  Buspar: pt struggled to remember to take multiple times a day  Zoloft: ineffective  Wellbutrin XL: ineffective  Paxil: ineffective  Strattera: nausea, irritability        The following portions of the patient's history were reviewed and updated as appropriate: allergies, current medications, past family history, past medical history, past social history, past surgical history and problem list.          Past Medical History:  Past Medical History:   Diagnosis Date   • Anxiety    • BPPV (benign paroxysmal positional vertigo)    • Carpal tunnel syndrome 2010    bilat   • TMJ (dislocation of temporomandibular joint) 2/4/2021   • Vitamin D deficiency        Social History:  Social History     Socioeconomic History   • Marital status:    Tobacco Use   • Smoking status: Never Smoker   • Smokeless tobacco: Never Used   Substance and Sexual Activity   • Alcohol use: Yes     Comment: social   • Drug use: No   • Sexual activity: Yes     Partners: Male     Birth control/protection: Surgical       Family History:  Family History   Problem Relation Age of Onset   • Hypertension Father    • Diabetes Father    • Hypertension Mother    • Ovarian cancer Mother 37   • Graves' disease Sister    • Scoliosis Sister    • Sleep apnea Sister    • Lung cancer Maternal Grandmother    • Alzheimer's disease Paternal Grandmother    •  Other Paternal Grandfather         accident   • Autism Niece        Past Surgical History:  Past Surgical History:   Procedure Laterality Date   • ABDOMINOPLASTY N/A 2019    Procedure: ABDOMINOPLASTY;  Surgeon: Alphonso Vasquez MD;  Location:  [facility];  Service: Plastics   • BREAST AUGMENTATION Bilateral 2019    Procedure: BREAST AUGMENTATION WITH IMPLANTS BILATERAL;  Surgeon: Alphonso Vasquez MD;  Location:  [facility];  Service: Plastics   • FRACTURE SURGERY Left 1995    lEFT FOREARM   • TOTAL LAPAROSCOPIC HYSTERECTOMY SALPINGO OOPHORECTOMY N/A 2019    Procedure: TOTAL ROBOTIC HYSTERECTOMY WITH BILATERAL SALPINGO-OOPHORECTOMY;  Surgeon: Summer Dorsey DO;  Location:  [facility];  Service: DaVinci   • WISDOM TOOTH EXTRACTION         Problem List:  Patient Active Problem List   Diagnosis   •  (normal spontaneous vaginal delivery)   • Vitamin D deficiency   • Anxiety   • BPPV (benign paroxysmal positional vertigo)   • Carpal tunnel syndrome   • TMJ (dislocation of temporomandibular joint)       Allergy:   No Known Allergies     Current Medications:   Current Outpatient Medications   Medication Sig Dispense Refill   • amphetamine-dextroamphetamine (Adderall) 10 MG tablet Take 1/2-1 tablet PO every afternoon PRN 30 tablet 0   • amphetamine-dextroamphetamine XR (Adderall XR) 15 MG 24 hr capsule Take 1 capsule by mouth Every Morning for 30 days 30 capsule 0   • cyclobenzaprine (FLEXERIL) 10 MG tablet Take 1 tablet by mouth 3 (Three) Times a Day As Needed for Muscle Spasms. 30 tablet 1   • estradiol (ESTRACE) 1 MG tablet TAKE 1 TABLET BY MOUTH EVERY DAY 14 tablet 0   • hydrOXYzine (ATARAX) 25 MG tablet Take 1-2 tablets PO TID PRN for anxiety/sleep 180 tablet 0     No current facility-administered medications for this visit.       Review of Symptoms:    Review of Systems   Constitutional: Negative.    Psychiatric/Behavioral: Positive for stress.         Physical Exam:   Due to the remote nature  of this encounter (virtual encounter), vitals were unable to be obtained.  Height stated at 66 inches.  Weight stated at 184 pounds.         Physical Exam  Neurological:      Mental Status: She is alert.   Psychiatric:         Attention and Perception: Attention and perception normal. She does not perceive auditory or visual hallucinations.         Mood and Affect: Mood and affect normal.         Speech: Speech normal.         Behavior: Behavior normal. Behavior is cooperative.         Thought Content: Thought content normal. Thought content is not paranoid or delusional. Thought content does not include homicidal or suicidal ideation. Thought content does not include homicidal or suicidal plan.         Cognition and Memory: Cognition and memory normal.         Judgment: Judgment normal.           Mental Status Exam:   Hygiene:   good  Cooperation:  Cooperative  Eye Contact:  Good  Psychomotor Behavior:  Appropriate  Affect:  Appropriate  Mood: euthymic  Speech:  Normal  Thought Process:  Goal directed  Thought Content:  Normal  Suicidal:  None  Homicidal:  None  Hallucinations:  None  Delusion:  None  Memory:  Intact  Orientation:  Person, Place, Time and Situation  Reliability:  good  Insight:  Good  Judgement:  Good  Impulse Control:  Good  Physical/Medical Issues:  No          Previous Provider notes and available records reviewed by this APRN at today's encounter.         Lab Results:   No visits with results within 1 Month(s) from this visit.   Latest known visit with results is:   Telemedicine on 03/16/2021   Component Date Value Ref Range Status   • THC, Screen, Urine 03/16/2021 Negative  Negative Final   • Phencyclidine (PCP), Urine 03/16/2021 Negative  Negative Final   • Cocaine Screen, Urine 03/16/2021 Negative  Negative Final   • Methamphetamine, Ur 03/16/2021 Negative  Negative Final   • Opiate Screen 03/16/2021 Negative  Negative Final   • Amphetamine Screen, Urine 03/16/2021 Negative  Negative Final    • Benzodiazepine Screen, Urine 03/16/2021 Negative  Negative Final   • Tricyclic Antidepressants Screen 03/16/2021 Negative  Negative Final   • Methadone Screen, Urine 03/16/2021 Negative  Negative Final   • Barbiturates Screen, Urine 03/16/2021 Negative  Negative Final   • Oxycodone Screen, Urine 03/16/2021 Negative  Negative Final   • Propoxyphene Screen 03/16/2021 Negative  Negative Final   • Buprenorphine, Screen, Urine 03/16/2021 Negative  Negative Final         Assessment/Plan   Problems Addressed this Visit     None      Visit Diagnoses     Attention deficit hyperactivity disorder, combined type  (Chronic)   -  Primary    Relevant Medications    amphetamine-dextroamphetamine (Adderall) 10 MG tablet    amphetamine-dextroamphetamine XR (Adderall XR) 15 MG 24 hr capsule    Other Relevant Orders    Urine Drug Screen - Urine, Clean Catch    Anxiety disorder, unspecified type  (Chronic)       Relevant Medications    amphetamine-dextroamphetamine (Adderall) 10 MG tablet    amphetamine-dextroamphetamine XR (Adderall XR) 15 MG 24 hr capsule      Diagnoses       Codes Comments    Attention deficit hyperactivity disorder, combined type    -  Primary ICD-10-CM: F90.2  ICD-9-CM: 314.01     Anxiety disorder, unspecified type     ICD-10-CM: F41.9  ICD-9-CM: 300.00           Visit Diagnoses:    ICD-10-CM ICD-9-CM   1. Attention deficit hyperactivity disorder, combined type  F90.2 314.01   2. Anxiety disorder, unspecified type  F41.9 300.00          GOALS:  Short Term Goals: Patient will be compliant with medication, and patient will have no significant medication related side effects.  Patient will be engaged in psychotherapy as indicated.  Patient will report subjective improvement of symptoms.  Long term goals: To stabilize mood and treat/improve subjective symptoms, the patient will stay out of the hospital, the patient will be at an optimal level of functioning, and the patient will take all medications as  prescribed.  The patient verbalized understanding and agreement with goals that were mutually set.      TREATMENT PLAN:   -Continue Adderall XR 15 mg PO QAM for ADHD  -Continue Adderall 5-10 mg PO every afternoon PRN for ADHD  -Continue hydroxyzine 25-50 mg PO TID PRN for anxiety  -Obtain UDS    Medication and treatment options, both pharmacological and non-pharmacological treatment options, discussed during today's visit, including any off label use of medication. Patient acknowledged and verbally consented with current treatment plan and was educated on the importance of compliance with treatment and follow-up appointments.        MEDICATION ISSUES:    Discussed treatment plan and medication options of prescribed medication as well as the risks, benefits, any black box warnings, and side effects including potential falls, possible impaired driving, and metabolic adversities among others, including any off label use of medication. Patient is agreeable to call the office with any worsening of symptoms or onset of side effects, or if any concerns or questions arise.  The contact information for the office is made available to the patient. Patient is agreeable to call 911 or go to the nearest ER should they begin having any SI/HI, or if any urgent concerns arise. No medication side effects or related complaints today.       MEDS ORDERED DURING VISIT:  New Medications Ordered This Visit   Medications   • amphetamine-dextroamphetamine (Adderall) 10 MG tablet     Sig: Take 1/2-1 tablet PO every afternoon PRN     Dispense:  30 tablet     Refill:  0     Pt is also taking Adderall XR   • amphetamine-dextroamphetamine XR (Adderall XR) 15 MG 24 hr capsule     Sig: Take 1 capsule by mouth Every Morning for 30 days     Dispense:  30 capsule     Refill:  0       Return in about 4 weeks (around 2/28/2022), or if symptoms worsen or fail to improve, for Recheck.     Treatment plan completed on 1/31/22    Progress toward goal: Not  at goal    Functional Status: Mild impairment     Prognosis: Good with Ongoing Treatment         This document has been electronically signed by MICHEL Guerrero  January 31, 2022 16:49 EST     Some of the data in this electronic note has been brought forward from a previous encounter, any necessary changes have been made, it has been reviewed by this APRN, and it is accurate.    Please note that portions of this note were completed with a voice recognition program. Efforts were made to edit dictation, but occasionally words are mistranscribed.

## 2022-01-31 NOTE — TREATMENT PLAN
Multi-Disciplinary Problems (from Behavioral Health Treatment Plan)    Active Problems     Problem: ADHD (Adult)  Start Date: 01/31/22    Problem Details: The patient self-scales this problem as a 2 with 10 being the worst. (with medication)      Goal Priority Start Date Expected End Date End Date    Patient will sustain attention and concentration to complete chores, and work responsibilites and increase positive interaction in all relationships. -- 01/31/22 -- --    Goal Details: Progress toward goal:  The patient self-scales their progress related to this goal as a 2 with 10 being the worst.      Goal Intervention Frequency Start Date End Date    Assist patient in setting responsible goals and breaking down large tasks. Q Month 01/31/22 --    Intervention Details: Duration of treatment until until discharged.      Goal Intervention Frequency Start Date End Date    Assist patient in using self monitoring checklist to improve attention, work performance, and social skills. Q Month 01/31/22 --    Intervention Details: Duration of treatment until until discharged.                       I have discussed and reviewed this treatment plan with the patient and/or guardian.  The patient has verbally agreed with this treatment plan (no signatures are obtained at today's visit as the patient is a telehealth patient and is unable to print and sign this document, therefore verbal agreement is obtained).

## 2022-02-11 ENCOUNTER — OFFICE VISIT (OUTPATIENT)
Dept: FAMILY MEDICINE CLINIC | Facility: CLINIC | Age: 37
End: 2022-02-11

## 2022-02-11 ENCOUNTER — LAB (OUTPATIENT)
Dept: LAB | Facility: HOSPITAL | Age: 37
End: 2022-02-11

## 2022-02-11 VITALS
HEIGHT: 66 IN | DIASTOLIC BLOOD PRESSURE: 80 MMHG | RESPIRATION RATE: 16 BRPM | OXYGEN SATURATION: 99 % | HEART RATE: 88 BPM | SYSTOLIC BLOOD PRESSURE: 118 MMHG | TEMPERATURE: 98.3 F | WEIGHT: 175 LBS | BODY MASS INDEX: 28.12 KG/M2

## 2022-02-11 DIAGNOSIS — R10.13 EPIGASTRIC DISCOMFORT: ICD-10-CM

## 2022-02-11 DIAGNOSIS — Z79.890 HORMONE REPLACEMENT THERAPY: ICD-10-CM

## 2022-02-11 DIAGNOSIS — Z00.00 ANNUAL PHYSICAL EXAM: ICD-10-CM

## 2022-02-11 DIAGNOSIS — Z80.41 FAMILY HISTORY OF OVARIAN CANCER: ICD-10-CM

## 2022-02-11 DIAGNOSIS — Z00.00 ENCOUNTER FOR ANNUAL PHYSICAL EXAMINATION EXCLUDING GYNECOLOGICAL EXAMINATION IN A PATIENT OLDER THAN 17 YEARS: ICD-10-CM

## 2022-02-11 PROBLEM — Z77.120 MOLD EXPOSURE: Status: ACTIVE | Noted: 2022-02-11

## 2022-02-11 LAB
25(OH)D3 SERPL-MCNC: 29.2 NG/ML (ref 30–100)
ALBUMIN SERPL-MCNC: 4.8 G/DL (ref 3.5–5.2)
ALBUMIN/GLOB SERPL: 1.8 G/DL
ALP SERPL-CCNC: 102 U/L (ref 39–117)
ALT SERPL W P-5'-P-CCNC: 60 U/L (ref 1–33)
ANION GAP SERPL CALCULATED.3IONS-SCNC: 10 MMOL/L (ref 5–15)
AST SERPL-CCNC: 35 U/L (ref 1–32)
BASOPHILS # BLD AUTO: 0.06 10*3/MM3 (ref 0–0.2)
BASOPHILS NFR BLD AUTO: 1 % (ref 0–1.5)
BILIRUB SERPL-MCNC: 0.4 MG/DL (ref 0–1.2)
BILIRUB UR QL STRIP: NEGATIVE
BUN SERPL-MCNC: 12 MG/DL (ref 6–20)
BUN/CREAT SERPL: 15.8 (ref 7–25)
CALCIUM SPEC-SCNC: 9.6 MG/DL (ref 8.6–10.5)
CANCER AG125 SERPL QL: 7.5 U/ML (ref 0–38.1)
CHLORIDE SERPL-SCNC: 104 MMOL/L (ref 98–107)
CHOLEST SERPL-MCNC: 197 MG/DL (ref 0–200)
CLARITY UR: CLEAR
CO2 SERPL-SCNC: 26 MMOL/L (ref 22–29)
COLOR UR: YELLOW
CREAT SERPL-MCNC: 0.76 MG/DL (ref 0.57–1)
DEPRECATED RDW RBC AUTO: 40.1 FL (ref 37–54)
EOSINOPHIL # BLD AUTO: 0.13 10*3/MM3 (ref 0–0.4)
EOSINOPHIL NFR BLD AUTO: 2.1 % (ref 0.3–6.2)
ERYTHROCYTE [DISTWIDTH] IN BLOOD BY AUTOMATED COUNT: 12.2 % (ref 12.3–15.4)
GFR SERPL CREATININE-BSD FRML MDRD: 85 ML/MIN/1.73
GLOBULIN UR ELPH-MCNC: 2.7 GM/DL
GLUCOSE SERPL-MCNC: 86 MG/DL (ref 65–99)
GLUCOSE UR STRIP-MCNC: NEGATIVE MG/DL
HBA1C MFR BLD: 5.4 % (ref 4.8–5.6)
HCT VFR BLD AUTO: 44.2 % (ref 34–46.6)
HDLC SERPL-MCNC: 59 MG/DL (ref 40–60)
HGB BLD-MCNC: 15.3 G/DL (ref 12–15.9)
HGB UR QL STRIP.AUTO: NEGATIVE
IMM GRANULOCYTES # BLD AUTO: 0 10*3/MM3 (ref 0–0.05)
IMM GRANULOCYTES NFR BLD AUTO: 0 % (ref 0–0.5)
KETONES UR QL STRIP: NEGATIVE
LDLC SERPL CALC-MCNC: 125 MG/DL (ref 0–100)
LDLC/HDLC SERPL: 2.09 {RATIO}
LEUKOCYTE ESTERASE UR QL STRIP.AUTO: NEGATIVE
LYMPHOCYTES # BLD AUTO: 1.88 10*3/MM3 (ref 0.7–3.1)
LYMPHOCYTES NFR BLD AUTO: 30.1 % (ref 19.6–45.3)
MCH RBC QN AUTO: 31.4 PG (ref 26.6–33)
MCHC RBC AUTO-ENTMCNC: 34.6 G/DL (ref 31.5–35.7)
MCV RBC AUTO: 90.8 FL (ref 79–97)
MONOCYTES # BLD AUTO: 0.57 10*3/MM3 (ref 0.1–0.9)
MONOCYTES NFR BLD AUTO: 9.1 % (ref 5–12)
NEUTROPHILS NFR BLD AUTO: 3.6 10*3/MM3 (ref 1.7–7)
NEUTROPHILS NFR BLD AUTO: 57.7 % (ref 42.7–76)
NITRITE UR QL STRIP: NEGATIVE
NRBC BLD AUTO-RTO: 0 /100 WBC (ref 0–0.2)
PH UR STRIP.AUTO: 6.5 [PH] (ref 5–8)
PLATELET # BLD AUTO: 267 10*3/MM3 (ref 140–450)
PMV BLD AUTO: 11 FL (ref 6–12)
POTASSIUM SERPL-SCNC: 4.7 MMOL/L (ref 3.5–5.2)
PROT SERPL-MCNC: 7.5 G/DL (ref 6–8.5)
PROT UR QL STRIP: NEGATIVE
RBC # BLD AUTO: 4.87 10*6/MM3 (ref 3.77–5.28)
SODIUM SERPL-SCNC: 140 MMOL/L (ref 136–145)
SP GR UR STRIP: 1.01 (ref 1–1.03)
T4 SERPL-MCNC: 8.86 MCG/DL (ref 4.5–11.7)
TRIGL SERPL-MCNC: 74 MG/DL (ref 0–150)
TSH SERPL DL<=0.05 MIU/L-ACNC: 1.07 UIU/ML (ref 0.27–4.2)
UROBILINOGEN UR QL STRIP: NORMAL
VLDLC SERPL-MCNC: 13 MG/DL (ref 5–40)
WBC NRBC COR # BLD: 6.24 10*3/MM3 (ref 3.4–10.8)

## 2022-02-11 PROCEDURE — 80061 LIPID PANEL: CPT

## 2022-02-11 PROCEDURE — 84443 ASSAY THYROID STIM HORMONE: CPT

## 2022-02-11 PROCEDURE — 85025 COMPLETE CBC W/AUTO DIFF WBC: CPT

## 2022-02-11 PROCEDURE — 84436 ASSAY OF TOTAL THYROXINE: CPT

## 2022-02-11 PROCEDURE — 80053 COMPREHEN METABOLIC PANEL: CPT

## 2022-02-11 PROCEDURE — 82306 VITAMIN D 25 HYDROXY: CPT

## 2022-02-11 PROCEDURE — 83036 HEMOGLOBIN GLYCOSYLATED A1C: CPT

## 2022-02-11 PROCEDURE — 99395 PREV VISIT EST AGE 18-39: CPT | Performed by: NURSE PRACTITIONER

## 2022-02-11 PROCEDURE — 93000 ELECTROCARDIOGRAM COMPLETE: CPT | Performed by: NURSE PRACTITIONER

## 2022-02-11 PROCEDURE — 86304 IMMUNOASSAY TUMOR CA 125: CPT

## 2022-02-11 PROCEDURE — 80306 DRUG TEST PRSMV INSTRMNT: CPT | Performed by: NURSE PRACTITIONER

## 2022-02-11 PROCEDURE — 81003 URINALYSIS AUTO W/O SCOPE: CPT

## 2022-02-11 RX ORDER — OMEPRAZOLE 20 MG/1
20 CAPSULE, DELAYED RELEASE ORAL DAILY
Qty: 90 CAPSULE | Refills: 3 | Status: SHIPPED | OUTPATIENT
Start: 2022-02-11

## 2022-02-11 NOTE — PROGRESS NOTES
Chief Complaint  Annual Exam and Allergies (possible mold allergie. experienced headaches, muscle aches in apatment with mold.)    Subjective          Honey Thomas presents to [facility]  Patient is a 35 yo female. She is here for annual exam.   She was having HA, epigastric pain. Patient had to move into a rental apartment for the past 9 months due to building a new home that has not been completed. When they recently moved she discovered a large amount of mold around the windows, down the wall behind her dresser, on her dresser and under her bed.   She had been waking up daily with headaches and occasional epigastric pain going through to her back. Since moving a few weeks ago. Her symptoms have resolved. It was worse at night.    Denied shortness of breath, cough, heart pain, arm pain or any diaphoresis.   It was worse at night.     Patient states she had prophylactic hysterectomy complete due to family hx of ovarian cancer.  Dental exam; up to date  Eye exam: recommend   GYN: has appointment later in the Summer.   Immunizations: up to date.       The following portions of the patient's history were reviewed and updated as appropriate: allergies, current medications, past family history, past medical history, past social history, past surgical history and problem list.    Review of Systems   Constitutional: Negative.    Eyes: Negative.    Respiratory: Negative.         Epigastric pain through to back    Cardiovascular: Negative for chest pain, palpitations and leg swelling.   Gastrointestinal: Negative.         Epigastric    Genitourinary: Negative.    Musculoskeletal: Positive for myalgias.   Skin: Negative.    Allergic/Immunologic: Positive for environmental allergies.   Neurological: Positive for headaches.   Hematological: Negative.    Psychiatric/Behavioral: Negative.          Objective   Vital Signs:   /80   Pulse 88   Temp 98.3 °F (36.8 °C)   Resp 16   Ht  "167.6 cm (66\")   Wt 79.4 kg (175 lb)   SpO2 99%   BMI 28.25 kg/m²     Physical Exam  Vitals reviewed.   Constitutional:       Appearance: She is well-developed. She is not ill-appearing.   HENT:      Head: Normocephalic.      Right Ear: Tympanic membrane, ear canal and external ear normal.      Left Ear: Tympanic membrane, ear canal and external ear normal.      Nose: Nose normal.      Mouth/Throat:      Mouth: Mucous membranes are moist.   Eyes:      Conjunctiva/sclera: Conjunctivae normal.      Pupils: Pupils are equal, round, and reactive to light.   Cardiovascular:      Rate and Rhythm: Normal rate and regular rhythm.      Heart sounds: Normal heart sounds.   Pulmonary:      Effort: Pulmonary effort is normal.      Breath sounds: Normal breath sounds.   Abdominal:      General: Bowel sounds are normal.      Palpations: Abdomen is soft.   Musculoskeletal:         General: Normal range of motion.      Cervical back: Normal range of motion.   Lymphadenopathy:      Cervical: No cervical adenopathy.   Skin:     General: Skin is warm and dry.      Capillary Refill: Capillary refill takes less than 2 seconds.   Neurological:      Mental Status: She is alert and oriented to person, place, and time.   Psychiatric:         Mood and Affect: Mood normal.         Speech: Speech normal.         Behavior: Behavior normal. Behavior is cooperative.         Thought Content: Thought content normal.         Judgment: Judgment normal.        Result Review :            ECG 12 Lead    Date/Time: 2/11/2022 8:54 AM  Performed by: Vinita Love APRN  Authorized by: Vinita Love APRN   Comparison: not compared with previous ECG   Previous ECG: no previous ECG available  Rhythm: sinus rhythm  Rate: normal  BPM: 67  Other: no other findings    Clinical impression: normal ECG  Comments: See scanned EKG  Vent rate:     67 BPM  MT int       180 ms  QRS dur:    85 ms  QT/QTC:     404/420 ms  P-R-T axesL   56    82    70               "   Assessment and Plan    Diagnoses and all orders for this visit:    1. Encounter for annual physical examination excluding gynecological examination in a patient older than 17 years  -     CBC & Differential; Future  -     Comprehensive Metabolic Panel; Future  -     Hemoglobin A1c; Future  -     Lipid Panel; Future  -     Vitamin D 25 Hydroxy; Future  -     TSH; Future  -     T4; Future  -     Urinalysis With Culture If Indicated -; Future    2. Hormone replacement therapy  -     ; Future    3. Epigastric discomfort  -     ECG 12 Lead  -     omeprazole (PrilOSEC) 20 MG capsule; Take 1 capsule by mouth Daily.  Dispense: 90 capsule; Refill: 3    4. Family history of ovarian cancer  -     ; Future    start omeprazole.   Follow up if no improvement  EKG SR    Patient Counseling:  --Nutrition: Stressed importance of moderation in sodium/caffeine intake, saturated fat and cholesterol, caloric balance, sufficient intake of fresh fruits, vegetables, fiber, calcium, iron,--Exercise: Stressed the importance of regular exercise.   --Substance Abuse: Discussed cessation/primary prevention of tobacco, alcohol, or other drug use; driving or other dangerous activities under the influence; availability of treatment for abuse.    --Sexuality: Discussed sexually transmitted diseases, partner selection, use of condoms, avoidance of unintended pregnancy and contraceptive alternatives.   --Injury prevention: Discussed safety belts, safety helmets, smoke detector, smoking near bedding or upholstery.   --Dental health: Discussed importance of regular tooth brushing, flossing, and dental visits.  --Immunizations reviewed. Up to date  --Discussed benefits of self breast exam monthly   --After hours’ service discussed with patient     Discussed the nature of the medical condition(s) risks, complications, implications, and management, safe and proper use of medications. Encouraged medication compliance, and keeping scheduled  follow up appointments with me and any other providers.       Follow Up   Return in about 4 weeks (around 3/11/2022), or if symptoms worsen or fail to improve, for Recheck.  Patient was given instructions and counseling regarding her condition or for health maintenance advice. Please see specific information pulled into the AVS if appropriate.

## 2022-02-12 LAB
AMPHET+METHAMPHET UR QL: POSITIVE
AMPHETAMINES UR QL: NEGATIVE
BARBITURATES UR QL SCN: NEGATIVE
BENZODIAZ UR QL SCN: NEGATIVE
BUPRENORPHINE SERPL-MCNC: NEGATIVE NG/ML
CANNABINOIDS SERPL QL: NEGATIVE
COCAINE UR QL: NEGATIVE
METHADONE UR QL SCN: NEGATIVE
OPIATES UR QL: NEGATIVE
OXYCODONE UR QL SCN: NEGATIVE
PCP UR QL SCN: NEGATIVE
PROPOXYPH UR QL: NEGATIVE
TRICYCLICS UR QL SCN: NEGATIVE

## 2022-03-08 ENCOUNTER — TELEMEDICINE (OUTPATIENT)
Dept: PSYCHIATRY | Facility: CLINIC | Age: 37
End: 2022-03-08

## 2022-03-08 DIAGNOSIS — F90.2 ATTENTION DEFICIT HYPERACTIVITY DISORDER, COMBINED TYPE: Primary | Chronic | ICD-10-CM

## 2022-03-08 PROCEDURE — 99213 OFFICE O/P EST LOW 20 MIN: CPT | Performed by: NURSE PRACTITIONER

## 2022-03-08 RX ORDER — DEXTROAMPHETAMINE SACCHARATE, AMPHETAMINE ASPARTATE MONOHYDRATE, DEXTROAMPHETAMINE SULFATE AND AMPHETAMINE SULFATE 3.75; 3.75; 3.75; 3.75 MG/1; MG/1; MG/1; MG/1
15 CAPSULE, EXTENDED RELEASE ORAL EVERY MORNING
Qty: 30 CAPSULE | Refills: 0 | Status: SHIPPED | OUTPATIENT
Start: 2022-03-08 | End: 2022-04-06 | Stop reason: SDUPTHER

## 2022-03-08 RX ORDER — DEXTROAMPHETAMINE SACCHARATE, AMPHETAMINE ASPARTATE, DEXTROAMPHETAMINE SULFATE AND AMPHETAMINE SULFATE 2.5; 2.5; 2.5; 2.5 MG/1; MG/1; MG/1; MG/1
TABLET ORAL
Qty: 30 TABLET | Refills: 0 | Status: SHIPPED | OUTPATIENT
Start: 2022-03-08 | End: 2022-04-06 | Stop reason: SDUPTHER

## 2022-03-08 NOTE — PROGRESS NOTES
"This provider is located at the [facility] (through [facility]), [address], [facility], [address] using a secure [employer] Video Visit through [employer]. Patient is being seen remotely via telehealth at their home address in [place], and stated they are in a secure environment for this session. The patient's condition being diagnosed/treated is appropriate for telemedicine. The provider identified herself as well as her credentials.   The patient, and/or patients guardian, consent to be seen remotely, and when consent is given they understand that the consent allows for patient identifiable information to be sent to a third party as needed.   They may refuse to be seen remotely at any time. The electronic data is encrypted and password protected, and the patient and/or guardian has been advised of the potential risks to privacy not withstanding such measures.    You have chosen to receive care through a telehealth visit.  Do you consent to use a video/audio connection for your medical care today? Yes        Subjective   Honey Thomas is a 36 y.o. female who presents today for follow up    Chief Complaint:  ADHD and anxiety    Accompanied by: Pt was alone for duration of appointment    History of Present Illness:   Pt reports that she has been stressed. The disorganization in her new house is \"overwhelming\". Pt and her  closed on it last week. They are working on painting some of the rooms. Pt continues to work her full-time job. 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 Sleep has been stable. Pt uses the hydroxyzine on occasion. Pt continues to do well on the Adderall XR and Adderall PRN. It is helping with focus and concentration. She needs it for a better quality of life. Appetite is stable. The patient denies any new medical problems or changes in medications since last appointment with this facility. The patient reports " compliance with current medication regimen. The patient denies any current side effects from her current medication regimen. The patient denies any abnormal muscle movements or tics. The patient would like to not adjust or change their medications at this visit. The patient denies any suicidal or homicidal ideations, plans, or intent at today's encounter and is convincing.  The patient denies any auditory hallucinations or visual hallucinations. The patient does not endorse any significant symptoms consistent with lulu or psychosis at today's encounter.        Prior Psychiatric Medications:  Buspar: pt struggled to remember to take multiple times a day  Zoloft: ineffective  Wellbutrin XL: ineffective  Paxil: ineffective  Strattera: nausea, irritability        The following portions of the patient's history were reviewed and updated as appropriate: allergies, current medications, past family history, past medical history, past social history, past surgical history and problem list.          Past Medical History:  Past Medical History:   Diagnosis Date   • Allergic    • Anxiety    • BPPV (benign paroxysmal positional vertigo)    • Carpal tunnel syndrome 2010    bilat   • TMJ (dislocation of temporomandibular joint) 2/4/2021   • Vitamin D deficiency        Social History:  Social History     Socioeconomic History   • Marital status:    Tobacco Use   • Smoking status: Never Smoker   • Smokeless tobacco: Never Used   Substance and Sexual Activity   • Alcohol use: Yes     Comment: social   • Drug use: No   • Sexual activity: Yes     Partners: Male     Birth control/protection: Surgical       Family History:  Family History   Problem Relation Age of Onset   • Hypertension Father    • Diabetes Father    • Hypertension Mother    • Ovarian cancer Mother 37   • Graves' disease Sister    • Scoliosis Sister    • Sleep apnea Sister    • Lung cancer Maternal Grandmother    • Alzheimer's disease Paternal Grandmother    •  Other Paternal Grandfather         accident   • Autism Niece        Past Surgical History:  Past Surgical History:   Procedure Laterality Date   • ABDOMINOPLASTY N/A 2019    Procedure: ABDOMINOPLASTY;  Surgeon: Alphonso Vasquez MD;  Location:  [facility];  Service: Plastics   • BREAST AUGMENTATION Bilateral 2019    Procedure: BREAST AUGMENTATION WITH IMPLANTS BILATERAL;  Surgeon: Alphonso Vasquez MD;  Location:  [facility];  Service: Plastics   • FRACTURE SURGERY Left 1995    lEFT FOREARM   • TOTAL LAPAROSCOPIC HYSTERECTOMY SALPINGO OOPHORECTOMY N/A 2019    Procedure: TOTAL ROBOTIC HYSTERECTOMY WITH BILATERAL SALPINGO-OOPHORECTOMY;  Surgeon: Summer Dorsey DO;  Location:  [facility];  Service: DaVinci   • WISDOM TOOTH EXTRACTION         Problem List:  Patient Active Problem List   Diagnosis   •  (normal spontaneous vaginal delivery)   • Vitamin D deficiency   • Anxiety   • BPPV (benign paroxysmal positional vertigo)   • Carpal tunnel syndrome   • TMJ (dislocation of temporomandibular joint)   • Family history of ovarian cancer   • Mold exposure       Allergy:   No Known Allergies     Current Medications:   Current Outpatient Medications   Medication Sig Dispense Refill   • amphetamine-dextroamphetamine (Adderall) 10 MG tablet Take 1/2-1 tablet PO every afternoon PRN 30 tablet 0   • amphetamine-dextroamphetamine XR (Adderall XR) 15 MG 24 hr capsule Take 1 capsule by mouth Every Morning 30 capsule 0   • cyclobenzaprine (FLEXERIL) 10 MG tablet Take 1 tablet by mouth 3 (Three) Times a Day As Needed for Muscle Spasms. 30 tablet 1   • estradiol (ESTRACE) 1 MG tablet TAKE 1 TABLET BY MOUTH EVERY DAY 14 tablet 0   • hydrOXYzine (ATARAX) 25 MG tablet Take 1-2 tablets PO TID PRN for anxiety/sleep 180 tablet 0   • omeprazole (PrilOSEC) 20 MG capsule Take 1 capsule by mouth Daily. 90 capsule 3     No current facility-administered medications for this visit.       Review of Symptoms:    Review of Systems    Constitutional: Negative.    Psychiatric/Behavioral: Positive for stress.         Physical Exam:   Due to the remote nature of this encounter (virtual encounter), vitals were unable to be obtained.  Height stated at 66 inches.  Weight stated at 175 pounds.         Physical Exam  Neurological:      Mental Status: She is alert.   Psychiatric:         Attention and Perception: Attention and perception normal. She does not perceive auditory or visual hallucinations.         Mood and Affect: Mood and affect normal.         Speech: Speech normal.         Behavior: Behavior normal. Behavior is cooperative.         Thought Content: Thought content normal. Thought content is not paranoid or delusional. Thought content does not include homicidal or suicidal ideation. Thought content does not include homicidal or suicidal plan.         Cognition and Memory: Cognition and memory normal.         Judgment: Judgment normal.           Mental Status Exam:   Hygiene:   good  Cooperation:  Cooperative  Eye Contact:  Good  Psychomotor Behavior:  Appropriate  Affect:  Appropriate  Mood: euthymic  Speech:  Normal  Thought Process:  Goal directed  Thought Content:  Normal  Suicidal:  None  Homicidal:  None  Hallucinations:  None  Delusion:  None  Memory:  Intact  Orientation:  Person, Place, Time and Situation  Reliability:  good  Insight:  Good  Judgement:  Good  Impulse Control:  Good  Physical/Medical Issues:  No        [facility] request number [id number] reviewed by this APRN at today's encounter.    Previous Provider notes and available records reviewed by this APRN at today's encounter.         Lab Results:   Lab on 02/11/2022   Component Date Value Ref Range Status   •  02/11/2022 7.5  0.0 - 38.1 U/mL Final   • Glucose 02/11/2022 86  65 - 99 mg/dL Final   • BUN 02/11/2022 12  6 - 20 mg/dL Final   • Creatinine 02/11/2022 0.76  0.57 - 1.00 mg/dL Final   • Sodium 02/11/2022 140  136 - 145 mmol/L Final   • Potassium 02/11/2022 4.7   3.5 - 5.2 mmol/L Final   • Chloride 02/11/2022 104  98 - 107 mmol/L Final   • CO2 02/11/2022 26.0  22.0 - 29.0 mmol/L Final   • Calcium 02/11/2022 9.6  8.6 - 10.5 mg/dL Final   • Total Protein 02/11/2022 7.5  6.0 - 8.5 g/dL Final   • Albumin 02/11/2022 4.80  3.50 - 5.20 g/dL Final   • ALT (SGPT) 02/11/2022 60 (A) 1 - 33 U/L Final   • AST (SGOT) 02/11/2022 35 (A) 1 - 32 U/L Final   • Alkaline Phosphatase 02/11/2022 102  39 - 117 U/L Final   • Total Bilirubin 02/11/2022 0.4  0.0 - 1.2 mg/dL Final   • eGFR Non  Amer 02/11/2022 85  >60 mL/min/1.73 Final   • Globulin 02/11/2022 2.7  gm/dL Final   • A/G Ratio 02/11/2022 1.8  g/dL Final   • BUN/Creatinine Ratio 02/11/2022 15.8  7.0 - 25.0 Final   • Anion Gap 02/11/2022 10.0  5.0 - 15.0 mmol/L Final   • Hemoglobin A1C 02/11/2022 5.40  4.80 - 5.60 % Final   • Total Cholesterol 02/11/2022 197  0 - 200 mg/dL Final   • Triglycerides 02/11/2022 74  0 - 150 mg/dL Final   • HDL Cholesterol 02/11/2022 59  40 - 60 mg/dL Final   • LDL Cholesterol  02/11/2022 125 (A) 0 - 100 mg/dL Final   • VLDL Cholesterol 02/11/2022 13  5 - 40 mg/dL Final   • LDL/HDL Ratio 02/11/2022 2.09   Final   • 25 Hydroxy, Vitamin D 02/11/2022 29.2 (A) 30.0 - 100.0 ng/ml Final   • TSH 02/11/2022 1.070  0.270 - 4.200 uIU/mL Final   • T4, Total 02/11/2022 8.86  4.50 - 11.70 mcg/dL Final   • Color, UA 02/11/2022 Yellow  Yellow, Straw Final   • Appearance, UA 02/11/2022 Clear  Clear Final   • pH, UA 02/11/2022 6.5  5.0 - 8.0 Final   • Specific Gravity, UA 02/11/2022 1.013  1.005 - 1.030 Final   • Glucose, UA 02/11/2022 Negative  Negative Final   • Ketones, UA 02/11/2022 Negative  Negative Final   • Bilirubin, UA 02/11/2022 Negative  Negative Final   • Blood, UA 02/11/2022 Negative  Negative Final   • Protein, UA 02/11/2022 Negative  Negative Final   • Leuk Esterase, UA 02/11/2022 Negative  Negative Final   • Nitrite, UA 02/11/2022 Negative  Negative Final   • Urobilinogen, UA 02/11/2022 0.2 E.U./dL  0.2 -  1.0 E.U./dL Final   • WBC 02/11/2022 6.24  3.40 - 10.80 10*3/mm3 Final   • RBC 02/11/2022 4.87  3.77 - 5.28 10*6/mm3 Final   • Hemoglobin 02/11/2022 15.3  12.0 - 15.9 g/dL Final   • Hematocrit 02/11/2022 44.2  34.0 - 46.6 % Final   • MCV 02/11/2022 90.8  79.0 - 97.0 fL Final   • MCH 02/11/2022 31.4  26.6 - 33.0 pg Final   • MCHC 02/11/2022 34.6  31.5 - 35.7 g/dL Final   • RDW 02/11/2022 12.2 (A) 12.3 - 15.4 % Final   • RDW-SD 02/11/2022 40.1  37.0 - 54.0 fl Final   • MPV 02/11/2022 11.0  6.0 - 12.0 fL Final   • Platelets 02/11/2022 267  140 - 450 10*3/mm3 Final   • Neutrophil % 02/11/2022 57.7  42.7 - 76.0 % Final   • Lymphocyte % 02/11/2022 30.1  19.6 - 45.3 % Final   • Monocyte % 02/11/2022 9.1  5.0 - 12.0 % Final   • Eosinophil % 02/11/2022 2.1  0.3 - 6.2 % Final   • Basophil % 02/11/2022 1.0  0.0 - 1.5 % Final   • Immature Grans % 02/11/2022 0.0  0.0 - 0.5 % Final   • Neutrophils, Absolute 02/11/2022 3.60  1.70 - 7.00 10*3/mm3 Final   • Lymphocytes, Absolute 02/11/2022 1.88  0.70 - 3.10 10*3/mm3 Final   • Monocytes, Absolute 02/11/2022 0.57  0.10 - 0.90 10*3/mm3 Final   • Eosinophils, Absolute 02/11/2022 0.13  0.00 - 0.40 10*3/mm3 Final   • Basophils, Absolute 02/11/2022 0.06  0.00 - 0.20 10*3/mm3 Final   • Immature Grans, Absolute 02/11/2022 0.00  0.00 - 0.05 10*3/mm3 Final   • nRBC 02/11/2022 0.0  0.0 - 0.2 /100 WBC Final         Assessment/Plan   Problems Addressed this Visit    None     Visit Diagnoses     Attention deficit hyperactivity disorder, combined type  (Chronic)   -  Primary    Relevant Medications    amphetamine-dextroamphetamine XR (Adderall XR) 15 MG 24 hr capsule    amphetamine-dextroamphetamine (Adderall) 10 MG tablet      Diagnoses       Codes Comments    Attention deficit hyperactivity disorder, combined type    -  Primary ICD-10-CM: F90.2  ICD-9-CM: 314.01           Visit Diagnoses:    ICD-10-CM ICD-9-CM   1. Attention deficit hyperactivity disorder, combined type  F90.2 314.01           GOALS:  Short Term Goals: Patient will be compliant with medication, and patient will have no significant medication related side effects.  Patient will be engaged in psychotherapy as indicated.  Patient will report subjective improvement of symptoms.  Long term goals: To stabilize mood and treat/improve subjective symptoms, the patient will stay out of the hospital, the patient will be at an optimal level of functioning, and the patient will take all medications as prescribed.  The patient verbalized understanding and agreement with goals that were mutually set.      TREATMENT PLAN:   -Continue Adderall XR 15 mg PO QAM for ADHD  -Continue Adderall 5-10 mg PO every afternoon PRN for ADHD  -Continue hydroxyzine 25-50 mg PO TID PRN for anxiety    Medication and treatment options, both pharmacological and non-pharmacological treatment options, discussed during today's visit, including any off label use of medication. Patient acknowledged and verbally consented with current treatment plan and was educated on the importance of compliance with treatment and follow-up appointments.        MEDICATION ISSUES:    Discussed treatment plan and medication options of prescribed medication as well as the risks, benefits, any black box warnings, and side effects including potential falls, possible impaired driving, and metabolic adversities among others, including any off label use of medication. Patient is agreeable to call the office with any worsening of symptoms or onset of side effects, or if any concerns or questions arise.  The contact information for the office is made available to the patient. Patient is agreeable to call 911 or go to the nearest ER should they begin having any SI/HI, or if any urgent concerns arise. No medication side effects or related complaints today.       MEDS ORDERED DURING VISIT:  New Medications Ordered This Visit   Medications   • amphetamine-dextroamphetamine XR (Adderall XR) 15 MG 24 hr  capsule     Sig: Take 1 capsule by mouth Every Morning     Dispense:  30 capsule     Refill:  0   • amphetamine-dextroamphetamine (Adderall) 10 MG tablet     Sig: Take 1/2-1 tablet PO every afternoon PRN     Dispense:  30 tablet     Refill:  0     Pt is also taking Adderall XR       Return in about 4 weeks (around 4/5/2022), or if symptoms worsen or fail to improve, for Recheck.     Treatment plan completed on 1/31/22    Progress toward goal: Not at goal    Functional Status: Mild impairment     Prognosis: Good with Ongoing Treatment         This document has been electronically signed by MICHEL Guerrero  March 8, 2022 16:41 EST     Some of the data in this electronic note has been brought forward from a previous encounter, any necessary changes have been made, it has been reviewed by this APRN, and it is accurate.    Please note that portions of this note were completed with a voice recognition program. Efforts were made to edit dictation, but occasionally words are mistranscribed.

## 2022-04-06 ENCOUNTER — TELEMEDICINE (OUTPATIENT)
Dept: PSYCHIATRY | Facility: CLINIC | Age: 37
End: 2022-04-06

## 2022-04-06 DIAGNOSIS — F90.2 ATTENTION DEFICIT HYPERACTIVITY DISORDER, COMBINED TYPE: Primary | Chronic | ICD-10-CM

## 2022-04-06 PROCEDURE — 99213 OFFICE O/P EST LOW 20 MIN: CPT | Performed by: NURSE PRACTITIONER

## 2022-04-06 RX ORDER — DEXTROAMPHETAMINE SACCHARATE, AMPHETAMINE ASPARTATE MONOHYDRATE, DEXTROAMPHETAMINE SULFATE AND AMPHETAMINE SULFATE 3.75; 3.75; 3.75; 3.75 MG/1; MG/1; MG/1; MG/1
15 CAPSULE, EXTENDED RELEASE ORAL EVERY MORNING
Qty: 30 CAPSULE | Refills: 0 | Status: SHIPPED | OUTPATIENT
Start: 2022-04-06 | End: 2022-05-04 | Stop reason: SDUPTHER

## 2022-04-06 RX ORDER — DEXTROAMPHETAMINE SACCHARATE, AMPHETAMINE ASPARTATE, DEXTROAMPHETAMINE SULFATE AND AMPHETAMINE SULFATE 2.5; 2.5; 2.5; 2.5 MG/1; MG/1; MG/1; MG/1
TABLET ORAL
Qty: 30 TABLET | Refills: 0 | Status: SHIPPED | OUTPATIENT
Start: 2022-04-06 | End: 2022-05-04 | Stop reason: SDUPTHER

## 2022-04-06 NOTE — PROGRESS NOTES
"This provider is located at the [facility] (through [facility]), [address], [place], [address] using a secure [employer] Video Visit through [employer]. Patient is being seen remotely via telehealth at their home address in [place], and stated they are in a secure environment for this session. The patient's condition being diagnosed/treated is appropriate for telemedicine. The provider identified herself as well as her credentials.   The patient, and/or patients guardian, consent to be seen remotely, and when consent is given they understand that the consent allows for patient identifiable information to be sent to a third party as needed.   They may refuse to be seen remotely at any time. The electronic data is encrypted and password protected, and the patient and/or guardian has been advised of the potential risks to privacy not withstanding such measures.    You have chosen to receive care through a telehealth visit.  Do you consent to use a video/audio connection for your medical care today? Yes        Subjective   Honey Thomas is a 36 y.o. female who presents today for follow up    Chief Complaint:  ADHD     Accompanied by: Pt was alone for duration of appointment    History of Present Illness:   Pt reports that she is doing well overall. Pt is doing a lot of \"juggling\". Now that the house is fairly complete, she has the stress of the pool to deal with. Pt has been needing to take the PRN dose of Adderall in the afternoons. She has a lot of tasks to complete in the evenings after work. She tried not taking it but then found herself not finishing any tasks she started. Pt continues to benefit from the use of a stimulant. Her ability to concentrate and focus has improved; she is not as easily distracted. Pt feels more productive in her daily life as time management skills have also improved. Mood as is appetite and sleep. The patient denies any new medical " problems or changes in medications since last appointment with this facility. The patient reports compliance with current medication regimen. The patient denies any current side effects from her current medication regimen. The patient denies any abnormal muscle movements or tics. The patient would like to not adjust or change their medications at this visit. The patient denies any suicidal or homicidal ideations, plans, or intent at today's encounter and is convincing. The patient denies any auditory hallucinations or visual hallucinations. The patient does not endorse any significant symptoms consistent with lulu or psychosis at today's encounter.          Prior Psychiatric Medications:  Buspar: pt struggled to remember to take multiple times a day  Zoloft: ineffective  Wellbutrin XL: ineffective  Paxil: ineffective  Strattera: nausea, irritability        The following portions of the patient's history were reviewed and updated as appropriate: allergies, current medications, past family history, past medical history, past social history, past surgical history and problem list.          Past Medical History:  Past Medical History:   Diagnosis Date   • Allergic    • Anxiety    • BPPV (benign paroxysmal positional vertigo)    • Carpal tunnel syndrome 2010    bilat   • TMJ (dislocation of temporomandibular joint) 2/4/2021   • Vitamin D deficiency        Social History:  Social History     Socioeconomic History   • Marital status:    Tobacco Use   • Smoking status: Never Smoker   • Smokeless tobacco: Never Used   Substance and Sexual Activity   • Alcohol use: Yes     Comment: social   • Drug use: No   • Sexual activity: Yes     Partners: Male     Birth control/protection: Surgical       Family History:  Family History   Problem Relation Age of Onset   • Hypertension Father    • Diabetes Father    • Hypertension Mother    • Ovarian cancer Mother 37   • Graves' disease Sister    • Scoliosis Sister    • Sleep apnea  Sister    • Lung cancer Maternal Grandmother    • Alzheimer's disease Paternal Grandmother    • Other Paternal Grandfather         accident   • Autism Niece        Past Surgical History:  Past Surgical History:   Procedure Laterality Date   • ABDOMINOPLASTY N/A 2019    Procedure: ABDOMINOPLASTY;  Surgeon: Alphonso Vasquez MD;  Location:  [facility];  Service: Plastics   • BREAST AUGMENTATION Bilateral 2019    Procedure: BREAST AUGMENTATION WITH IMPLANTS BILATERAL;  Surgeon: Alphonso Vasquez MD;  Location:  [facility];  Service: Plastics   • FRACTURE SURGERY Left 1995    lEFT FOREARM   • TOTAL LAPAROSCOPIC HYSTERECTOMY SALPINGO OOPHORECTOMY N/A 2019    Procedure: TOTAL ROBOTIC HYSTERECTOMY WITH BILATERAL SALPINGO-OOPHORECTOMY;  Surgeon: Summer Dorsey DO;  Location:  [facility];  Service: DaVinci   • WISDOM TOOTH EXTRACTION         Problem List:  Patient Active Problem List   Diagnosis   •  (normal spontaneous vaginal delivery)   • Vitamin D deficiency   • Anxiety   • BPPV (benign paroxysmal positional vertigo)   • Carpal tunnel syndrome   • TMJ (dislocation of temporomandibular joint)   • Family history of ovarian cancer   • Mold exposure       Allergy:   No Known Allergies     Current Medications:   Current Outpatient Medications   Medication Sig Dispense Refill   • amphetamine-dextroamphetamine (Adderall) 10 MG tablet Take 1/2-1 tablet PO every afternoon PRN 30 tablet 0   • amphetamine-dextroamphetamine XR (Adderall XR) 15 MG 24 hr capsule Take 1 capsule by mouth Every Morning 30 capsule 0   • cyclobenzaprine (FLEXERIL) 10 MG tablet Take 1 tablet by mouth 3 (Three) Times a Day As Needed for Muscle Spasms. 30 tablet 1   • estradiol (ESTRACE) 1 MG tablet TAKE 1 TABLET BY MOUTH EVERY DAY 14 tablet 0   • hydrOXYzine (ATARAX) 25 MG tablet Take 1-2 tablets PO TID PRN for anxiety/sleep 180 tablet 0   • omeprazole (PrilOSEC) 20 MG capsule Take 1 capsule by mouth Daily. 90 capsule 3     No current  facility-administered medications for this visit.       Review of Symptoms:    Review of Systems   Constitutional: Negative.    Psychiatric/Behavioral: Positive for stress.         Physical Exam:   Due to the remote nature of this encounter (virtual encounter), vitals were unable to be obtained.  Height stated at 66 inches.  Weight stated at 175 pounds.         Physical Exam  Neurological:      Mental Status: She is alert.   Psychiatric:         Attention and Perception: Attention and perception normal.         Mood and Affect: Mood and affect normal.         Speech: Speech normal.         Behavior: Behavior normal. Behavior is cooperative.         Thought Content: Thought content normal. Thought content is not paranoid or delusional. Thought content does not include homicidal or suicidal ideation. Thought content does not include homicidal or suicidal plan.         Cognition and Memory: Cognition and memory normal.         Judgment: Judgment normal.           Mental Status Exam:   Hygiene:   good  Cooperation:  Cooperative  Eye Contact:  Good  Psychomotor Behavior:  Appropriate  Affect:  Appropriate  Mood: euthymic  Speech:  Normal  Thought Process:  Linear  Thought Content:  Normal  Suicidal:  None  Homicidal:  None  Hallucinations:  None  Delusion:  None  Memory:  Intact  Orientation:  Person, Place, Time and Situation  Reliability:  good  Insight:  Good  Judgement:  Good  Impulse Control:  Good  Physical/Medical Issues:  No          Previous Provider notes and available records reviewed by this APRN at today's encounter.         Lab Results:   No visits with results within 1 Month(s) from this visit.   Latest known visit with results is:   Lab on 02/11/2022   Component Date Value Ref Range Status   •  02/11/2022 7.5  0.0 - 38.1 U/mL Final   • Glucose 02/11/2022 86  65 - 99 mg/dL Final   • BUN 02/11/2022 12  6 - 20 mg/dL Final   • Creatinine 02/11/2022 0.76  0.57 - 1.00 mg/dL Final   • Sodium 02/11/2022 140   136 - 145 mmol/L Final   • Potassium 02/11/2022 4.7  3.5 - 5.2 mmol/L Final   • Chloride 02/11/2022 104  98 - 107 mmol/L Final   • CO2 02/11/2022 26.0  22.0 - 29.0 mmol/L Final   • Calcium 02/11/2022 9.6  8.6 - 10.5 mg/dL Final   • Total Protein 02/11/2022 7.5  6.0 - 8.5 g/dL Final   • Albumin 02/11/2022 4.80  3.50 - 5.20 g/dL Final   • ALT (SGPT) 02/11/2022 60 (A) 1 - 33 U/L Final   • AST (SGOT) 02/11/2022 35 (A) 1 - 32 U/L Final   • Alkaline Phosphatase 02/11/2022 102  39 - 117 U/L Final   • Total Bilirubin 02/11/2022 0.4  0.0 - 1.2 mg/dL Final   • eGFR Non  Amer 02/11/2022 85  >60 mL/min/1.73 Final   • Globulin 02/11/2022 2.7  gm/dL Final   • A/G Ratio 02/11/2022 1.8  g/dL Final   • BUN/Creatinine Ratio 02/11/2022 15.8  7.0 - 25.0 Final   • Anion Gap 02/11/2022 10.0  5.0 - 15.0 mmol/L Final   • Hemoglobin A1C 02/11/2022 5.40  4.80 - 5.60 % Final   • Total Cholesterol 02/11/2022 197  0 - 200 mg/dL Final   • Triglycerides 02/11/2022 74  0 - 150 mg/dL Final   • HDL Cholesterol 02/11/2022 59  40 - 60 mg/dL Final   • LDL Cholesterol  02/11/2022 125 (A) 0 - 100 mg/dL Final   • VLDL Cholesterol 02/11/2022 13  5 - 40 mg/dL Final   • LDL/HDL Ratio 02/11/2022 2.09   Final   • 25 Hydroxy, Vitamin D 02/11/2022 29.2 (A) 30.0 - 100.0 ng/ml Final   • TSH 02/11/2022 1.070  0.270 - 4.200 uIU/mL Final   • T4, Total 02/11/2022 8.86  4.50 - 11.70 mcg/dL Final   • Color, UA 02/11/2022 Yellow  Yellow, Straw Final   • Appearance, UA 02/11/2022 Clear  Clear Final   • pH, UA 02/11/2022 6.5  5.0 - 8.0 Final   • Specific Gravity, UA 02/11/2022 1.013  1.005 - 1.030 Final   • Glucose, UA 02/11/2022 Negative  Negative Final   • Ketones, UA 02/11/2022 Negative  Negative Final   • Bilirubin, UA 02/11/2022 Negative  Negative Final   • Blood, UA 02/11/2022 Negative  Negative Final   • Protein, UA 02/11/2022 Negative  Negative Final   • Leuk Esterase, UA 02/11/2022 Negative  Negative Final   • Nitrite, UA 02/11/2022 Negative  Negative  Final   • Urobilinogen, UA 02/11/2022 0.2 E.U./dL  0.2 - 1.0 E.U./dL Final   • WBC 02/11/2022 6.24  3.40 - 10.80 10*3/mm3 Final   • RBC 02/11/2022 4.87  3.77 - 5.28 10*6/mm3 Final   • Hemoglobin 02/11/2022 15.3  12.0 - 15.9 g/dL Final   • Hematocrit 02/11/2022 44.2  34.0 - 46.6 % Final   • MCV 02/11/2022 90.8  79.0 - 97.0 fL Final   • MCH 02/11/2022 31.4  26.6 - 33.0 pg Final   • MCHC 02/11/2022 34.6  31.5 - 35.7 g/dL Final   • RDW 02/11/2022 12.2 (A) 12.3 - 15.4 % Final   • RDW-SD 02/11/2022 40.1  37.0 - 54.0 fl Final   • MPV 02/11/2022 11.0  6.0 - 12.0 fL Final   • Platelets 02/11/2022 267  140 - 450 10*3/mm3 Final   • Neutrophil % 02/11/2022 57.7  42.7 - 76.0 % Final   • Lymphocyte % 02/11/2022 30.1  19.6 - 45.3 % Final   • Monocyte % 02/11/2022 9.1  5.0 - 12.0 % Final   • Eosinophil % 02/11/2022 2.1  0.3 - 6.2 % Final   • Basophil % 02/11/2022 1.0  0.0 - 1.5 % Final   • Immature Grans % 02/11/2022 0.0  0.0 - 0.5 % Final   • Neutrophils, Absolute 02/11/2022 3.60  1.70 - 7.00 10*3/mm3 Final   • Lymphocytes, Absolute 02/11/2022 1.88  0.70 - 3.10 10*3/mm3 Final   • Monocytes, Absolute 02/11/2022 0.57  0.10 - 0.90 10*3/mm3 Final   • Eosinophils, Absolute 02/11/2022 0.13  0.00 - 0.40 10*3/mm3 Final   • Basophils, Absolute 02/11/2022 0.06  0.00 - 0.20 10*3/mm3 Final   • Immature Grans, Absolute 02/11/2022 0.00  0.00 - 0.05 10*3/mm3 Final   • nRBC 02/11/2022 0.0  0.0 - 0.2 /100 WBC Final         Assessment/Plan   Problems Addressed this Visit    None     Visit Diagnoses     Attention deficit hyperactivity disorder, combined type  (Chronic)   -  Primary    Relevant Medications    amphetamine-dextroamphetamine XR (Adderall XR) 15 MG 24 hr capsule    amphetamine-dextroamphetamine (Adderall) 10 MG tablet      Diagnoses       Codes Comments    Attention deficit hyperactivity disorder, combined type    -  Primary ICD-10-CM: F90.2  ICD-9-CM: 314.01           Visit Diagnoses:    ICD-10-CM ICD-9-CM   1. Attention deficit  hyperactivity disorder, combined type  F90.2 314.01          GOALS:  Short Term Goals: Patient will be compliant with medication, and patient will have no significant medication related side effects.  Patient will be engaged in psychotherapy as indicated.  Patient will report subjective improvement of symptoms.  Long term goals: To stabilize mood and treat/improve subjective symptoms, the patient will stay out of the hospital, the patient will be at an optimal level of functioning, and the patient will take all medications as prescribed.  The patient verbalized understanding and agreement with goals that were mutually set.      TREATMENT PLAN:   -Continue Adderall XR 15 mg PO QAM for ADHD  -Continue Adderall 5-10 mg PO every afternoon PRN for ADHD  -Continue hydroxyzine 25-50 mg PO TID PRN for anxiety    Medication and treatment options, both pharmacological and non-pharmacological treatment options, discussed during today's visit, including any off label use of medication. Patient acknowledged and verbally consented with current treatment plan and was educated on the importance of compliance with treatment and follow-up appointments.        MEDICATION ISSUES:    Discussed treatment plan and medication options of prescribed medication as well as the risks, benefits, any black box warnings, and side effects including potential falls, possible impaired driving, and metabolic adversities among others, including any off label use of medication. Patient is agreeable to call the office with any worsening of symptoms or onset of side effects, or if any concerns or questions arise.  The contact information for the office is made available to the patient. Patient is agreeable to call 911 or go to the nearest ER should they begin having any SI/HI, or if any urgent concerns arise. No medication side effects or related complaints today.       MEDS ORDERED DURING VISIT:  New Medications Ordered This Visit   Medications   •  amphetamine-dextroamphetamine XR (Adderall XR) 15 MG 24 hr capsule     Sig: Take 1 capsule by mouth Every Morning     Dispense:  30 capsule     Refill:  0   • amphetamine-dextroamphetamine (Adderall) 10 MG tablet     Sig: Take 1/2-1 tablet PO every afternoon PRN     Dispense:  30 tablet     Refill:  0     Pt is also taking Adderall XR       Return in about 4 weeks (around 5/4/2022), or if symptoms worsen or fail to improve, for Recheck.     Treatment plan completed on 1/31/22    Progress toward goal: Not at goal    Functional Status: Mild impairment     Prognosis: Good with Ongoing Treatment         This document has been electronically signed by MICHEL Guerrero  April 6, 2022 14:24 EDT     Some of the data in this electronic note has been brought forward from a previous encounter, any necessary changes have been made, it has been reviewed by this APRN, and it is accurate.    Please note that portions of this note were completed with a voice recognition program. Efforts were made to edit dictation, but occasionally words are mistranscribed.

## 2022-05-04 ENCOUNTER — TELEMEDICINE (OUTPATIENT)
Dept: PSYCHIATRY | Facility: CLINIC | Age: 37
End: 2022-05-04

## 2022-05-04 DIAGNOSIS — F90.2 ATTENTION DEFICIT HYPERACTIVITY DISORDER, COMBINED TYPE: Primary | Chronic | ICD-10-CM

## 2022-05-04 PROCEDURE — 99213 OFFICE O/P EST LOW 20 MIN: CPT | Performed by: NURSE PRACTITIONER

## 2022-05-04 RX ORDER — DEXTROAMPHETAMINE SACCHARATE, AMPHETAMINE ASPARTATE, DEXTROAMPHETAMINE SULFATE AND AMPHETAMINE SULFATE 2.5; 2.5; 2.5; 2.5 MG/1; MG/1; MG/1; MG/1
TABLET ORAL
Qty: 30 TABLET | Refills: 0 | Status: SHIPPED | OUTPATIENT
Start: 2022-05-04 | End: 2022-06-20 | Stop reason: SDUPTHER

## 2022-05-04 RX ORDER — DEXTROAMPHETAMINE SACCHARATE, AMPHETAMINE ASPARTATE MONOHYDRATE, DEXTROAMPHETAMINE SULFATE AND AMPHETAMINE SULFATE 3.75; 3.75; 3.75; 3.75 MG/1; MG/1; MG/1; MG/1
15 CAPSULE, EXTENDED RELEASE ORAL EVERY MORNING
Qty: 30 CAPSULE | Refills: 0 | Status: SHIPPED | OUTPATIENT
Start: 2022-05-04 | End: 2022-06-20 | Stop reason: SDUPTHER

## 2022-05-04 NOTE — PROGRESS NOTES
This provider is located at the [facility] (through [facility]), [address], [place], [address] using a secure [employer] Video Visit through [employer]. Patient is being seen remotely via telehealth at their home address in [place], and stated they are in a secure environment for this session. The patient's condition being diagnosed/treated is appropriate for telemedicine. The provider identified herself as well as her credentials.   The patient, and/or patients guardian, consent to be seen remotely, and when consent is given they understand that the consent allows for patient identifiable information to be sent to a third party as needed.   They may refuse to be seen remotely at any time. The electronic data is encrypted and password protected, and the patient and/or guardian has been advised of the potential risks to privacy not withstanding such measures.    You have chosen to receive care through a telehealth visit.  Do you consent to use a video/audio connection for your medical care today? Yes        Subjective   Honey Thomas is a 36 y.o. female who presents today for follow up    Chief Complaint:  ADHD     Accompanied by: Pt was alone for duration of appointment    History of Present Illness:   Pt reports being frustrated because she is just now finding out that she has a $1210-5317 bill from [facility] for all her appointments with this provider. Her frustration is understandable. Pt has been seeing this APRN for well over a year and was never told that she was out-of-network with her insurance. Pt also doesn't want to find a new provider because she has built good rapport with this APRN. Pt is going to talk to insurance and billing more. This APRN is going to move patient's appointments to every 12 weeks to make it more affordable if she wants to continue seeing this APRN.   Otherwise, pt continues to well on the Adderall XR and Adderall. Pt only uses the IR if  needed. Pt needs a stimulant for a better quality of life. She is able to focus and concentrate on it. Pt continues to work two jobs. Mood has been stable as has sleep and appetite. The patient denies any new medical problems or changes in medications since last appointment with this facility. The patient reports compliance with current medication regimen. The patient denies any current side effects from her current medication regimen. The patient denies any abnormal muscle movements or tics. The patient would like to not adjust or change their medications at this visit. The patient denies any suicidal or homicidal ideations, plans, or intent at today's encounter and is convincing.  The patient denies any auditory hallucinations or visual hallucinations. The patient does not endorse any significant symptoms consistent with lulu or psychosis at today's encounter.          Prior Psychiatric Medications:  Buspar: pt struggled to remember to take multiple times a day  Zoloft: ineffective  Wellbutrin XL: ineffective  Paxil: ineffective  Strattera: nausea, irritability        The following portions of the patient's history were reviewed and updated as appropriate: allergies, current medications, past family history, past medical history, past social history, past surgical history and problem list.          Past Medical History:  Past Medical History:   Diagnosis Date   • Allergic    • Anxiety    • BPPV (benign paroxysmal positional vertigo)    • Carpal tunnel syndrome 2010    bilat   • TMJ (dislocation of temporomandibular joint) 2/4/2021   • Vitamin D deficiency        Social History:  Social History     Socioeconomic History   • Marital status:    Tobacco Use   • Smoking status: Never Smoker   • Smokeless tobacco: Never Used   Substance and Sexual Activity   • Alcohol use: Yes     Comment: social   • Drug use: No   • Sexual activity: Yes     Partners: Male     Birth control/protection: Surgical       Family  History:  Family History   Problem Relation Age of Onset   • Hypertension Father    • Diabetes Father    • Hypertension Mother    • Ovarian cancer Mother 37   • Graves' disease Sister    • Scoliosis Sister    • Sleep apnea Sister    • Lung cancer Maternal Grandmother    • Alzheimer's disease Paternal Grandmother    • Other Paternal Grandfather         accident   • Autism Niece        Past Surgical History:  Past Surgical History:   Procedure Laterality Date   • ABDOMINOPLASTY N/A 2019    Procedure: ABDOMINOPLASTY;  Surgeon: Alphonso Vasquez MD;  Location:  [facility];  Service: Plastics   • BREAST AUGMENTATION Bilateral 2019    Procedure: BREAST AUGMENTATION WITH IMPLANTS BILATERAL;  Surgeon: Alphonso Vasquez MD;  Location:  [facility];  Service: Plastics   • FRACTURE SURGERY Left 1995    lEFT FOREARM   • TOTAL LAPAROSCOPIC HYSTERECTOMY SALPINGO OOPHORECTOMY N/A 2019    Procedure: TOTAL ROBOTIC HYSTERECTOMY WITH BILATERAL SALPINGO-OOPHORECTOMY;  Surgeon: Summer Dorsey DO;  Location:  [facility];  Service: DaVinci   • WISDOM TOOTH EXTRACTION         Problem List:  Patient Active Problem List   Diagnosis   •  (normal spontaneous vaginal delivery)   • Vitamin D deficiency   • Anxiety   • BPPV (benign paroxysmal positional vertigo)   • Carpal tunnel syndrome   • TMJ (dislocation of temporomandibular joint)   • Family history of ovarian cancer   • Mold exposure       Allergy:   No Known Allergies     Current Medications:   Current Outpatient Medications   Medication Sig Dispense Refill   • amphetamine-dextroamphetamine (Adderall) 10 MG tablet Take 1/2-1 tablet PO every afternoon PRN 30 tablet 0   • amphetamine-dextroamphetamine XR (Adderall XR) 15 MG 24 hr capsule Take 1 capsule by mouth Every Morning 30 capsule 0   • cyclobenzaprine (FLEXERIL) 10 MG tablet Take 1 tablet by mouth 3 (Three) Times a Day As Needed for Muscle Spasms. 30 tablet 1   • estradiol (ESTRACE) 1 MG tablet TAKE 1 TABLET BY  MOUTH EVERY DAY 14 tablet 0   • hydrOXYzine (ATARAX) 25 MG tablet Take 1-2 tablets PO TID PRN for anxiety/sleep 180 tablet 0   • omeprazole (PrilOSEC) 20 MG capsule Take 1 capsule by mouth Daily. 90 capsule 3     No current facility-administered medications for this visit.       Review of Symptoms:    Review of Systems   Constitutional: Negative.    Psychiatric/Behavioral: Positive for stress.         Physical Exam:   Due to the remote nature of this encounter (virtual encounter), vitals were unable to be obtained.  Height stated at 66 inches.  Weight stated at 175 pounds.         Physical Exam  Neurological:      Mental Status: She is alert.   Psychiatric:         Attention and Perception: Attention and perception normal.         Mood and Affect: Affect normal.         Speech: Speech normal.         Behavior: Behavior normal. Behavior is cooperative.         Thought Content: Thought content normal. Thought content is not paranoid or delusional. Thought content does not include homicidal or suicidal ideation. Thought content does not include homicidal or suicidal plan.         Cognition and Memory: Cognition and memory normal.         Judgment: Judgment normal.      Comments: Frustrated regarding billing           Mental Status Exam:   Hygiene:   good  Cooperation:  Cooperative  Eye Contact:  Good  Psychomotor Behavior:  Appropriate  Affect:  Appropriate  Mood: Frustrated  Speech:  Normal  Thought Process:  Linear  Thought Content:  Normal  Suicidal:  None  Homicidal:  None  Hallucinations:  None  Delusion:  None  Memory:  Intact  Orientation:  Person, Place, Time and Situation  Reliability:  good  Insight:  Good  Judgement:  Good  Impulse Control:  Good  Physical/Medical Issues:  No        GO request number [id number] reviewed by this APRN at today's encounter.    Previous Provider notes and available records reviewed by this APRN at today's encounter.         Lab Results:   No visits with results within 1  Month(s) from this visit.   Latest known visit with results is:   Lab on 02/11/2022   Component Date Value Ref Range Status   •  02/11/2022 7.5  0.0 - 38.1 U/mL Final   • Glucose 02/11/2022 86  65 - 99 mg/dL Final   • BUN 02/11/2022 12  6 - 20 mg/dL Final   • Creatinine 02/11/2022 0.76  0.57 - 1.00 mg/dL Final   • Sodium 02/11/2022 140  136 - 145 mmol/L Final   • Potassium 02/11/2022 4.7  3.5 - 5.2 mmol/L Final   • Chloride 02/11/2022 104  98 - 107 mmol/L Final   • CO2 02/11/2022 26.0  22.0 - 29.0 mmol/L Final   • Calcium 02/11/2022 9.6  8.6 - 10.5 mg/dL Final   • Total Protein 02/11/2022 7.5  6.0 - 8.5 g/dL Final   • Albumin 02/11/2022 4.80  3.50 - 5.20 g/dL Final   • ALT (SGPT) 02/11/2022 60 (A) 1 - 33 U/L Final   • AST (SGOT) 02/11/2022 35 (A) 1 - 32 U/L Final   • Alkaline Phosphatase 02/11/2022 102  39 - 117 U/L Final   • Total Bilirubin 02/11/2022 0.4  0.0 - 1.2 mg/dL Final   • eGFR Non  Amer 02/11/2022 85  >60 mL/min/1.73 Final   • Globulin 02/11/2022 2.7  gm/dL Final   • A/G Ratio 02/11/2022 1.8  g/dL Final   • BUN/Creatinine Ratio 02/11/2022 15.8  7.0 - 25.0 Final   • Anion Gap 02/11/2022 10.0  5.0 - 15.0 mmol/L Final   • Hemoglobin A1C 02/11/2022 5.40  4.80 - 5.60 % Final   • Total Cholesterol 02/11/2022 197  0 - 200 mg/dL Final   • Triglycerides 02/11/2022 74  0 - 150 mg/dL Final   • HDL Cholesterol 02/11/2022 59  40 - 60 mg/dL Final   • LDL Cholesterol  02/11/2022 125 (A) 0 - 100 mg/dL Final   • VLDL Cholesterol 02/11/2022 13  5 - 40 mg/dL Final   • LDL/HDL Ratio 02/11/2022 2.09   Final   • 25 Hydroxy, Vitamin D 02/11/2022 29.2 (A) 30.0 - 100.0 ng/ml Final   • TSH 02/11/2022 1.070  0.270 - 4.200 uIU/mL Final   • T4, Total 02/11/2022 8.86  4.50 - 11.70 mcg/dL Final   • Color, UA 02/11/2022 Yellow  Yellow, Straw Final   • Appearance, UA 02/11/2022 Clear  Clear Final   • pH, UA 02/11/2022 6.5  5.0 - 8.0 Final   • Specific Gravity, UA 02/11/2022 1.013  1.005 - 1.030 Final   • Glucose, UA  02/11/2022 Negative  Negative Final   • Ketones, UA 02/11/2022 Negative  Negative Final   • Bilirubin, UA 02/11/2022 Negative  Negative Final   • Blood, UA 02/11/2022 Negative  Negative Final   • Protein, UA 02/11/2022 Negative  Negative Final   • Leuk Esterase, UA 02/11/2022 Negative  Negative Final   • Nitrite, UA 02/11/2022 Negative  Negative Final   • Urobilinogen, UA 02/11/2022 0.2 E.U./dL  0.2 - 1.0 E.U./dL Final   • WBC 02/11/2022 6.24  3.40 - 10.80 10*3/mm3 Final   • RBC 02/11/2022 4.87  3.77 - 5.28 10*6/mm3 Final   • Hemoglobin 02/11/2022 15.3  12.0 - 15.9 g/dL Final   • Hematocrit 02/11/2022 44.2  34.0 - 46.6 % Final   • MCV 02/11/2022 90.8  79.0 - 97.0 fL Final   • MCH 02/11/2022 31.4  26.6 - 33.0 pg Final   • MCHC 02/11/2022 34.6  31.5 - 35.7 g/dL Final   • RDW 02/11/2022 12.2 (A) 12.3 - 15.4 % Final   • RDW-SD 02/11/2022 40.1  37.0 - 54.0 fl Final   • MPV 02/11/2022 11.0  6.0 - 12.0 fL Final   • Platelets 02/11/2022 267  140 - 450 10*3/mm3 Final   • Neutrophil % 02/11/2022 57.7  42.7 - 76.0 % Final   • Lymphocyte % 02/11/2022 30.1  19.6 - 45.3 % Final   • Monocyte % 02/11/2022 9.1  5.0 - 12.0 % Final   • Eosinophil % 02/11/2022 2.1  0.3 - 6.2 % Final   • Basophil % 02/11/2022 1.0  0.0 - 1.5 % Final   • Immature Grans % 02/11/2022 0.0  0.0 - 0.5 % Final   • Neutrophils, Absolute 02/11/2022 3.60  1.70 - 7.00 10*3/mm3 Final   • Lymphocytes, Absolute 02/11/2022 1.88  0.70 - 3.10 10*3/mm3 Final   • Monocytes, Absolute 02/11/2022 0.57  0.10 - 0.90 10*3/mm3 Final   • Eosinophils, Absolute 02/11/2022 0.13  0.00 - 0.40 10*3/mm3 Final   • Basophils, Absolute 02/11/2022 0.06  0.00 - 0.20 10*3/mm3 Final   • Immature Grans, Absolute 02/11/2022 0.00  0.00 - 0.05 10*3/mm3 Final   • nRBC 02/11/2022 0.0  0.0 - 0.2 /100 WBC Final         Assessment/Plan   Problems Addressed this Visit    None     Visit Diagnoses     Attention deficit hyperactivity disorder, combined type  (Chronic)   -  Primary    Relevant Medications     amphetamine-dextroamphetamine XR (Adderall XR) 15 MG 24 hr capsule    amphetamine-dextroamphetamine (Adderall) 10 MG tablet      Diagnoses       Codes Comments    Attention deficit hyperactivity disorder, combined type    -  Primary ICD-10-CM: F90.2  ICD-9-CM: 314.01           Visit Diagnoses:    ICD-10-CM ICD-9-CM   1. Attention deficit hyperactivity disorder, combined type  F90.2 314.01          GOALS:  Short Term Goals: Patient will be compliant with medication, and patient will have no significant medication related side effects.  Patient will be engaged in psychotherapy as indicated.  Patient will report subjective improvement of symptoms.  Long term goals: To stabilize mood and treat/improve subjective symptoms, the patient will stay out of the hospital, the patient will be at an optimal level of functioning, and the patient will take all medications as prescribed.  The patient verbalized understanding and agreement with goals that were mutually set.      TREATMENT PLAN:   -Continue Adderall XR 15 mg PO QAM for ADHD  -Continue Adderall 5-10 mg PO every afternoon PRN for ADHD  -Continue hydroxyzine 25-50 mg PO TID PRN for anxiety    Medication and treatment options, both pharmacological and non-pharmacological treatment options, discussed during today's visit, including any off label use of medication. Patient acknowledged and verbally consented with current treatment plan and was educated on the importance of compliance with treatment and follow-up appointments.        MEDICATION ISSUES:    Discussed treatment plan and medication options of prescribed medication as well as the risks, benefits, any black box warnings, and side effects including potential falls, possible impaired driving, and metabolic adversities among others, including any off label use of medication. Patient is agreeable to call the office with any worsening of symptoms or onset of side effects, or if any concerns or questions arise.  The  contact information for the office is made available to the patient. Patient is agreeable to call 911 or go to the nearest ER should they begin having any SI/HI, or if any urgent concerns arise. No medication side effects or related complaints today.       MEDS ORDERED DURING VISIT:  New Medications Ordered This Visit   Medications   • amphetamine-dextroamphetamine XR (Adderall XR) 15 MG 24 hr capsule     Sig: Take 1 capsule by mouth Every Morning     Dispense:  30 capsule     Refill:  0   • amphetamine-dextroamphetamine (Adderall) 10 MG tablet     Sig: Take 1/2-1 tablet PO every afternoon PRN     Dispense:  30 tablet     Refill:  0     Pt is also taking Adderall XR       Return in about 12 weeks (around 7/27/2022), or if symptoms worsen or fail to improve, for Recheck.     Treatment plan completed on 1/31/22    Progress toward goal: Not at goal    Functional Status: Mild impairment     Prognosis: Good with Ongoing Treatment         This document has been electronically signed by MICHEL Guerrero  May 4, 2022 16:43 EDT     Some of the data in this electronic note has been brought forward from a previous encounter, any necessary changes have been made, it has been reviewed by this APRN, and it is accurate.    Please note that portions of this note were completed with a voice recognition program. Efforts were made to edit dictation, but occasionally words are mistranscribed.

## 2022-06-20 DIAGNOSIS — F90.2 ATTENTION DEFICIT HYPERACTIVITY DISORDER, COMBINED TYPE: Chronic | ICD-10-CM

## 2022-06-20 RX ORDER — DEXTROAMPHETAMINE SACCHARATE, AMPHETAMINE ASPARTATE MONOHYDRATE, DEXTROAMPHETAMINE SULFATE AND AMPHETAMINE SULFATE 3.75; 3.75; 3.75; 3.75 MG/1; MG/1; MG/1; MG/1
15 CAPSULE, EXTENDED RELEASE ORAL EVERY MORNING
Qty: 30 CAPSULE | Refills: 0 | Status: SHIPPED | OUTPATIENT
Start: 2022-06-20 | End: 2022-07-27 | Stop reason: SDUPTHER

## 2022-06-20 RX ORDER — DEXTROAMPHETAMINE SACCHARATE, AMPHETAMINE ASPARTATE, DEXTROAMPHETAMINE SULFATE AND AMPHETAMINE SULFATE 2.5; 2.5; 2.5; 2.5 MG/1; MG/1; MG/1; MG/1
TABLET ORAL
Qty: 30 TABLET | Refills: 0 | Status: SHIPPED | OUTPATIENT
Start: 2022-06-20 | End: 2022-07-27 | Stop reason: SDUPTHER

## 2022-07-15 ENCOUNTER — OFFICE VISIT (OUTPATIENT)
Dept: FAMILY MEDICINE CLINIC | Facility: CLINIC | Age: 37
End: 2022-07-15

## 2022-07-15 VITALS
WEIGHT: 171 LBS | SYSTOLIC BLOOD PRESSURE: 130 MMHG | OXYGEN SATURATION: 100 % | BODY MASS INDEX: 27.6 KG/M2 | TEMPERATURE: 99.3 F | RESPIRATION RATE: 22 BRPM | DIASTOLIC BLOOD PRESSURE: 90 MMHG | HEART RATE: 72 BPM

## 2022-07-15 DIAGNOSIS — R05.9 COUGH: ICD-10-CM

## 2022-07-15 DIAGNOSIS — Z20.822 EXPOSURE TO COVID-19 VIRUS: ICD-10-CM

## 2022-07-15 DIAGNOSIS — R50.9 FEVER, UNSPECIFIED FEVER CAUSE: Primary | ICD-10-CM

## 2022-07-15 DIAGNOSIS — R52 BODY ACHES: ICD-10-CM

## 2022-07-15 DIAGNOSIS — R11.0 NAUSEA: ICD-10-CM

## 2022-07-15 LAB
EXPIRATION DATE: NORMAL
FLUAV AG NPH QL: NEGATIVE
FLUBV AG NPH QL: NEGATIVE
INTERNAL CONTROL: NORMAL
Lab: NORMAL

## 2022-07-15 PROCEDURE — 96372 THER/PROPH/DIAG INJ SC/IM: CPT | Performed by: NURSE PRACTITIONER

## 2022-07-15 PROCEDURE — 87804 INFLUENZA ASSAY W/OPTIC: CPT | Performed by: NURSE PRACTITIONER

## 2022-07-15 PROCEDURE — C9803 HOPD COVID-19 SPEC COLLECT: HCPCS | Performed by: NURSE PRACTITIONER

## 2022-07-15 PROCEDURE — 99213 OFFICE O/P EST LOW 20 MIN: CPT | Performed by: NURSE PRACTITIONER

## 2022-07-15 PROCEDURE — U0004 COV-19 TEST NON-CDC HGH THRU: HCPCS | Performed by: NURSE PRACTITIONER

## 2022-07-15 RX ORDER — KETOROLAC TROMETHAMINE 30 MG/ML
30 INJECTION, SOLUTION INTRAMUSCULAR; INTRAVENOUS EVERY 6 HOURS PRN
Status: DISCONTINUED | OUTPATIENT
Start: 2022-07-15 | End: 2022-07-15

## 2022-07-15 RX ORDER — ONDANSETRON 4 MG/1
4 TABLET, ORALLY DISINTEGRATING ORAL EVERY 8 HOURS PRN
Qty: 20 TABLET | Refills: 0 | Status: SHIPPED | OUTPATIENT
Start: 2022-07-15

## 2022-07-15 RX ORDER — KETOROLAC TROMETHAMINE 30 MG/ML
30 INJECTION, SOLUTION INTRAMUSCULAR; INTRAVENOUS ONCE
Status: COMPLETED | OUTPATIENT
Start: 2022-07-15 | End: 2022-07-15

## 2022-07-15 RX ORDER — BROMPHENIRAMINE MALEATE, PSEUDOEPHEDRINE HYDROCHLORIDE, AND DEXTROMETHORPHAN HYDROBROMIDE 2; 30; 10 MG/5ML; MG/5ML; MG/5ML
10 SYRUP ORAL EVERY 6 HOURS PRN
Qty: 400 ML | Refills: 0 | Status: SHIPPED | OUTPATIENT
Start: 2022-07-15 | End: 2022-07-25

## 2022-07-15 RX ORDER — INDOMETHACIN 50 MG/1
50 CAPSULE ORAL 3 TIMES DAILY PRN
Qty: 30 CAPSULE | Refills: 0 | Status: SHIPPED | OUTPATIENT
Start: 2022-07-15 | End: 2022-12-16

## 2022-07-15 RX ADMIN — KETOROLAC TROMETHAMINE 30 MG: 30 INJECTION, SOLUTION INTRAMUSCULAR; INTRAVENOUS at 16:19

## 2022-07-15 NOTE — PROGRESS NOTES
Chief Complaint  Fever, Chills, and Generalized Body Aches    Subjective          Honey Thomas presents to [facility]  Patient is a 36-year-old female.  She is here for complaint of fever, chills, body aches,HA, positive exposure to COVID.  Her symptoms started 2 days ago.  Max temperature is 103.2.  She has been taking Tylenol and ibuprofen.  She is experiencing some nausea.  Body aches buttocks, legs, headache.  She denies any chest pain, shortness of breath, no loss of taste or smell.   She has no hx of asthma or copd, no smoker. Denies wheezing or shortness of air. Has been checking her SAo2 - normal. She was COVID -19 and influenza tested yesterday per patient.           The following portions of the patient's history were reviewed and updated as appropriate: allergies, current medications, past family history, past medical history, past social history, past surgical history and problem list.    Review of Systems   Constitutional: Positive for activity change, chills, fatigue and fever.   HENT: Positive for congestion.    Respiratory: Positive for cough. Negative for chest tightness, shortness of breath and wheezing.    Gastrointestinal: Positive for nausea. Negative for constipation, diarrhea and vomiting.   Musculoskeletal: Positive for myalgias. Negative for arthralgias.        Body aches worse on buttocks, legs, and having a headache.      Skin: Negative.    Neurological: Positive for headaches.   Hematological: Negative.    Psychiatric/Behavioral: Negative.          Objective   Vital Signs:   /90   Pulse 72   Temp 99.3 °F (37.4 °C)   Resp 22   Wt 77.6 kg (171 lb)   SpO2 100%   BMI 27.60 kg/m²               Physical Exam  Vitals reviewed.   HENT:      Right Ear: Tympanic membrane, ear canal and external ear normal.      Left Ear: Tympanic membrane, ear canal and external ear normal.      Nose: Congestion present.   Cardiovascular:      Rate and Rhythm:  Normal rate.   Pulmonary:      Effort: Pulmonary effort is normal. No respiratory distress.      Breath sounds: Normal breath sounds. No stridor. No wheezing, rhonchi or rales.   Chest:      Chest wall: No tenderness.   Abdominal:      General: Bowel sounds are normal.      Palpations: Abdomen is soft.   Musculoskeletal:         General: Normal range of motion.   Skin:     General: Skin is warm.   Neurological:      Mental Status: She is alert and oriented to person, place, and time.   Psychiatric:         Mood and Affect: Mood normal.         Behavior: Behavior normal.        Result Review :                 Assessment and Plan    Diagnoses and all orders for this visit:    1. Fever, unspecified fever cause (Primary)  -     COVID-19 PCR, [facility], NP SWAB IN [facility] VIRAL TRANSPORT MEDIA/ORAL SWISH 24-30 HR TAT - Swab, Nasopharynx; Future  -     POCT Influenza A/B  -     ketorolac (TORADOL) injection 30 mg    2. Nausea  -     ondansetron ODT (Zofran ODT) 4 MG disintegrating tablet; Place 1 tablet on the tongue Every 8 (Eight) Hours As Needed for Nausea or Vomiting.  Dispense: 20 tablet; Refill: 0    3. Exposure to COVID-19 virus  -     POCT Influenza A/B    4. Body aches  -     Discontinue: ketorolac (TORADOL) injection 30 mg  -     indomethacin (INDOCIN) 50 MG capsule; Take 1 capsule by mouth 3 (Three) Times a Day As Needed for Mild Pain .  Dispense: 30 capsule; Refill: 0  -     ketorolac (TORADOL) injection 30 mg    5. Cough  -     brompheniramine-pseudoephedrine-DM (Bromfed DM) 30-2-10 MG/5ML syrup; Take 10 mL by mouth Every 6 (Six) Hours As Needed for Congestion, Cough or Allergies for up to 10 days.  Dispense: 400 mL; Refill: 0    Gargle warm salt water 1/4 teaspoon salt in 4 oz.warm water twice daily as needed.   Change toothbrush in 1 week   Avoid dairy products - this can increase your congestion.   Take acetaminophen per instructions for temp over 101.5.   Do not start the indomethacin for 8-10 hours due to  the toradol injection.   POCT influenza A/B: Negative   COVID-19 PCR - send out.     Follow up with your PCP as needed.   See COVID quarantine.   Go to ER for severe shortness of breath or chest pain.       Follow Up   Return if symptoms worsen or fail to improve.  Patient was given instructions and counseling regarding her condition or for health maintenance advice. Please see specific information pulled into the AVS if appropriate.

## 2022-07-16 LAB — SARS-COV-2 RNA NOSE QL NAA+PROBE: DETECTED

## 2022-07-17 ENCOUNTER — TELEPHONE (OUTPATIENT)
Dept: FAMILY MEDICINE CLINIC | Facility: CLINIC | Age: 37
End: 2022-07-17

## 2022-07-17 NOTE — TELEPHONE ENCOUNTER
Call patient regarding her COVID-19 PCR result.  No answer.  Left message for her to check her [employer] account.      COVID-19 PCR, [facility], NP SWAB IN [facility] VIRAL TRANSPORT MEDIA/ORAL SWISH 24-30 HR TAT - Swab, Nasopharynx (07/15/2022 16:33)  Patient is positive for COVID-19.  She was seen in office 2 days ago.  She has been instructed to follow CDC guidelines for possible COVID and instructed to go to the   emergency room if she worsened.    Vinita Love, APRN

## 2022-07-27 ENCOUNTER — TELEMEDICINE (OUTPATIENT)
Dept: PSYCHIATRY | Facility: CLINIC | Age: 37
End: 2022-07-27

## 2022-07-27 DIAGNOSIS — F90.2 ATTENTION DEFICIT HYPERACTIVITY DISORDER, COMBINED TYPE: Primary | Chronic | ICD-10-CM

## 2022-07-27 DIAGNOSIS — F41.8 SITUATIONAL ANXIETY: ICD-10-CM

## 2022-07-27 PROCEDURE — 99213 OFFICE O/P EST LOW 20 MIN: CPT | Performed by: NURSE PRACTITIONER

## 2022-07-27 RX ORDER — HYDROXYZINE HYDROCHLORIDE 25 MG/1
TABLET, FILM COATED ORAL
Qty: 90 TABLET | Refills: 0 | Status: SHIPPED | OUTPATIENT
Start: 2022-07-27 | End: 2022-10-01 | Stop reason: SDUPTHER

## 2022-07-27 RX ORDER — DEXTROAMPHETAMINE SACCHARATE, AMPHETAMINE ASPARTATE MONOHYDRATE, DEXTROAMPHETAMINE SULFATE AND AMPHETAMINE SULFATE 3.75; 3.75; 3.75; 3.75 MG/1; MG/1; MG/1; MG/1
15 CAPSULE, EXTENDED RELEASE ORAL EVERY MORNING
Qty: 30 CAPSULE | Refills: 0 | Status: SHIPPED | OUTPATIENT
Start: 2022-07-27 | End: 2022-08-29 | Stop reason: SDUPTHER

## 2022-07-27 RX ORDER — DEXTROAMPHETAMINE SACCHARATE, AMPHETAMINE ASPARTATE, DEXTROAMPHETAMINE SULFATE AND AMPHETAMINE SULFATE 2.5; 2.5; 2.5; 2.5 MG/1; MG/1; MG/1; MG/1
TABLET ORAL
Qty: 30 TABLET | Refills: 0 | Status: SHIPPED | OUTPATIENT
Start: 2022-07-27 | End: 2022-08-29 | Stop reason: SDUPTHER

## 2022-07-27 NOTE — PROGRESS NOTES
This provider is located at the [facility] (through [facility]), [address], [place], [address] using a secure [email or web address] Video Visit through [employer]. Patient is being seen remotely via telehealth at their home address in [place], and stated they are in a secure environment for this session. The patient's condition being diagnosed/treated is appropriate for telemedicine. The provider identified herself as well as her credentials.   The patient, and/or patients guardian, consent to be seen remotely, and when consent is given they understand that the consent allows for patient identifiable information to be sent to a third party as needed.   They may refuse to be seen remotely at any time. The electronic data is encrypted and password protected, and the patient and/or guardian has been advised of the potential risks to privacy not withstanding such measures.    You have chosen to receive care through a telehealth visit.  Do you consent to use a video/audio connection for your medical care today? Yes        Subjective   Honey Thomas is a 36 y.o. 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 Pt and her  provided a significant amount of money as a down payment for their pool and the start date kept getting pushed back. They were supposed to sign an updated contract, but when the day came they received an email from the business owner stating he is unable to complete their pool and would provide a refund in late June. In late June, the business owner notified them it wasn't going to be until July when they would receive their money. Pt realized she wasn't getting her money back and contacted an  . Pt found out that the business owner had been doing the same thing to a lot of other people who have never received a refund. Pt took the appropriate steps to get her money back and was successful in doing so. She had to put a lot of time and effort into it. She was anxious about it and had difficulty performing some of her job duties at work. The anxiety put a lot of pressure on her chest and neck. Pt states the hydroxyzine was beneficial. Per pt, the business owner was arrested last week. 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 Sleep is stable now that the situation has ended. ADHD symptoms are well-controlled. Mood is stable as is appetite. The patient denies any new medical problems or changes in medications since last appointment with this facility with the exception of COVID-19. The patient reports compliance with current medication regimen. The patient denies any current side effects from their current medication regimen. The patient denies any abnormal muscle movements or tics. The patient would like to not adjust or change their medications at this visit. The patient denies any suicidal or homicidal ideations, plans, or intent at today's encounter and is convincing.  The patient denies any auditory hallucinations or visual hallucinations. The patient does not endorse any significant symptoms consistent with lulu or psychosis at today's encounter.     *If the patient has any concerns or needs assistance, they may call the [facility] at [phone]*            Prior Psychiatric Medications:  Buspar: pt struggled to remember to take multiple times a day  Zoloft: ineffective  Wellbutrin XL: ineffective  Paxil: ineffective  Strattera: nausea, irritability        The following portions of the patient's history were reviewed and updated as appropriate: allergies, current medications, past family history, past medical history,  past social history, past surgical history and problem list.          Past Medical History:  Past Medical History:   Diagnosis Date   • Allergic    • Anxiety    • BPPV (benign paroxysmal positional vertigo)    • Carpal tunnel syndrome     bilat   • TMJ (dislocation of temporomandibular joint) 2021   • Vitamin D deficiency        Social History:  Social History     Socioeconomic History   • Marital status:    Tobacco Use   • Smoking status: Never Smoker   • Smokeless tobacco: Never Used   Substance and Sexual Activity   • Alcohol use: Yes     Comment: social   • Drug use: No   • Sexual activity: Yes     Partners: Male     Birth control/protection: Surgical       Family History:  Family History   Problem Relation Age of Onset   • Hypertension Father    • Diabetes Father    • Hypertension Mother    • Ovarian cancer Mother 37   • Graves' disease Sister    • Scoliosis Sister    • Sleep apnea Sister    • Lung cancer Maternal Grandmother    • Alzheimer's disease Paternal Grandmother    • Other Paternal Grandfather         accident   • Autism Niece        Past Surgical History:  Past Surgical History:   Procedure Laterality Date   • ABDOMINOPLASTY N/A 2019    Procedure: ABDOMINOPLASTY;  Surgeon: Alphonso Vasquez MD;  Location:  [facility] OR;  Service: Plastics   • BREAST AUGMENTATION Bilateral 2019    Procedure: BREAST AUGMENTATION WITH IMPLANTS BILATERAL;  Surgeon: Alphonso Vasquez MD;  Location:  [facility] OR;  Service: Plastics   • FRACTURE SURGERY Left 1995    lEFT FOREARM   • TOTAL LAPAROSCOPIC HYSTERECTOMY SALPINGO OOPHORECTOMY N/A 2019    Procedure: TOTAL ROBOTIC HYSTERECTOMY WITH BILATERAL SALPINGO-OOPHORECTOMY;  Surgeon: Summer Dorsey DO;  Location:  [facility] OR;  Service: DaVinci   • WISDOM TOOTH EXTRACTION         Problem List:  Patient Active Problem List   Diagnosis   •  (normal spontaneous vaginal delivery)   • Vitamin D deficiency   • Anxiety   • BPPV (benign paroxysmal  positional vertigo)   • Carpal tunnel syndrome   • TMJ (dislocation of temporomandibular joint)   • Family history of ovarian cancer   • Mold exposure       Allergy:   No Known Allergies     Current Medications:   Current Outpatient Medications   Medication Sig Dispense Refill   • amphetamine-dextroamphetamine (Adderall) 10 MG tablet Take 1/2-1 tablet PO every afternoon PRN 30 tablet 0   • amphetamine-dextroamphetamine XR (Adderall XR) 15 MG 24 hr capsule Take 1 capsule by mouth Every Morning 30 capsule 0   • hydrOXYzine (ATARAX) 25 MG tablet Take 1-2 tablets PO TID PRN for anxiety/sleep 90 tablet 0   • cyclobenzaprine (FLEXERIL) 10 MG tablet Take 1 tablet by mouth 3 (Three) Times a Day As Needed for Muscle Spasms. 30 tablet 1   • estradiol (ESTRACE) 1 MG tablet TAKE 1 TABLET BY MOUTH EVERY DAY 14 tablet 0   • indomethacin (INDOCIN) 50 MG capsule Take 1 capsule by mouth 3 (Three) Times a Day As Needed for Mild Pain . 30 capsule 0   • omeprazole (PrilOSEC) 20 MG capsule Take 1 capsule by mouth Daily. 90 capsule 3   • ondansetron ODT (Zofran ODT) 4 MG disintegrating tablet Place 1 tablet on the tongue Every 8 (Eight) Hours As Needed for Nausea or Vomiting. 20 tablet 0     No current facility-administered medications for this visit.       Review of Symptoms:    Review of Systems   Constitutional: Negative.    Psychiatric/Behavioral: Positive for stress.         Physical Exam:   Due to the remote nature of this encounter (virtual encounter), vitals were unable to be obtained.  Height stated at 66 inches.  Weight stated at 171 pounds.         Physical Exam  Neurological:      Mental Status: She is alert.   Psychiatric:         Attention and Perception: Attention and perception normal. She does not perceive auditory or visual hallucinations.         Mood and Affect: Mood and affect normal.         Speech: Speech normal.         Behavior: Behavior normal. Behavior is cooperative.         Thought Content: Thought content  normal. Thought content is not paranoid or delusional. Thought content does not include homicidal or suicidal ideation. Thought content does not include homicidal or suicidal plan.         Cognition and Memory: Cognition and memory normal.         Judgment: Judgment normal.           Mental Status Exam:   Hygiene:   good  Cooperation:  Cooperative  Eye Contact:  Good  Psychomotor Behavior:  Appropriate  Affect:  Appropriate  Mood: normal  Speech:  Normal  Thought Process:  Goal directed  Thought Content:  Normal  Suicidal:  None  Homicidal:  None  Hallucinations:  None  Delusion:  None  Memory:  Intact  Orientation:  Person, Place, Time and Situation  Reliability:  good  Insight:  Good  Judgement:  Good  Impulse Control:  Good  Physical/Medical Issues:  No        [facility] request number [id number] reviewed by this APRN at today's encounter.    Previous Provider notes and available records reviewed by this APRN at today's encounter.         Lab Results:   Office Visit on 07/15/2022   Component Date Value Ref Range Status   • Rapid Influenza A Ag 07/15/2022 Negative  Negative Final   • Rapid Influenza B Ag 07/15/2022 Negative  Negative Final   • Internal Control 07/15/2022 Passed  Passed Final   • Lot Number 07/15/2022 [id number]   Final   • Expiration Date 07/15/2022 10/21/23   Final   • SARS-CoV-2 JOAQUIN 07/15/2022 Detected (A) Not Detected Final         Assessment & Plan   Problems Addressed this Visit    None     Visit Diagnoses     Attention deficit hyperactivity disorder, combined type  (Chronic)   -  Primary    Relevant Medications    amphetamine-dextroamphetamine XR (Adderall XR) 15 MG 24 hr capsule    amphetamine-dextroamphetamine (Adderall) 10 MG tablet    hydrOXYzine (ATARAX) 25 MG tablet    Situational anxiety        Relevant Medications    hydrOXYzine (ATARAX) 25 MG tablet      Diagnoses       Codes Comments    Attention deficit hyperactivity disorder, combined type    -  Primary ICD-10-CM: F90.2  ICD-9-CM: 314.01  "    Situational anxiety     ICD-10-CM: F41.8  ICD-9-CM: 300.09           Visit Diagnoses:    ICD-10-CM ICD-9-CM   1. Attention deficit hyperactivity disorder, combined type  F90.2 314.01   2. Situational anxiety  F41.8 300.09          GOALS:  Short Term Goals: Patient will be compliant with medication, and patient will have no significant medication related side effects.  Patient will be engaged in psychotherapy as indicated.  Patient will report subjective improvement of symptoms.  Long term goals: To stabilize mood and treat/improve subjective symptoms, the patient will stay out of the hospital, the patient will be at an optimal level of functioning, and the patient will take all medications as prescribed.  The patient verbalized understanding and agreement with goals that were mutually set.      TREATMENT PLAN:   -Continue Adderall XR 15 mg PO QAM for ADHD  -Continue Adderall 5-10 mg PO every afternoon PRN for ADHD  -Continue hydroxyzine 25-50 mg PO TID PRN for anxiety    Medication and treatment options, both pharmacological and non-pharmacological treatment options, discussed during today's visit, including any off label use of medication. Patient acknowledged and verbally consented with current treatment plan and was educated on the importance of compliance with treatment and follow-up appointments.        Controlled Substance Medication Contract    Controlled substance medications (i.e. benzodiazepines, opioids, amphetamines) are very useful, but have a high potential for misuse and are, therefore, closely controlled by local, state, and federal government(s). As a patient of [facility], you agree and understand the followin. I am responsible for the controlled substance medications prescribed to me. If my prescription is misplaced, stolen, or if \"I run out early,\" I understand this medication will not be replaced regardless of the circumstances.  2. Refills of controlled " "substance medications: (a) Will be made only during regular office hours Monday-Thursday once a month and during a scheduled virtual appointment. Refills will not be made at night, weekends, or on holidays. (b) Will not be made if \"I lost my prescriptions,\" \"ran out early,\" or \"misplaced my medication\". I am solely responsible for taking the medication as prescribed and for keeping track of the remaining.   3. I agree to comply with urine drug testing and pill counts at the provider's discretion, thereby, documenting the proper use of any medications. If alcohol abuse is suspected, a breathalyzer or blood alcohol level may be ordered. Unannounced urine or serum toxicology specimens may be requested and my cooperation is required.  4. I understand that if I violate any of the above conditions, my prescriptions for controlled medications my be terminated. If the violation involves obtaining these medications from another individual, or the concomitant use of non-prescription illicit (illegal) drugs, I may also be reported to other providers, pharmacies, medical facilities and the appropriate authorities.   5. I further understand that if I violate this controlled substance contract due to non-compliance of medical directions, such as the failure in taking medications as prescribed, utilizing other illicit drugs, or abuse of controlled medications, the prescription for controlled medications may be terminated.   6. I agree to keep my scheduled appointments and conduct myself in a courteous manner.  7. I agree not to sell, share, or give any medication to another person. I understand that such mishandling of my medication is a serious violation of this agreement and would result in my treatment being terminated without any recourse for appeal.   8. I agree not to take my medication from any physicians, nurse practitioners, pharmacies or other sources without telling my prescriber.  9. I agree to take my medication as my " prescriber has instructed and not to alter the way I take my medication without first consulting my prescriber.  10. I agree to abstain from problematic alcohol usage, opioids, marijuana, cocaine, and other addictive substances.   11. If I am legally involved related to legal or illegal drugs, including alcohol, refill of controlled substances will not be given until a re-evaluation of my chemical dependency treatment plan has been completed. Refills are at the discretion of the prescriber.   12. I agree to fill all of my controlled medications at an in-state ([place]) pharmacy.   13. I understand that [facility] utilizes the [place] All Schedule Prescription Electronic Reporting (GO) system and will monitor my prescription history via this source.  14. Benzodiazepines are drugs prescribed to treat conditions like anxiety, insomnia, and seizures. Examples of these drugs include: alprazolam, clonazepam, diazepam, and lorazepam. The FDA has applied a Black Box Warning (one of the strictest warnings) that the use of opioids and benzodiazepines together have serious risks to include unusual dizziness or lightheadedness, extreme sleepiness, slowed or difficult breathing, coma and death. There is an added risk if alcohol is also ingested. It is the policy of [facility] to NOT prescribe benzodiazepines to patients who also use opioids. If a patient already presents already prescribed both, the prescriber my direct the patient to their previous provider who prescribed it or taper the benzodiazepine as part of the treatment plan. These patients must be monitored at appropriate intervals and so visits may be more frequent.     This APRN has discussed and reviewed this information with the patient and/or guardian. The patient and/or guardian verbally agreed (no signatures are obtained during today's visit as they are a telehealth patient and is unable to print  and sign this document, therefore, verbal agreement has been obtained).       MEDICATION ISSUES:    Discussed treatment plan and medication options of prescribed medication as well as the risks, benefits, any black box warnings, and side effects including potential falls, possible impaired driving, and metabolic adversities among others, including any off label use of medication. Patient is agreeable to call the office with any worsening of symptoms or onset of side effects, or if any concerns or questions arise.  The contact information for the office is made available to the patient. Patient is agreeable to call 911 or go to the nearest ER should they begin having any SI/HI, or if any urgent concerns arise. No medication side effects or related complaints today.       MEDS ORDERED DURING VISIT:  New Medications Ordered This Visit   Medications   • amphetamine-dextroamphetamine XR (Adderall XR) 15 MG 24 hr capsule     Sig: Take 1 capsule by mouth Every Morning     Dispense:  30 capsule     Refill:  0   • amphetamine-dextroamphetamine (Adderall) 10 MG tablet     Sig: Take 1/2-1 tablet PO every afternoon PRN     Dispense:  30 tablet     Refill:  0     Pt is also taking Adderall XR   • hydrOXYzine (ATARAX) 25 MG tablet     Sig: Take 1-2 tablets PO TID PRN for anxiety/sleep     Dispense:  90 tablet     Refill:  0       Return in about 12 weeks (around 10/19/2022), or if symptoms worsen or fail to improve, for Recheck.     Treatment plan completed on 1/31/22    Progress toward goal: Not at goal    Functional Status: Mild impairment     Prognosis: Good with Ongoing Treatment         This document has been electronically signed by MICHEL Guerrero  July 27, 2022 15:18 EDT     Some of the data in this electronic note has been brought forward from a previous encounter, any necessary changes have been made, it has been reviewed by this APRN, and it is accurate.    Please note that portions of this note were completed with  a voice recognition program. Efforts were made to edit dictation, but occasionally words are mistranscribed.

## 2022-08-29 DIAGNOSIS — F90.2 ATTENTION DEFICIT HYPERACTIVITY DISORDER, COMBINED TYPE: Chronic | ICD-10-CM

## 2022-08-29 RX ORDER — DEXTROAMPHETAMINE SACCHARATE, AMPHETAMINE ASPARTATE, DEXTROAMPHETAMINE SULFATE AND AMPHETAMINE SULFATE 2.5; 2.5; 2.5; 2.5 MG/1; MG/1; MG/1; MG/1
TABLET ORAL
Qty: 30 TABLET | Refills: 0 | Status: SHIPPED | OUTPATIENT
Start: 2022-08-29 | End: 2022-10-01 | Stop reason: SDUPTHER

## 2022-08-29 RX ORDER — DEXTROAMPHETAMINE SACCHARATE, AMPHETAMINE ASPARTATE MONOHYDRATE, DEXTROAMPHETAMINE SULFATE AND AMPHETAMINE SULFATE 3.75; 3.75; 3.75; 3.75 MG/1; MG/1; MG/1; MG/1
15 CAPSULE, EXTENDED RELEASE ORAL EVERY MORNING
Qty: 30 CAPSULE | Refills: 0 | Status: SHIPPED | OUTPATIENT
Start: 2022-08-29 | End: 2022-10-01 | Stop reason: SDUPTHER

## 2022-10-01 DIAGNOSIS — F90.2 ATTENTION DEFICIT HYPERACTIVITY DISORDER, COMBINED TYPE: Chronic | ICD-10-CM

## 2022-10-01 DIAGNOSIS — F41.8 SITUATIONAL ANXIETY: ICD-10-CM

## 2022-10-03 RX ORDER — DEXTROAMPHETAMINE SACCHARATE, AMPHETAMINE ASPARTATE, DEXTROAMPHETAMINE SULFATE AND AMPHETAMINE SULFATE 2.5; 2.5; 2.5; 2.5 MG/1; MG/1; MG/1; MG/1
TABLET ORAL
Qty: 30 TABLET | Refills: 0 | Status: SHIPPED | OUTPATIENT
Start: 2022-10-03 | End: 2022-10-12

## 2022-10-03 RX ORDER — DEXTROAMPHETAMINE SACCHARATE, AMPHETAMINE ASPARTATE MONOHYDRATE, DEXTROAMPHETAMINE SULFATE AND AMPHETAMINE SULFATE 3.75; 3.75; 3.75; 3.75 MG/1; MG/1; MG/1; MG/1
15 CAPSULE, EXTENDED RELEASE ORAL EVERY MORNING
Qty: 30 CAPSULE | Refills: 0 | Status: SHIPPED | OUTPATIENT
Start: 2022-10-03 | End: 2022-10-12

## 2022-10-03 RX ORDER — HYDROXYZINE HYDROCHLORIDE 25 MG/1
TABLET, FILM COATED ORAL
Qty: 90 TABLET | Refills: 0 | Status: SHIPPED | OUTPATIENT
Start: 2022-10-03 | End: 2022-10-31

## 2022-10-12 ENCOUNTER — TELEMEDICINE (OUTPATIENT)
Dept: PSYCHIATRY | Facility: CLINIC | Age: 37
End: 2022-10-12

## 2022-10-12 DIAGNOSIS — F90.2 ATTENTION DEFICIT HYPERACTIVITY DISORDER, COMBINED TYPE: Primary | Chronic | ICD-10-CM

## 2022-10-12 PROCEDURE — 99213 OFFICE O/P EST LOW 20 MIN: CPT | Performed by: NURSE PRACTITIONER

## 2022-10-12 RX ORDER — DEXTROAMPHETAMINE SACCHARATE, AMPHETAMINE ASPARTATE, DEXTROAMPHETAMINE SULFATE AND AMPHETAMINE SULFATE 2.5; 2.5; 2.5; 2.5 MG/1; MG/1; MG/1; MG/1
TABLET ORAL
Qty: 30 TABLET | Refills: 0 | Status: SHIPPED | OUTPATIENT
Start: 2022-10-12 | End: 2022-12-18 | Stop reason: SDUPTHER

## 2022-10-12 RX ORDER — DEXTROAMPHETAMINE SACCHARATE, AMPHETAMINE ASPARTATE MONOHYDRATE, DEXTROAMPHETAMINE SULFATE AND AMPHETAMINE SULFATE 5; 5; 5; 5 MG/1; MG/1; MG/1; MG/1
20 CAPSULE, EXTENDED RELEASE ORAL EVERY MORNING
Qty: 30 CAPSULE | Refills: 0 | Status: SHIPPED | OUTPATIENT
Start: 2022-10-12 | End: 2022-11-09 | Stop reason: SDUPTHER

## 2022-10-12 NOTE — PROGRESS NOTES
This provider is located at the [facility] (through [facility]), [address], [facility], [address] using a secure [employer] Video Visit through [employer]. Patient is being seen remotely via telehealth at their home address in [place], and stated they are in a secure environment for this session. The patient's condition being diagnosed/treated is appropriate for telemedicine. The provider identified herself as well as her credentials.   The patient, and/or patients guardian, consent to be seen remotely, and when consent is given they understand that the consent allows for patient identifiable information to be sent to a third party as needed.   They may refuse to be seen remotely at any time. The electronic data is encrypted and password protected, and the patient and/or guardian has been advised of the potential risks to privacy not withstanding such measures.    You have chosen to receive care through a telehealth visit.  Do you consent to use a video/audio connection for your medical care today? Yes        Subjective   Honey Thomas is a 37 y.o. female who presents today for follow up    Chief Complaint:  ADHD     Accompanied by: Pt was alone for duration of appointment    History of Present Illness:   Pt reports she has been doing fairly well these past few months. Pt continues to work two jobs and is finishing her basement. Pt has received her money back from the man who was supposed to do her pool. Pt found someone else who installed her pool and it was half the cost. They saved so much money they are using the rest for her basement. ADHD reports the Adderall XR is no longer as effective and isn't lasting as long as is was previously. Mood has been stable as has appetite and sleep. The patient denies any new medical problems or changes in medications since last appointment with this facility. The patient reports compliance with current medication regimen. The  patient denies any current side effects from their current medication regimen. The patient denies any abnormal muscle movements or tics. The patient would like to increase their medications at this visit. The patient denies any suicidal or homicidal ideations, plans, or intent at today's encounter and is convincing. The patient denies any auditory hallucinations or visual hallucinations. The patient does not endorse any significant symptoms consistent with lulu or psychosis at today's encounter.     *If the patient has any concerns or needs assistance, they may call the [facility] at [phone]*          Prior Psychiatric Medications:  Buspar: pt struggled to remember to take multiple times a day  Zoloft: ineffective  Wellbutrin XL: ineffective  Paxil: ineffective  Strattera: nausea, irritability        The following portions of the patient's history were reviewed and updated as appropriate: allergies, current medications, past family history, past medical history, past social history, past surgical history and problem list.          Past Medical History:  Past Medical History:   Diagnosis Date   • Allergic    • Anxiety    • BPPV (benign paroxysmal positional vertigo)    • Carpal tunnel syndrome 2010    bilat   • TMJ (dislocation of temporomandibular joint) 2/4/2021   • Vitamin D deficiency        Social History:  Social History     Socioeconomic History   • Marital status:    Tobacco Use   • Smoking status: Never   • Smokeless tobacco: Never   Substance and Sexual Activity   • Alcohol use: Yes     Comment: social   • Drug use: No   • Sexual activity: Yes     Partners: Male     Birth control/protection: Surgical       Family History:  Family History   Problem Relation Age of Onset   • Hypertension Father    • Diabetes Father    • Hypertension Mother    • Ovarian cancer Mother 37   • Graves' disease Sister    • Scoliosis Sister    • Sleep apnea Sister    • Lung cancer Maternal  Grandmother    • Alzheimer's disease Paternal Grandmother    • Other Paternal Grandfather         accident   • Autism Niece        Past Surgical History:  Past Surgical History:   Procedure Laterality Date   • ABDOMINOPLASTY N/A 2019    Procedure: ABDOMINOPLASTY;  Surgeon: Alphonso Vasquez MD;  Location:  [facility];  Service: Plastics   • BREAST AUGMENTATION Bilateral 2019    Procedure: BREAST AUGMENTATION WITH IMPLANTS BILATERAL;  Surgeon: Alphonso Vasquez MD;  Location:  [facility];  Service: Plastics   • FRACTURE SURGERY Left 1995    lEFT FOREARM   • TOTAL LAPAROSCOPIC HYSTERECTOMY SALPINGO OOPHORECTOMY N/A 2019    Procedure: TOTAL ROBOTIC HYSTERECTOMY WITH BILATERAL SALPINGO-OOPHORECTOMY;  Surgeon: Summer Dorsey DO;  Location:  [facility];  Service: DaVinci   • WISDOM TOOTH EXTRACTION         Problem List:  Patient Active Problem List   Diagnosis   •  (normal spontaneous vaginal delivery)   • Vitamin D deficiency   • Anxiety   • BPPV (benign paroxysmal positional vertigo)   • Carpal tunnel syndrome   • TMJ (dislocation of temporomandibular joint)   • Family history of ovarian cancer   • Mold exposure       Allergy:   No Known Allergies     Current Medications:   Current Outpatient Medications   Medication Sig Dispense Refill   • amphetamine-dextroamphetamine (Adderall) 10 MG tablet Take 1/2-1 tablet PO every afternoon PRN 30 tablet 0   • amphetamine-dextroamphetamine XR (ADDERALL XR) 20 MG 24 hr capsule Take 1 capsule by mouth Every Morning 30 capsule 0   • cyclobenzaprine (FLEXERIL) 10 MG tablet Take 1 tablet by mouth 3 (Three) Times a Day As Needed for Muscle Spasms. 30 tablet 1   • estradiol (ESTRACE) 1 MG tablet TAKE 1 TABLET BY MOUTH EVERY DAY 14 tablet 0   • hydrOXYzine (ATARAX) 25 MG tablet Take 1-2 tablets PO TID PRN for anxiety/sleep 90 tablet 0   • indomethacin (INDOCIN) 50 MG capsule Take 1 capsule by mouth 3 (Three) Times a Day As Needed for Mild Pain . 30 capsule 0   •  omeprazole (PrilOSEC) 20 MG capsule Take 1 capsule by mouth Daily. 90 capsule 3   • ondansetron ODT (Zofran ODT) 4 MG disintegrating tablet Place 1 tablet on the tongue Every 8 (Eight) Hours As Needed for Nausea or Vomiting. 20 tablet 0     No current facility-administered medications for this visit.       Review of Symptoms:    Review of Systems   Constitutional: Negative.    Psychiatric/Behavioral: Positive for decreased concentration.         Physical Exam:   Due to the remote nature of this encounter (virtual encounter), vitals were unable to be obtained.  Height stated at 66 inches.  Weight stated at 171 pounds.         Physical Exam  Neurological:      Mental Status: She is alert.   Psychiatric:         Attention and Perception: Attention and perception normal. She does not perceive auditory or visual hallucinations.         Mood and Affect: Mood and affect normal.         Speech: Speech normal.         Behavior: Behavior normal. Behavior is cooperative.         Thought Content: Thought content normal. Thought content is not paranoid or delusional. Thought content does not include homicidal or suicidal ideation. Thought content does not include homicidal or suicidal plan.         Cognition and Memory: Cognition and memory normal.         Judgment: Judgment normal.           Mental Status Exam:   Hygiene:   good  Cooperation:  Cooperative  Eye Contact:  Good  Psychomotor Behavior:  Appropriate  Affect:  Appropriate  Mood: euthymic  Speech:  Normal  Thought Process:  Linear  Thought Content:  Normal  Suicidal:  None  Homicidal:  None  Hallucinations:  None  Delusion:  None  Memory:  Intact  Orientation:  Person, Place, Time and Situation  Reliability:  good  Insight:  Good  Judgement:  Good  Impulse Control:  Good  Physical/Medical Issues:  No        GO request number [id number] reviewed by this APRN at today's encounter.    Previous Provider notes and available records reviewed by this APRN at today's  encounter.         Lab Results:   No visits with results within 1 Month(s) from this visit.   Latest known visit with results is:   Office Visit on 07/15/2022   Component Date Value Ref Range Status   • Rapid Influenza A Ag 07/15/2022 Negative  Negative Final   • Rapid Influenza B Ag 07/15/2022 Negative  Negative Final   • Internal Control 07/15/2022 Passed  Passed Final   • Lot Number 07/15/2022 [id number]   Final   • Expiration Date 07/15/2022 10/21/23   Final   • SARS-CoV-2 JOAQUIN 07/15/2022 Detected (C)  Not Detected Final         Assessment & Plan   Problems Addressed this Visit    None  Visit Diagnoses     Attention deficit hyperactivity disorder, combined type  (Chronic)   -  Primary    Relevant Medications    amphetamine-dextroamphetamine (Adderall) 10 MG tablet    amphetamine-dextroamphetamine XR (ADDERALL XR) 20 MG 24 hr capsule      Diagnoses       Codes Comments    Attention deficit hyperactivity disorder, combined type    -  Primary ICD-10-CM: F90.2  ICD-9-CM: 314.01           Visit Diagnoses:    ICD-10-CM ICD-9-CM   1. Attention deficit hyperactivity disorder, combined type  F90.2 314.01          GOALS:  Short Term Goals: Patient will be compliant with medication, and patient will have no significant medication related side effects.  Patient will be engaged in psychotherapy as indicated.  Patient will report subjective improvement of symptoms.  Long term goals: To stabilize mood and treat/improve subjective symptoms, the patient will stay out of the hospital, the patient will be at an optimal level of functioning, and the patient will take all medications as prescribed.  The patient verbalized understanding and agreement with goals that were mutually set.      TREATMENT PLAN:   -Increase Adderall XR to 20 mg PO QAM for ADHD  -Continue Adderall 5-10 mg PO every afternoon PRN for ADHD  -Continue hydroxyzine 25-50 mg PO TID PRN for anxiety    *Pt is a 12 week follow-up due to cost of appointments. Pt knows she  is to call if she has any questions or concerns*    Medication and treatment options, both pharmacological and non-pharmacological treatment options, discussed during today's visit, including any off label use of medication. Patient acknowledged and verbally consented with current treatment plan and was educated on the importance of compliance with treatment and follow-up appointments.        MEDICATION ISSUES:    Discussed treatment plan and medication options of prescribed medication as well as the risks, benefits, any black box warnings, and side effects including potential falls, possible impaired driving, and metabolic adversities among others, including any off label use of medication. Patient is agreeable to call the office with any worsening of symptoms or onset of side effects, or if any concerns or questions arise.  The contact information for the office is made available to the patient. Patient is agreeable to call 911 or go to the nearest ER should they begin having any SI/HI, or if any urgent concerns arise. No medication side effects or related complaints today.       MEDS ORDERED DURING VISIT:  New Medications Ordered This Visit   Medications   • amphetamine-dextroamphetamine (Adderall) 10 MG tablet     Sig: Take 1/2-1 tablet PO every afternoon PRN     Dispense:  30 tablet     Refill:  0     Pt is also taking Adderall XR   • amphetamine-dextroamphetamine XR (ADDERALL XR) 20 MG 24 hr capsule     Sig: Take 1 capsule by mouth Every Morning     Dispense:  30 capsule     Refill:  0     Pt is also taking Adderall IR       Return in about 12 weeks (around 1/4/2023), or if symptoms worsen or fail to improve, for Recheck.     Treatment plan completed on 1/31/22    Progress toward goal: Not at goal    Functional Status: Mild impairment     Prognosis: Good with Ongoing Treatment         This document has been electronically signed by MICHEL Guerrero  October 12, 2022 10:33 EDT     Some of the data in this  electronic note has been brought forward from a previous encounter, any necessary changes have been made, it has been reviewed by this APRN, and it is accurate.    Please note that portions of this note were completed with a voice recognition program. Efforts were made to edit dictation, but occasionally words are mistranscribed.

## 2022-10-31 DIAGNOSIS — F41.8 SITUATIONAL ANXIETY: ICD-10-CM

## 2022-10-31 RX ORDER — HYDROXYZINE HYDROCHLORIDE 25 MG/1
TABLET, FILM COATED ORAL
Qty: 90 TABLET | Refills: 0 | Status: SHIPPED | OUTPATIENT
Start: 2022-10-31 | End: 2023-02-14 | Stop reason: SDUPTHER

## 2022-11-04 ENCOUNTER — CLINICAL SUPPORT (OUTPATIENT)
Dept: GENETICS | Facility: HOSPITAL | Age: 37
End: 2022-11-04

## 2022-11-04 DIAGNOSIS — Z80.41 FAMILY HISTORY OF OVARIAN CANCER: ICD-10-CM

## 2022-11-04 DIAGNOSIS — Z13.79 GENETIC TESTING: Primary | ICD-10-CM

## 2022-11-04 DIAGNOSIS — Z80.3 FAMILY HISTORY OF BREAST CANCER: ICD-10-CM

## 2022-11-04 NOTE — PROGRESS NOTES
Honey Thomas, a 37-year-old female, was referred for genetic counseling due to a family history of ovarian cancer. Genetic counseling was performed via telephone. Ms. Thomas confirmed her name, date of birth, and that she was currently in [place] at the time of the appointment. Ms. Thomas has no personal history of cancer. She was 10 at menarche and had her first child at age 20. She is post-menopausal and had a GLENN-BSO at 34 due to family history of ovarian cancer. Ms. Thomas has had one mammogram in 2014/2015 due to a suspicious lump, but the mammogram was benign. She has not yet started colonoscopies. She was interested in discussing her risk for a hereditary cancer syndrome. Ms. Thomas decided to pursue comprehensive genetic testing to evaluate her risk of cancer, therefore the CancerNext-Expanded Panel was ordered through [employer] which analyzes BRCA1/2 and 75 additional genes associated with an increased cancer risk. A saliva kit will be mailed to her house. Results are expected 2-3 weeks after [employer] receives her sample.      PERTINENT FAMILY HISTORY: (See attached pedigree)   Mother:   Ovarian cancer, 37   Mat. Uncle:    Non-Hodgkin’s Lymphoma, 61  Mat. Grandmother:   Lung cancer, early 40s, recurrence at 55  Mat. Grandfather:   Lung cancer, 50  Mat. Great-Aunt:   Breast cancer, d.30s  Pat. Aunt:    Thyroid cancer, 26  Pat. Uncle 1:    Lung cancer, 65  Pat. Uncle 2:    Melanoma, 74    We do not have medical records regarding any of these diagnoses.     RISK ASSESSMENT:  Ms. Thomas’s family history of breast cancer raises the question of a hereditary cancer syndrome.  NCCN guidelines for genetic testing for BRCA1/2 states that individuals with a close relative diagnosed with ovarian cancer may consider genetic testing. Based on Ms. Thomas’s mother’s diagnosis of ovarian cancer, she would clearly meet this criteria. This risk assessment is based on the family history information provided at the time of the  appointment.  The assessment could change in the future should new information be obtained.    GENETIC COUNSELING (30 minutes):  We reviewed the family history information in detail. Cases of cancer follow three general patterns: sporadic, familial, and hereditary.  While most cancer is sporadic, some cases appear to occur in family clusters.  These cases are said to be familial and account for 10-20% of cancer cases.  Familial cases may be due to a combination of shared genes and environmental factors among family members.  In even fewer cases, the risk for cancer is inherited, and the genes responsible for the increased cancer risk are known.       Family histories typical of hereditary cancer syndromes usually include multiple first- and second-degree relatives diagnosed with cancer types that define a syndrome.  These cases tend to be diagnosed at younger-than-expected ages and can be bilateral or multifocal.  The cancer in these families follows an autosomal dominant inheritance pattern, which indicates the likely presence of a mutation in a cancer susceptibility gene.  Children and siblings of an individual believed to carry this mutation have a 50% chance of inheriting that mutation, thereby inheriting the increased risk to develop cancer.  These mutations can be passed down from the maternal or the paternal lineage.     Due to Ms. Thomas’s family history of ovarian cancer, we discussed hereditary breast and ovarian cancer. Hereditary breast cancer accounts for 5-10% of all cases of breast cancer.  A significant proportion of hereditary breast cancer can be attributed to mutations in the BRCA1 and BRCA2 genes.  Mutations in these genes confer an increased risk for breast cancer, ovarian cancer, male breast cancer, prostate cancer, and pancreatic cancer.  Women with a BRCA1 or BRCA2 mutation have up to an 87% lifetime risk of breast cancer and up to a 44% risk of ovarian cancer.  There are other clinically  significant breast cancer related genes in addition to BRCA1/2.      There are other genes that are known to be associated with an increased risk for cancer.  Some of these genes have well defined cancer risks and established management guidelines.  Other genes that can be tested for have been more recently described, and there may be less data regarding the risks and therefore may not have established management guidelines. We discussed these limitations at length.  Based on Ms. Thomas’s desire to get as much information as possible regarding her personal risks and potential risks for her family, she opted to pursue testing through a panel that would look at several other genes known to increase the risk for cancer.     GENETIC TESTING:  The risks, benefits, and limitations of genetic testing and implications for clinical management following testing were reviewed.  DNA test results can influence decisions regarding screening and prevention.  Genetic testing can have significant psychological implications for both individuals and families. Also discussed was the possibility of employment and insurance discrimination based on genetic test results and the laws in place to prevent this, as well as the limitations of these laws.       We discussed panel testing, which would involve testing 77 genes associated with increased cancer risk. The implications of a positive or negative test result were discussed.  We also discussed the importance of testing an affected relative and how a negative result for Ms. Thomas wouldn’t necessarily mean the cancers presenting in her family weren’t due to a genetic mutation that Ms. Thomas did not inherit.  In general, a negative genetic test result is most informative if a mutation has first been established in an affected member of the family.  In cases where an affected individual is not available or interested in testing, it is appropriate to offer testing to an unaffected individual.      We discussed the possibility that, in some cases, genetic test results may be ambiguous due to the identification of a genetic variant of uncertain significance (VUS). These variants may or may not be associated with an increased cancer risk. With multigene panel testing, it is not uncommon for a VUS to be identified.  If a VUS is identified, testing family members is typically not recommended and screening recommendations are made based on the family history.  The laboratories that perform genetic testing work to reclassify the VUS and send out an amended report if and when a VUS is reclassified.  The majority of variant findings are ultimately reclassified to a negative result. Given Ms. Thomas’s family history, a negative test result does not eliminate all cancer risk, although the risk may not be as high as it would with positive genetic testing.     PLAN: Genetic testing was ordered via the CancerNext-Expanded Panel through [employer] Results are expected 2-3 weeks after [employer] receives the sample. If she has any questions in the meantime, she is welcome to call me at [phone].      Haley Pink, MS, [facility], [facility]  Licensed Certified Genetic Counselor

## 2022-11-08 ENCOUNTER — TELEPHONE (OUTPATIENT)
Dept: PSYCHIATRY | Facility: CLINIC | Age: 37
End: 2022-11-08

## 2022-11-08 NOTE — TELEPHONE ENCOUNTER
Can you see if she can call to another [facility] to see if they have 20 mg in stock? I increased it last appointment to 20 mg and do not feel comfortable sending in 25 mg. 
Patient called said she did not    Adderall XR 20mg as it is on back order at pharmacy. Pharmacy does have 25mg would you mind sending in order for this?   I have not been able to confirm that patient did not pick this up as I have [facility] more than [facility] and cannot get an answer. Cannot get a live person on phone.    Please Advise?    Thank You  
Patient to make some calls to see if she can find a pharmacy that can fill 20mg Adderall XR She will call the office back with info if she finds one.  
Please let me know if she finds another pharmacy. 
Ejection Fraction...

## 2022-11-09 DIAGNOSIS — F90.2 ATTENTION DEFICIT HYPERACTIVITY DISORDER, COMBINED TYPE: Chronic | ICD-10-CM

## 2022-11-09 RX ORDER — DEXTROAMPHETAMINE SACCHARATE, AMPHETAMINE ASPARTATE MONOHYDRATE, DEXTROAMPHETAMINE SULFATE AND AMPHETAMINE SULFATE 5; 5; 5; 5 MG/1; MG/1; MG/1; MG/1
20 CAPSULE, EXTENDED RELEASE ORAL EVERY MORNING
Qty: 30 CAPSULE | Refills: 0 | Status: SHIPPED | OUTPATIENT
Start: 2022-11-09 | End: 2022-12-18 | Stop reason: SDUPTHER

## 2022-11-09 NOTE — TELEPHONE ENCOUNTER
Patient was not able to   the Addrerall XR 20mg that was sent in on 10/12/22 (Back Ordered - I confirmed it was not picked up and cancelled order at [facility])    Will you please Re-send to [facility] (Tagged In This Note)     Please Advise?    Thank You    (Provider Out Of Office Today - Provider Agreed To Re-send once patient found a pharmacy that could fill it)

## 2022-12-16 ENCOUNTER — OFFICE VISIT (OUTPATIENT)
Dept: FAMILY MEDICINE CLINIC | Facility: CLINIC | Age: 37
End: 2022-12-16

## 2022-12-16 ENCOUNTER — LAB (OUTPATIENT)
Dept: LAB | Facility: HOSPITAL | Age: 37
End: 2022-12-16

## 2022-12-16 VITALS
HEIGHT: 66 IN | SYSTOLIC BLOOD PRESSURE: 128 MMHG | TEMPERATURE: 98.4 F | BODY MASS INDEX: 25.07 KG/M2 | WEIGHT: 156 LBS | RESPIRATION RATE: 21 BRPM | DIASTOLIC BLOOD PRESSURE: 84 MMHG | OXYGEN SATURATION: 99 % | HEART RATE: 100 BPM

## 2022-12-16 DIAGNOSIS — R20.0 NUMBNESS AND TINGLING IN BOTH HANDS: ICD-10-CM

## 2022-12-16 DIAGNOSIS — R20.2 NUMBNESS AND TINGLING OF BOTH FEET: ICD-10-CM

## 2022-12-16 DIAGNOSIS — R20.0 NUMBNESS AND TINGLING OF BOTH FEET: ICD-10-CM

## 2022-12-16 DIAGNOSIS — M54.2 NECK PAIN, MUSCULOSKELETAL: Primary | ICD-10-CM

## 2022-12-16 DIAGNOSIS — R20.2 NUMBNESS AND TINGLING IN BOTH HANDS: ICD-10-CM

## 2022-12-16 DIAGNOSIS — Z79.890 HORMONE REPLACEMENT THERAPY: ICD-10-CM

## 2022-12-16 DIAGNOSIS — M25.50 POLYARTHRALGIA: ICD-10-CM

## 2022-12-16 DIAGNOSIS — G56.03 BILATERAL CARPAL TUNNEL SYNDROME: ICD-10-CM

## 2022-12-16 LAB
C3 SERPL-MCNC: 126 MG/DL (ref 82–167)
C4 SERPL-MCNC: 23 MG/DL (ref 14–44)
CANCER AG125 SERPL QL: 6.3 U/ML (ref 0–38.1)
CHROMATIN AB SERPL-ACNC: <10 IU/ML (ref 0–14)
CRP SERPL-MCNC: <0.3 MG/DL (ref 0–0.5)
ERYTHROCYTE [SEDIMENTATION RATE] IN BLOOD: 8 MM/HR (ref 0–20)
FOLATE SERPL-MCNC: 9.23 NG/ML (ref 4.78–24.2)
IRON 24H UR-MRATE: 145 MCG/DL (ref 37–145)
IRON SATN MFR SERPL: 29 % (ref 20–50)
TIBC SERPL-MCNC: 492 MCG/DL (ref 298–536)
TRANSFERRIN SERPL-MCNC: 330 MG/DL (ref 200–360)
VIT B12 BLD-MCNC: 717 PG/ML (ref 211–946)

## 2022-12-16 PROCEDURE — 86235 NUCLEAR ANTIGEN ANTIBODY: CPT

## 2022-12-16 PROCEDURE — 86431 RHEUMATOID FACTOR QUANT: CPT

## 2022-12-16 PROCEDURE — 86304 IMMUNOASSAY TUMOR CA 125: CPT

## 2022-12-16 PROCEDURE — 86160 COMPLEMENT ANTIGEN: CPT

## 2022-12-16 PROCEDURE — 99214 OFFICE O/P EST MOD 30 MIN: CPT | Performed by: NURSE PRACTITIONER

## 2022-12-16 PROCEDURE — 86140 C-REACTIVE PROTEIN: CPT

## 2022-12-16 PROCEDURE — 82746 ASSAY OF FOLIC ACID SERUM: CPT

## 2022-12-16 PROCEDURE — 36415 COLL VENOUS BLD VENIPUNCTURE: CPT

## 2022-12-16 PROCEDURE — 84466 ASSAY OF TRANSFERRIN: CPT

## 2022-12-16 PROCEDURE — 83540 ASSAY OF IRON: CPT

## 2022-12-16 PROCEDURE — 85652 RBC SED RATE AUTOMATED: CPT

## 2022-12-16 PROCEDURE — 82607 VITAMIN B-12: CPT

## 2022-12-16 RX ORDER — NAPROXEN 500 MG/1
500 TABLET ORAL 2 TIMES DAILY WITH MEALS
Qty: 60 TABLET | Refills: 2 | Status: SHIPPED | OUTPATIENT
Start: 2022-12-16

## 2022-12-16 NOTE — PROGRESS NOTES
"Chief Complaint  Neck Pain (Patient has left side neck pain that radiates to the shoulders.), Back Pain (Patient is having generalized back pain between the shoulder blades that has been going on for months. ), and Numbness (Pt has been having hand numbness and swelling. Pt states sometimes it is impossible to make a fist. )    Subjective          Honey Thomas presents to [facility]  History of Present Illness  Patient is a 37-year-old female.  She is here for follow-up for neck pain on left side radiates into her shoulders, back pain generalized between her shoulder blades has been ongoing for several months.  She is having some numbness and tingling to her hands.  States it is impossible to make a fist.  She states\" I work at my regular job and then when I get off I have been helping with construction/ remodeling our basement. She is also having an increase in finger and toe numbness.   She is on a hormone replacement and is requesting a  lab test.   She has multiple pain issues.   Discussed taking a antiinflammatory. Will check labs. XR of cervical spine.         The following portions of the patient's history were reviewed and updated as appropriate: allergies, current medications, past family history, past medical history, past social history, past surgical history and problem list.    Review of Systems   Constitutional: Negative.    HENT: Negative.    Respiratory: Negative.    Cardiovascular: Negative.    Gastrointestinal: Negative.    Genitourinary: Negative.    Musculoskeletal: Positive for arthralgias, myalgias and neck pain.   Skin: Negative.    Allergic/Immunologic: Negative.    Neurological: Positive for numbness.   Hematological: Negative.    Psychiatric/Behavioral: Negative.          Objective   Vital Signs:   /84   Pulse 100   Temp 98.4 °F (36.9 °C) (Temporal)   Resp 21   Ht 167.6 cm (65.98\")   Wt 70.8 kg (156 lb)   SpO2 99%   BMI 25.19 kg/m²  "           Physical Exam  Vitals reviewed.   HENT:      Head: Normocephalic.      Mouth/Throat:      Mouth: Mucous membranes are moist.   Eyes:      Pupils: Pupils are equal, round, and reactive to light.   Cardiovascular:      Rate and Rhythm: Regular rhythm. Tachycardia present.      Pulses: Normal pulses.   Pulmonary:      Effort: Pulmonary effort is normal.      Breath sounds: Normal breath sounds.   Abdominal:      General: Bowel sounds are normal.      Palpations: Abdomen is soft.   Musculoskeletal:      Right shoulder: Tenderness present.      Left shoulder: Tenderness present.      Right wrist: Tenderness present. Decreased range of motion.      Left wrist: Tenderness present. Decreased range of motion.      Right hand: Tenderness present. Decreased range of motion.      Left hand: Tenderness present. Decreased range of motion.      Cervical back: Spasms and tenderness present. Decreased range of motion.      Lumbar back: No tenderness or bony tenderness. Negative right straight leg raise test and negative left straight leg raise test.   Skin:     General: Skin is warm and dry.      Capillary Refill: Capillary refill takes less than 2 seconds.   Neurological:      Mental Status: She is alert and oriented to person, place, and time.   Psychiatric:         Mood and Affect: Mood normal.         Behavior: Behavior normal.         Thought Content: Thought content normal.        Result Review :                  Assessment and Plan    Diagnoses and all orders for this visit:    1. Neck pain, musculoskeletal (Primary)  -     XR Spine Cervical Complete 4 or 5 View; Future  -     naproxen (Naprosyn) 500 MG tablet; Take 1 tablet by mouth 2 (Two) Times a Day With Meals.  Dispense: 60 tablet; Refill: 2    2. Bilateral carpal tunnel syndrome  -     naproxen (Naprosyn) 500 MG tablet; Take 1 tablet by mouth 2 (Two) Times a Day With Meals.  Dispense: 60 tablet; Refill: 2    3. Numbness and tingling in both hands  -      Vitamin B12; Future  -     Folate; Future  -     Iron Profile; Future    4. Numbness and tingling of both feet  -     Vitamin B12; Future  -     Folate; Future  -     Iron Profile; Future    5. Polyarthralgia  -     Rheumatoid Factor; Future  -     Sedimentation Rate; Future  -     Sjogren's Antibody, Anti-SS-A / -SS-B; Future  -     C-reactive Protein; Future  -     C4 Complement; Future  -     C3 Complement; Future    6. Hormone replacement therapy  -     ; Future    checking labs.   Starting naproxen prn   Follow up in 4 weeks.     Follow Up   Return in about 4 weeks (around 1/13/2023), or if symptoms worsen or fail to improve, for pain .  Patient was given instructions and counseling regarding her condition or for health maintenance advice. Please see specific information pulled into the AVS if appropriate.     FSA options   Would cover message therapy - message strong.     Answers for HPI/ROS submitted by the patient on 12/16/2022  Please describe your symptoms.: Neck and back pain. Stiffness snd pain in hands  Have you had these symptoms before?: Yes  How long have you been having these symptoms?: Greater than 2 weeks  Please list any medications you are currently taking for this condition.: Ibuprofen  What is the primary reason for your visit?: Other

## 2022-12-18 DIAGNOSIS — F90.2 ATTENTION DEFICIT HYPERACTIVITY DISORDER, COMBINED TYPE: Chronic | ICD-10-CM

## 2022-12-18 NOTE — PROGRESS NOTES
Let patient know her labs are normal.  It does not appear to have rheumatoid arthritis.  Your inflammatory markers are all negative.  Your  is normal.

## 2022-12-19 LAB
ENA SS-A AB SER-ACNC: <0.2 AI (ref 0–0.9)
ENA SS-B AB SER-ACNC: <0.2 AI (ref 0–0.9)

## 2022-12-19 RX ORDER — DEXTROAMPHETAMINE SACCHARATE, AMPHETAMINE ASPARTATE, DEXTROAMPHETAMINE SULFATE AND AMPHETAMINE SULFATE 2.5; 2.5; 2.5; 2.5 MG/1; MG/1; MG/1; MG/1
TABLET ORAL
Qty: 30 TABLET | Refills: 0 | Status: SHIPPED | OUTPATIENT
Start: 2022-12-19 | End: 2023-01-04 | Stop reason: SDUPTHER

## 2022-12-19 RX ORDER — DEXTROAMPHETAMINE SACCHARATE, AMPHETAMINE ASPARTATE MONOHYDRATE, DEXTROAMPHETAMINE SULFATE AND AMPHETAMINE SULFATE 5; 5; 5; 5 MG/1; MG/1; MG/1; MG/1
20 CAPSULE, EXTENDED RELEASE ORAL EVERY MORNING
Qty: 30 CAPSULE | Refills: 0 | Status: SHIPPED | OUTPATIENT
Start: 2022-12-19 | End: 2023-01-04 | Stop reason: DRUGHIGH

## 2023-01-04 ENCOUNTER — TELEMEDICINE (OUTPATIENT)
Dept: PSYCHIATRY | Facility: CLINIC | Age: 38
End: 2023-01-04
Payer: COMMERCIAL

## 2023-01-04 DIAGNOSIS — F41.8 SITUATIONAL ANXIETY: ICD-10-CM

## 2023-01-04 DIAGNOSIS — F90.2 ATTENTION DEFICIT HYPERACTIVITY DISORDER, COMBINED TYPE: Primary | Chronic | ICD-10-CM

## 2023-01-04 PROCEDURE — 99213 OFFICE O/P EST LOW 20 MIN: CPT | Performed by: NURSE PRACTITIONER

## 2023-01-04 RX ORDER — DEXTROAMPHETAMINE SACCHARATE, AMPHETAMINE ASPARTATE, DEXTROAMPHETAMINE SULFATE AND AMPHETAMINE SULFATE 2.5; 2.5; 2.5; 2.5 MG/1; MG/1; MG/1; MG/1
TABLET ORAL
Qty: 30 TABLET | Refills: 0 | Status: SHIPPED | OUTPATIENT
Start: 2023-01-04 | End: 2023-01-19 | Stop reason: SDUPTHER

## 2023-01-04 RX ORDER — DEXTROAMPHETAMINE SACCHARATE, AMPHETAMINE ASPARTATE MONOHYDRATE, DEXTROAMPHETAMINE SULFATE AND AMPHETAMINE SULFATE 6.25; 6.25; 6.25; 6.25 MG/1; MG/1; MG/1; MG/1
25 CAPSULE, EXTENDED RELEASE ORAL EVERY MORNING
Qty: 30 CAPSULE | Refills: 0 | Status: SHIPPED | OUTPATIENT
Start: 2023-01-04 | End: 2023-02-14 | Stop reason: SDUPTHER

## 2023-01-04 NOTE — TREATMENT PLAN
Multi-Disciplinary Problems (from Behavioral Health Treatment Plan)    Active Problems     Problem: ADHD (Adult)  Start Date: 01/04/23    Problem Details: The patient self-scales this problem as a 5 with 10 being the worst.      Goal Priority Start Date Expected End Date End Date    Patient will sustain attention and concentration to complete chores, and work responsibilites and increase positive interaction in all relationships. -- 01/04/23 -- --    Goal Details: Progress toward goal:  The patient self-scales their progress related to this goal as a 5 with 10 being the worst.      Goal Intervention Frequency Start Date End Date    Assist patient in setting responsible goals and breaking down large tasks. Q Month 01/04/23 --    Intervention Details: Duration of treatment until until discharged.      Goal Intervention Frequency Start Date End Date    Assist patient in using self monitoring checklist to improve attention, work performance, and social skills. Q Month 01/04/23 --    Intervention Details: Duration of treatment until until discharged.                         I have discussed and reviewed this treatment plan with the patient and/or guardian.  The patient has verbally agreed with this treatment plan (no signatures are obtained at today's visit as the patient is a telehealth patient and is unable to print and sign this document, therefore verbal agreement is obtained).

## 2023-01-04 NOTE — PROGRESS NOTES
This provider is located at the Behavioral Health [facility] (through [facility]), [address], [address] using a telephone in a secure private environment. The Patient is seen remotely at their home address in [place], using a private telephone.  The patient is unable to be seen through a MyChart Video Visit through [facility] at today's encounter because pt had poor service, therefore a telephone encounter was conducted. The patient is being evaluated/treated via telehealth by telephone, and stated they are in a secure environment for this session. The patient's condition being diagnosed/treated is appropriate for telemedicine. The provider identified herself as well as her credentials.   The patient, and/or patient's guardian, consent to be seen remotely, and when consent is given they understand that the consent allows for patient identifiable information to be sent to a third party as needed.   They may refuse to be seen remotely at any time. The electronic data is encrypted and password protected, and the patient and/or guardian has been advised of the potential risks to privacy not withstanding such measures.  Patient identifiers utilized: Name and date of birth.    This visit has been rescheduled as a phone visit to comply with patient safety concerns in accordance with CDC recommendations. Total time of discussion was 26 minutes.  You have chosen to receive care through a telephone visit. Do you consent to use a telephone visit for your medical care today? Yes      *Attempted to connect via video but pt had poor service. Had to call pt to conduct appointment*      Subjective   Honey Thomas is a 37 y.o. female who presents today for follow up    Chief Complaint:  ADHD     Accompanied by: Pt was alone for duration of appointment    History of Present Illness:   Pt reports she has had a few stressors since her last appointment. Less than a month after finishing her basement  they had a leak in their spare bathroom, which caused a lot of damage to her basement. They have had to file a claim with insurance. Pt also has to go to a board meeting due to the water company mislabeling her and her neighbor's water meter. They have been paying each other's bills. Per pt, the water company is expecting her to pay for their mistakes. Pt enjoyed [place] with her family. Pt states the Adderall XR 20 mg lasts a little longer but isn't any more effective. Pt continues to take the PRN dose in the afternoons on occasion. Pt struggles most when things are out of routine and she has stressors. It also occurs when she has external distractions (i.e. people are trying to talk to her when she is trying to work). Time management continues to be a problem and pt is forgetful. Pt's  brought to her attention that she rubs the nails of her fingers over the pad of her thumb. He told her she has been doing it since they have been dating, which was long before the Adderall XR. If pt's nails have S&S on them she doesn't do it. Pt takes the hydroxyzine PRN, mostly when she has pressure in her chest. Appetite and sleep are stable as is overall mood. Pt has lost weight since July from working on her basement. The patient reports compliance with current medication regimen. The patient denies any current side effects from their current medication regimen. The patient denies any abnormal muscle movements or tics. On the Adderall, patient rates their ADHD symptoms on average in the past week at a 5/10 on a 0-10 scale, with 10 being the worst. The patient would like to increase their medications at this visit. The patient denies any suicidal or homicidal ideations, plans, or intent at today's encounter and is convincing. The patient denies any auditory hallucinations or visual hallucinations. The patient does not endorse any significant symptoms consistent with lulu or psychosis at today's encounter.     *If the  patient has any concerns or needs assistance, they may call the [facility] at [phone]*        Prior Psychiatric Medications:  Buspar: pt struggled to remember to take multiple times a day  Zoloft: ineffective  Wellbutrin XL: ineffective  Paxil: ineffective  Strattera: nausea, irritability        The following portions of the patient's history were reviewed and updated as appropriate: allergies, current medications, past family history, past medical history, past social history, past surgical history and problem list.          Past Medical History:  Past Medical History:   Diagnosis Date   • Allergic    • Anxiety    • BPPV (benign paroxysmal positional vertigo)    • Carpal tunnel syndrome 2010    bilat   • TMJ (dislocation of temporomandibular joint) 2/4/2021   • Vitamin D deficiency        Social History:  Social History     Socioeconomic History   • Marital status:    Tobacco Use   • Smoking status: Former     Packs/day: 0.50     Years: 10.00     Pack years: 5.00     Types: Cigarettes   • Smokeless tobacco: Never   Vaping Use   • Vaping Use: Never used   Substance and Sexual Activity   • Alcohol use: Yes     Comment: 1 or less beers per week   • Drug use: No   • Sexual activity: Yes     Partners: Male     Birth control/protection: Surgical       Family History:  Family History   Problem Relation Age of Onset   • Hypertension Father    • Diabetes Father    • Hypertension Mother    • Ovarian cancer Mother 37   • Graves' disease Sister    • Scoliosis Sister    • Sleep apnea Sister    • Lung cancer Maternal Grandmother    • Alzheimer's disease Paternal Grandmother    • Other Paternal Grandfather         accident   • Autism Niece        Past Surgical History:  Past Surgical History:   Procedure Laterality Date   • ABDOMINOPLASTY N/A 11/25/2019    Procedure: ABDOMINOPLASTY;  Surgeon: Alphonso Vasquez MD;  Location: [facility];  Service: Plastics   • BREAST AUGMENTATION  Bilateral 2019    Procedure: BREAST AUGMENTATION WITH IMPLANTS BILATERAL;  Surgeon: Alphonso Vasquez MD;  Location:  [facility];  Service: Plastics   • COSMETIC SURGERY  19    Abdominoplasty; breast augmentation   • FRACTURE SURGERY Left 1995    lEFT FOREARM   • TOTAL LAPAROSCOPIC HYSTERECTOMY SALPINGO OOPHORECTOMY N/A 2019    Procedure: TOTAL ROBOTIC HYSTERECTOMY WITH BILATERAL SALPINGO-OOPHORECTOMY;  Surgeon: Summer Dorsey DO;  Location:  [facility];  Service: DaVinci   • WISDOM TOOTH EXTRACTION         Problem List:  Patient Active Problem List   Diagnosis   •  (normal spontaneous vaginal delivery)   • Vitamin D deficiency   • Anxiety   • BPPV (benign paroxysmal positional vertigo)   • Carpal tunnel syndrome   • TMJ (dislocation of temporomandibular joint)   • Family history of ovarian cancer   • Mold exposure       Allergy:   No Known Allergies     Current Medications:   Current Outpatient Medications   Medication Sig Dispense Refill   • amphetamine-dextroamphetamine (Adderall) 10 MG tablet Take 1/2-1 tablet PO every afternoon PRN 30 tablet 0   • amphetamine-dextroamphetamine XR (Adderall XR) 25 MG 24 hr capsule Take 1 capsule by mouth Every Morning 30 capsule 0   • cyclobenzaprine (FLEXERIL) 10 MG tablet Take 1 tablet by mouth 3 (Three) Times a Day As Needed for Muscle Spasms. 30 tablet 1   • estradiol (ESTRACE) 1 MG tablet TAKE 1 TABLET BY MOUTH EVERY DAY 14 tablet 0   • hydrOXYzine (ATARAX) 25 MG tablet TAKE 1-2 TABLETS BY MOUTH 3 TIMES DAILY AS NEEDED FOR ANXIETY/SLEEP 90 tablet 0   • naproxen (Naprosyn) 500 MG tablet Take 1 tablet by mouth 2 (Two) Times a Day With Meals. 60 tablet 2   • omeprazole (PrilOSEC) 20 MG capsule Take 1 capsule by mouth Daily. 90 capsule 3   • ondansetron ODT (Zofran ODT) 4 MG disintegrating tablet Place 1 tablet on the tongue Every 8 (Eight) Hours As Needed for Nausea or Vomiting. 20 tablet 0     No current facility-administered medications for this visit.        Review of Symptoms:    Review of Systems   Constitutional: Positive for unexpected weight loss.   Psychiatric/Behavioral: Positive for decreased concentration and stress.         Physical Exam:   Due to the remote nature of this encounter (virtual encounter), vitals were unable to be obtained.  Height stated at 66 inches.  Weight stated at 156 pounds.         Physical Exam  Neurological:      Mental Status: She is alert.   Psychiatric:         Attention and Perception: Attention and perception normal.         Mood and Affect: Mood normal.         Speech: Speech normal.         Behavior: Behavior is cooperative.         Thought Content: Thought content normal. Thought content is not paranoid or delusional. Thought content does not include homicidal or suicidal ideation. Thought content does not include homicidal or suicidal plan.         Cognition and Memory: Cognition and memory normal.         Judgment: Judgment normal.      Comments: Stress. Unable to assess affect and behavior           Mental Status Exam:   Hygiene:   Unable to assess  Cooperation:  Cooperative  Eye Contact:  Unable to assess  Psychomotor Behavior:  Unable to assess  Affect:  Unable to assess  Mood: euthymic  Speech:  Normal  Thought Process:  Linear  Thought Content:  Normal  Suicidal:  None  Homicidal:  None  Hallucinations:  None  Delusion:  None  Memory:  Intact  Orientation:  Person, Place, Time and Situation  Reliability:  good  Insight:  Good  Judgement:  Good  Impulse Control:  Fair  Physical/Medical Issues:  No        [facility] request number [id number] reviewed by this APRN at today's encounter.    Previous Provider notes and available records reviewed by this APRN at today's encounter.         Lab Results:   Lab on 12/16/2022   Component Date Value Ref Range Status   • Rheumatoid Factor Quantitative 12/16/2022 <10.0  0.0 - 14.0 IU/mL Final   • Sed Rate 12/16/2022 8  0 - 20 mm/hr Final   • ADILSON SSA (RO) Ab 12/16/2022 <0.2  0.0 - 0.9  AI Final   • ADILSON SSB (LA) Ab 12/16/2022 <0.2  0.0 - 0.9 AI Final   • C-Reactive Protein 12/16/2022 <0.30  0.00 - 0.50 mg/dL Final   • C4 Complement 12/16/2022 23.0  14.0 - 44.0 mg/dl Final   • C3 Complement 12/16/2022 126.0  82.0 - 167.0 mg/dl Final   • Vitamin B-12 12/16/2022 717  211 - 946 pg/mL Final   • Folate 12/16/2022 9.23  4.78 - 24.20 ng/mL Final   •  12/16/2022 6.3  0.0 - 38.1 U/mL Final   • Iron 12/16/2022 145  37 - 145 mcg/dL Final   • Iron Saturation 12/16/2022 29  20 - 50 % Final   • Transferrin 12/16/2022 330  200 - 360 mg/dL Final   • TIBC 12/16/2022 492  298 - 536 mcg/dL Final         Assessment & Plan   Problems Addressed this Visit    None  Visit Diagnoses     Attention deficit hyperactivity disorder, combined type  (Chronic)   -  Primary    Relevant Medications    amphetamine-dextroamphetamine XR (Adderall XR) 25 MG 24 hr capsule    amphetamine-dextroamphetamine (Adderall) 10 MG tablet    Other Relevant Orders    Urine Drug Screen - Urine, Clean Catch    Situational anxiety          Diagnoses       Codes Comments    Attention deficit hyperactivity disorder, combined type    -  Primary ICD-10-CM: F90.2  ICD-9-CM: 314.01     Situational anxiety     ICD-10-CM: F41.8  ICD-9-CM: 300.09           Visit Diagnoses:    ICD-10-CM ICD-9-CM   1. Attention deficit hyperactivity disorder, combined type  F90.2 314.01   2. Situational anxiety  F41.8 300.09          GOALS:  Short Term Goals: Patient will be compliant with medication, and patient will have no significant medication related side effects.  Patient will be engaged in psychotherapy as indicated.  Patient will report subjective improvement of symptoms.  Long term goals: To stabilize mood and treat/improve subjective symptoms, the patient will stay out of the hospital, the patient will be at an optimal level of functioning, and the patient will take all medications as prescribed.  The patient verbalized understanding and agreement with goals that  were mutually set.      TREATMENT PLAN:   -Increase Adderall XR to 25 mg PO QAM for ADHD  -Continue Adderall 5-10 mg PO every afternoon PRN for ADHD  -Continue hydroxyzine 25-50 mg PO TID PRN for anxiety  -Obtain UDS    *Pt is a 12 week follow-up due to cost of appointments. Pt knows she is to call if she has any questions or concerns*    Medication and treatment options, both pharmacological and non-pharmacological treatment options, discussed during today's visit, including any off label use of medication. Patient acknowledged and verbally consented with current treatment plan and was educated on the importance of compliance with treatment and follow-up appointments.        MEDICATION ISSUES:    Discussed treatment plan and medication options of prescribed medication as well as the risks, benefits, any black box warnings, and side effects including potential falls, possible impaired driving, and metabolic adversities among others, including any off label use of medication. Patient is agreeable to call the office with any worsening of symptoms or onset of side effects, or if any concerns or questions arise.  The contact information for the office is made available to the patient. Patient is agreeable to call 911 or go to the nearest ER should they begin having any SI/HI, or if any urgent concerns arise. No medication side effects or related complaints today.       MEDS ORDERED DURING VISIT:  New Medications Ordered This Visit   Medications   • amphetamine-dextroamphetamine XR (Adderall XR) 25 MG 24 hr capsule     Sig: Take 1 capsule by mouth Every Morning     Dispense:  30 capsule     Refill:  0   • amphetamine-dextroamphetamine (Adderall) 10 MG tablet     Sig: Take 1/2-1 tablet PO every afternoon PRN     Dispense:  30 tablet     Refill:  0     Pt is also taking Adderall XR       Return in about 12 weeks (around 3/29/2023), or if symptoms worsen or fail to improve, for Recheck.     Treatment plan completed on  1/4/23    Progress toward goal: Not at goal    Functional Status: Mild impairment     Prognosis: Good with Ongoing Treatment         This document has been electronically signed by MICHEL Guerrero  January 4, 2023 08:42 EST     Some of the data in this electronic note has been brought forward from a previous encounter, any necessary changes have been made, it has been reviewed by this APRN, and it is accurate.    Please note that portions of this note were completed with a voice recognition program. Efforts were made to edit dictation, but occasionally words are mistranscribed.

## 2023-01-19 DIAGNOSIS — F90.2 ATTENTION DEFICIT HYPERACTIVITY DISORDER, COMBINED TYPE: Chronic | ICD-10-CM

## 2023-01-19 RX ORDER — DEXTROAMPHETAMINE SACCHARATE, AMPHETAMINE ASPARTATE, DEXTROAMPHETAMINE SULFATE AND AMPHETAMINE SULFATE 2.5; 2.5; 2.5; 2.5 MG/1; MG/1; MG/1; MG/1
TABLET ORAL
Qty: 30 TABLET | Refills: 0 | Status: SHIPPED | OUTPATIENT
Start: 2023-01-19 | End: 2023-02-14 | Stop reason: SDUPTHER

## 2023-01-19 NOTE — TELEPHONE ENCOUNTER
Patient called stating [facility] is out of Adderall and patient would like for medication to be sent to [facility].  Prescription has been cancelled at [facility].  Please advise.

## 2023-02-14 DIAGNOSIS — F90.2 ATTENTION DEFICIT HYPERACTIVITY DISORDER, COMBINED TYPE: Chronic | ICD-10-CM

## 2023-02-14 DIAGNOSIS — F41.8 SITUATIONAL ANXIETY: ICD-10-CM

## 2023-02-15 RX ORDER — HYDROXYZINE HYDROCHLORIDE 25 MG/1
TABLET, FILM COATED ORAL
Qty: 90 TABLET | Refills: 0 | Status: SHIPPED | OUTPATIENT
Start: 2023-02-15

## 2023-02-15 RX ORDER — DEXTROAMPHETAMINE SACCHARATE, AMPHETAMINE ASPARTATE MONOHYDRATE, DEXTROAMPHETAMINE SULFATE AND AMPHETAMINE SULFATE 6.25; 6.25; 6.25; 6.25 MG/1; MG/1; MG/1; MG/1
25 CAPSULE, EXTENDED RELEASE ORAL EVERY MORNING
Qty: 30 CAPSULE | Refills: 0 | Status: SHIPPED | OUTPATIENT
Start: 2023-02-15 | End: 2023-03-22 | Stop reason: SDUPTHER

## 2023-02-15 RX ORDER — DEXTROAMPHETAMINE SACCHARATE, AMPHETAMINE ASPARTATE, DEXTROAMPHETAMINE SULFATE AND AMPHETAMINE SULFATE 2.5; 2.5; 2.5; 2.5 MG/1; MG/1; MG/1; MG/1
TABLET ORAL
Qty: 30 TABLET | Refills: 0 | Status: SHIPPED | OUTPATIENT
Start: 2023-02-15 | End: 2023-03-22 | Stop reason: SDUPTHER

## 2023-03-22 DIAGNOSIS — F90.2 ATTENTION DEFICIT HYPERACTIVITY DISORDER, COMBINED TYPE: Chronic | ICD-10-CM

## 2023-03-22 RX ORDER — DEXTROAMPHETAMINE SACCHARATE, AMPHETAMINE ASPARTATE, DEXTROAMPHETAMINE SULFATE AND AMPHETAMINE SULFATE 2.5; 2.5; 2.5; 2.5 MG/1; MG/1; MG/1; MG/1
TABLET ORAL
Qty: 30 TABLET | Refills: 0 | Status: SHIPPED | OUTPATIENT
Start: 2023-03-22

## 2023-03-22 RX ORDER — DEXTROAMPHETAMINE SACCHARATE, AMPHETAMINE ASPARTATE MONOHYDRATE, DEXTROAMPHETAMINE SULFATE AND AMPHETAMINE SULFATE 6.25; 6.25; 6.25; 6.25 MG/1; MG/1; MG/1; MG/1
25 CAPSULE, EXTENDED RELEASE ORAL EVERY MORNING
Qty: 30 CAPSULE | Refills: 0 | Status: SHIPPED | OUTPATIENT
Start: 2023-03-22

## 2023-03-29 ENCOUNTER — TELEMEDICINE (OUTPATIENT)
Dept: PSYCHIATRY | Facility: CLINIC | Age: 38
End: 2023-03-29
Payer: COMMERCIAL

## 2023-03-29 DIAGNOSIS — F41.8 SITUATIONAL ANXIETY: ICD-10-CM

## 2023-03-29 DIAGNOSIS — F90.2 ATTENTION DEFICIT HYPERACTIVITY DISORDER, COMBINED TYPE: Primary | Chronic | ICD-10-CM

## 2023-03-29 PROCEDURE — 99213 OFFICE O/P EST LOW 20 MIN: CPT | Performed by: NURSE PRACTITIONER

## 2023-03-29 NOTE — PROGRESS NOTES
This provider is completing this appointment through [facility] (through [facility]), [address], [place], [address] using a secure [employer] Video Visit through [employer]. Patient is being seen remotely via telehealth in [place], and stated they are in a secure environment for this session. The patient's condition being diagnosed/treated is appropriate for telemedicine. The provider identified herself as well as her credentials.  The patient, and/or patients guardian, consent to be seen remotely, and when consent is given they understand that the consent allows for patient identifiable information to be sent to a third party as needed. They may refuse to be seen remotely at any time. The electronic data is encrypted and password protected, and the patient and/or guardian has been advised of the potential risks to privacy not withstanding such measures.    You have chosen to receive care through a telehealth visit.  Do you consent to use a video/audio connection for your medical care today? Yes    Patient identifiers utilized: Name and date of birth.        Subjective   Honey Thomas is a 37 y.o. female who presents today for follow up    Chief Complaint:  ADHD     Accompanied by: Pt was alone for duration of appointment    History of Present Illness:   Pt was last seen by this APRN on 1/4/23.   Pt states two months ago her  was placed on emergency leave without pay from [employer] due to following his supervisor's instructions. The instructions were not documented. However, all the other mail carriers were given the same instructions and will attest to it. This has been very stressful on the family because he faces termination. They recently built a house and they cannot afford the mortgage without his salary. Pt has been feeling anxious and disconnected. 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 Per pt, they have always been able to depend on her to  shifts for the past ten years. However, pt feels she needs a mental health break. Pt's supervisor at  demanded pt provide her with the days she is able to work. When pt wasn't able to  as many shifts during 's busiest time of year for employee vacation requests (spring break and summer), pt states her supervisor was very disrespectful toward her. Pt states when she explained she needs a mental health break, her supervisor was callous. Per pt, she was never hateful toward her, but the supervisor told pt she would only communicate with her in writing or if another person is able to be a witness in any future conversations. 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 Pt plans to leave  after the way her supervisor treated her. Pt finds it disheartening after being such a team player for ten years. Pt continues to use the hydroxyzine PRN. Pt doesn't wish for any medication change and states she will call if she needs anything. Pt has been having more trouble concentrating since she has had the added stress. Pt states the increase dose in Adderall XR last appointment was beneficial. She needs the PRN dose because her days are very long with how much she works.Pt is better able to manage her work and home life as a result; they no longer feel as if daily tasks are a struggle. They continue to need the medication for a better quality of life. Pt denies having experienced any chest pain/discomfort, shortness of breath, and/or other cardiac symptoms since their last appointment. Pt understands and has agreed to discontinue the prescribed stimulant if cardiac symptoms develop and seek medical treatment. The patient denies any new medical problems or changes in medications since last  appointment with this facility. The patient reports compliance with current medication regimen. The patient denies any current side effects from their current medication regimen. The patient denies any abnormal muscle movements or tics. The patient would like to not adjust or change their medications at this visit. The patient denies any suicidal or homicidal ideations, plans, or intent at today's encounter and is convincing. The patient denies any auditory hallucinations or visual hallucinations. The patient does not endorse any significant symptoms consistent with lulu or psychosis at today's encounter.     *If the patient has any concerns or needs assistance, they may call the [facility] at [phone]*          Prior Psychiatric Medications:  Buspar: pt struggled to remember to take multiple times a day  Zoloft: ineffective  Wellbutrin XL: ineffective  Paxil: ineffective  Strattera: nausea, irritability        The following portions of the patient's history were reviewed and updated as appropriate: allergies, current medications, past family history, past medical history, past social history, past surgical history and problem list.          Past Medical History:  Past Medical History:   Diagnosis Date   • Allergic    • Anxiety    • BPPV (benign paroxysmal positional vertigo)    • Carpal tunnel syndrome 2010    bilat   • TMJ (dislocation of temporomandibular joint) 2/4/2021   • Vitamin D deficiency        Social History:  Social History     Socioeconomic History   • Marital status:    Tobacco Use   • Smoking status: Former     Packs/day: 0.50     Years: 10.00     Pack years: 5.00     Types: Cigarettes   • Smokeless tobacco: Never   Vaping Use   • Vaping Use: Never used   Substance and Sexual Activity   • Alcohol use: Yes     Comment: 1 or less beers per week   • Drug use: No   • Sexual activity: Yes     Partners: Male     Birth control/protection: Surgical       Family  History:  Family History   Problem Relation Age of Onset   • Hypertension Father    • Diabetes Father    • Hypertension Mother    • Ovarian cancer Mother 37   • Graves' disease Sister    • Scoliosis Sister    • Sleep apnea Sister    • Lung cancer Maternal Grandmother    • Alzheimer's disease Paternal Grandmother    • Other Paternal Grandfather         accident   • Autism Niece        Past Surgical History:  Past Surgical History:   Procedure Laterality Date   • ABDOMINOPLASTY N/A 2019    Procedure: ABDOMINOPLASTY;  Surgeon: Alphonso Vasquez MD;  Location:  [facility];  Service: Plastics   • BREAST AUGMENTATION Bilateral 2019    Procedure: BREAST AUGMENTATION WITH IMPLANTS BILATERAL;  Surgeon: Alphonso Vasquez MD;  Location:  [facility];  Service: Plastics   • COSMETIC SURGERY  19    Abdominoplasty; breast augmentation   • FRACTURE SURGERY Left 1995    lEFT FOREARM   • TOTAL LAPAROSCOPIC HYSTERECTOMY SALPINGO OOPHORECTOMY N/A 2019    Procedure: TOTAL ROBOTIC HYSTERECTOMY WITH BILATERAL SALPINGO-OOPHORECTOMY;  Surgeon: Summer Dorsey DO;  Location:  [facility];  Service: DaVinci   • WISDOM TOOTH EXTRACTION         Problem List:  Patient Active Problem List   Diagnosis   •  (normal spontaneous vaginal delivery)   • Vitamin D deficiency   • Anxiety   • BPPV (benign paroxysmal positional vertigo)   • Carpal tunnel syndrome   • TMJ (dislocation of temporomandibular joint)   • Family history of ovarian cancer   • Mold exposure       Allergy:   No Known Allergies     Current Medications:   Current Outpatient Medications   Medication Sig Dispense Refill   • amphetamine-dextroamphetamine (Adderall) 10 MG tablet Take 1/2-1 tablet PO every afternoon PRN 30 tablet 0   • amphetamine-dextroamphetamine XR (Adderall XR) 25 MG 24 hr capsule Take 1 capsule by mouth Every Morning 30 capsule 0   • cyclobenzaprine (FLEXERIL) 10 MG tablet Take 1 tablet by mouth 3 (Three) Times a Day As Needed for Muscle  Spasms. 30 tablet 1   • estradiol (ESTRACE) 1 MG tablet TAKE 1 TABLET BY MOUTH EVERY DAY 14 tablet 0   • hydrOXYzine (ATARAX) 25 MG tablet TAKE 1-2 TABLETS BY MOUTH 3 TIMES DAILY AS NEEDED FOR ANXIETY/SLEEP 90 tablet 0   • naproxen (Naprosyn) 500 MG tablet Take 1 tablet by mouth 2 (Two) Times a Day With Meals. 60 tablet 2   • omeprazole (PrilOSEC) 20 MG capsule Take 1 capsule by mouth Daily. 90 capsule 3   • ondansetron ODT (Zofran ODT) 4 MG disintegrating tablet Place 1 tablet on the tongue Every 8 (Eight) Hours As Needed for Nausea or Vomiting. 20 tablet 0     No current facility-administered medications for this visit.       Review of Symptoms:    Review of Systems   Constitutional: Negative.    Psychiatric/Behavioral: Positive for decreased concentration and stress. The patient is nervous/anxious.          Physical Exam:   Due to the remote nature of this encounter (virtual encounter), vitals were unable to be obtained.  Height stated at 66 inches.  Weight stated at 156 pounds.         Physical Exam  Neurological:      Mental Status: She is alert.   Psychiatric:         Attention and Perception: Attention and perception normal.         Mood and Affect: Affect normal. Mood is anxious.         Speech: Speech normal.         Behavior: Behavior normal. Behavior is cooperative.         Thought Content: Thought content normal. Thought content is not paranoid or delusional. Thought content does not include homicidal or suicidal ideation. Thought content does not include homicidal or suicidal plan.         Cognition and Memory: Cognition and memory normal.         Judgment: Judgment normal.      Comments: Stress.            Mental Status Exam:   Hygiene:   good  Cooperation:  Cooperative  Eye Contact:  Good  Psychomotor Behavior:  Appropriate  Affect:  Full range  Mood: anxious and frustrated over recent situations  Speech:  Normal  Thought Process:  Linear  Thought Content:  Mood congruent  Suicidal:  None  Homicidal:   None  Hallucinations:  None  Delusion:  None  Memory:  Intact  Orientation:  Person, Place, Time and Situation  Reliability:  good  Insight:  Good  Judgement:  Good  Impulse Control:  Camille STILES request number [id number] reviewed by this APRN at today's encounter.    Previous Provider notes and available records reviewed by this APRN at today's encounter.         Lab Results:   No visits with results within 1 Month(s) from this visit.   Latest known visit with results is:   Lab on 12/16/2022   Component Date Value Ref Range Status   • Rheumatoid Factor Quantitative 12/16/2022 <10.0  0.0 - 14.0 IU/mL Final   • Sed Rate 12/16/2022 8  0 - 20 mm/hr Final   • ADILSON SSA (RO) Ab 12/16/2022 <0.2  0.0 - 0.9 AI Final   • ADILSON SSB (LA) Ab 12/16/2022 <0.2  0.0 - 0.9 AI Final   • C-Reactive Protein 12/16/2022 <0.30  0.00 - 0.50 mg/dL Final   • C4 Complement 12/16/2022 23.0  14.0 - 44.0 mg/dl Final   • C3 Complement 12/16/2022 126.0  82.0 - 167.0 mg/dl Final   • Vitamin B-12 12/16/2022 717  211 - 946 pg/mL Final   • Folate 12/16/2022 9.23  4.78 - 24.20 ng/mL Final   •  12/16/2022 6.3  0.0 - 38.1 U/mL Final   • Iron 12/16/2022 145  37 - 145 mcg/dL Final   • Iron Saturation 12/16/2022 29  20 - 50 % Final   • Transferrin 12/16/2022 330  200 - 360 mg/dL Final   • TIBC 12/16/2022 492  298 - 536 mcg/dL Final         Assessment & Plan   Problems Addressed this Visit    None  Visit Diagnoses     Attention deficit hyperactivity disorder, combined type  (Chronic)   -  Primary    Situational anxiety          Diagnoses       Codes Comments    Attention deficit hyperactivity disorder, combined type    -  Primary ICD-10-CM: F90.2  ICD-9-CM: 314.01     Situational anxiety     ICD-10-CM: F41.8  ICD-9-CM: 300.09           Visit Diagnoses:    ICD-10-CM ICD-9-CM   1. Attention deficit hyperactivity disorder, combined type  F90.2 314.01   2. Situational anxiety  F41.8 300.09          GOALS:  Short Term Goals: Patient will be compliant with  medication, and patient will have no significant medication related side effects.  Patient will be engaged in psychotherapy as indicated.  Patient will report subjective improvement of symptoms.  Long term goals: To stabilize mood and treat/improve subjective symptoms, the patient will stay out of the hospital, the patient will be at an optimal level of functioning, and the patient will take all medications as prescribed.  The patient verbalized understanding and agreement with goals that were mutually set.      TREATMENT PLAN:   -Continue Adderall XR 25 mg PO QAM for ADHD  -Continue Adderall 5-10 mg PO every afternoon PRN for ADHD  -Continue hydroxyzine 25-50 mg PO TID PRN for anxiety  -Obtain UDS  -Discussed with the patient that the Biden Administration announced on January 30, 2023 that the national and public health emergencies (PHE) will end on May 11, 2023.  Discussed with the patient that during the PHE a portion of the Robbi Hayley Act was waived, allowing controlled substances to be provided via telemedicine without in person encounters. Discussed with the patient that with the PHE ending the Robbi Hayley Act will go back into full effect, meaning that prescriptions for controlled substances can not be given via telemedicine without in person encounters and meeting all requirements of the Robbi Hayley Act.  Discussed with the patient that the [facility] is strictly a telemedicine clinic and cannot offer the patient an in-person evaluation to be compliant with the Robbi Hayley Act as it goes back into effect. Discussed with the patient unless there are legislative changes prior to the ending of the PHE on May 11, 2023, the patient will no longer be able to obtain prescription(s) for controlled substance from this [facility]/the [facility] and they will need to establish care with a local provider in their area for an in-person  evaluation and treatment with any controlled substances. The patient verbalizes understanding in their own words.    *Pt is a 12 week follow-up due to cost of appointments. Pt knows she is to call if she has any questions or concerns*    Medication and treatment options, both pharmacological and non-pharmacological treatment options, discussed during today's visit, including any off label use of medication. Patient acknowledged and verbally consented with current treatment plan and was educated on the importance of compliance with treatment and follow-up appointments.        MEDICATION ISSUES:    Discussed treatment plan and medication options of prescribed medication as well as the risks, benefits, any black box warnings, and side effects including potential falls, possible impaired driving, and metabolic adversities among others, including any off label use of medication. Patient is agreeable to call the office with any worsening of symptoms or onset of side effects, or if any concerns or questions arise.  The contact information for the office is made available to the patient. Patient is agreeable to call 911 or go to the nearest ER should they begin having any SI/HI, or if any urgent concerns arise.       MEDS ORDERED DURING VISIT:  No orders of the defined types were placed in this encounter.      Return in about 12 weeks (around 6/21/2023), or if symptoms worsen or fail to improve, for Recheck.     Treatment plan completed on 1/4/23    Progress toward goal: Not at goal    Functional Status: Moderate impairment     Prognosis: Good with Ongoing Treatment         This document has been electronically signed by MICHEL Guerrero  March 29, 2023 12:33 EDT     Some of the data in this electronic note has been brought forward from a previous encounter, any necessary changes have been made, it has been reviewed by this APRN, and it is accurate.    Please note that portions of this note were completed with a voice  recognition program. Efforts were made to edit dictation, but occasionally words are mistranscribed.

## 2023-04-10 ENCOUNTER — LAB (OUTPATIENT)
Dept: LAB | Facility: HOSPITAL | Age: 38
End: 2023-04-10
Payer: COMMERCIAL

## 2023-04-10 PROCEDURE — 80306 DRUG TEST PRSMV INSTRMNT: CPT | Performed by: NURSE PRACTITIONER

## 2023-04-26 DIAGNOSIS — F90.2 ATTENTION DEFICIT HYPERACTIVITY DISORDER, COMBINED TYPE: Chronic | ICD-10-CM

## 2023-04-26 DIAGNOSIS — G56.03 BILATERAL CARPAL TUNNEL SYNDROME: ICD-10-CM

## 2023-04-26 DIAGNOSIS — M54.2 NECK PAIN, MUSCULOSKELETAL: ICD-10-CM

## 2023-04-26 RX ORDER — NAPROXEN 500 MG/1
500 TABLET ORAL 2 TIMES DAILY WITH MEALS
Qty: 60 TABLET | Refills: 2 | Status: SHIPPED | OUTPATIENT
Start: 2023-04-26

## 2023-04-26 NOTE — TELEPHONE ENCOUNTER
Rx Refill Note  Requested Prescriptions     Pending Prescriptions Disp Refills   • naproxen (Naprosyn) 500 MG tablet 60 tablet 2     Sig: Take 1 tablet by mouth 2 (Two) Times a Day With Meals.      Last office visit with prescribing clinician: 12/16/2022   Last telemedicine visit with prescribing clinician: Visit date not found   Next office visit with prescribing clinician: Visit date not found                         Would you like a call back once the refill request has been completed: [] Yes [] No    If the office needs to give you a call back, can they leave a voicemail: [] Yes [] No    Sasha Joe MA  04/26/23, 16:36 EDT

## 2023-04-27 RX ORDER — DEXTROAMPHETAMINE SACCHARATE, AMPHETAMINE ASPARTATE, DEXTROAMPHETAMINE SULFATE AND AMPHETAMINE SULFATE 2.5; 2.5; 2.5; 2.5 MG/1; MG/1; MG/1; MG/1
TABLET ORAL
Qty: 30 TABLET | Refills: 0 | Status: SHIPPED | OUTPATIENT
Start: 2023-04-27

## 2023-04-27 RX ORDER — DEXTROAMPHETAMINE SACCHARATE, AMPHETAMINE ASPARTATE MONOHYDRATE, DEXTROAMPHETAMINE SULFATE AND AMPHETAMINE SULFATE 6.25; 6.25; 6.25; 6.25 MG/1; MG/1; MG/1; MG/1
25 CAPSULE, EXTENDED RELEASE ORAL EVERY MORNING
Qty: 30 CAPSULE | Refills: 0 | Status: SHIPPED | OUTPATIENT
Start: 2023-04-27

## 2023-07-21 ENCOUNTER — OFFICE VISIT (OUTPATIENT)
Dept: FAMILY MEDICINE CLINIC | Facility: CLINIC | Age: 38
End: 2023-07-21
Payer: COMMERCIAL

## 2023-07-21 ENCOUNTER — LAB (OUTPATIENT)
Dept: LAB | Facility: HOSPITAL | Age: 38
End: 2023-07-21
Payer: COMMERCIAL

## 2023-07-21 VITALS
BODY MASS INDEX: 26.71 KG/M2 | RESPIRATION RATE: 18 BRPM | SYSTOLIC BLOOD PRESSURE: 112 MMHG | WEIGHT: 166.2 LBS | HEIGHT: 66 IN | TEMPERATURE: 98 F | HEART RATE: 85 BPM | DIASTOLIC BLOOD PRESSURE: 84 MMHG

## 2023-07-21 DIAGNOSIS — F90.2 ATTENTION DEFICIT HYPERACTIVITY DISORDER, COMBINED TYPE: Chronic | ICD-10-CM
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
## 2023-07-21 DIAGNOSIS — G56.03 BILATERAL CARPAL TUNNEL SYNDROME: ICD-10-CM

## 2023-07-21 DIAGNOSIS — M54.2 NECK PAIN, MUSCULOSKELETAL: ICD-10-CM

## 2023-07-21 DIAGNOSIS — R74.8 ELEVATED LIVER ENZYMES: ICD-10-CM

## 2023-07-21 DIAGNOSIS — R74.8 ELEVATED LIVER ENZYMES: Primary | ICD-10-CM

## 2023-07-21 DIAGNOSIS — M54.2 NECK PAIN: ICD-10-CM

## 2023-07-21 DIAGNOSIS — S49.92XA INJURY OF LEFT SHOULDER, INITIAL ENCOUNTER: ICD-10-CM

## 2023-07-21 LAB
ALBUMIN SERPL-MCNC: 5 G/DL (ref 3.5–5.2)
ALBUMIN/GLOB SERPL: 2 G/DL
ALP SERPL-CCNC: 87 U/L (ref 39–117)
ALT SERPL W P-5'-P-CCNC: 19 U/L (ref 1–33)
ANION GAP SERPL CALCULATED.3IONS-SCNC: 13.8 MMOL/L (ref 5–15)
AST SERPL-CCNC: 25 U/L (ref 1–32)
BILIRUB SERPL-MCNC: 0.4 MG/DL (ref 0–1.2)
BUN SERPL-MCNC: 13 MG/DL (ref 6–20)
BUN/CREAT SERPL: 12.9 (ref 7–25)
CALCIUM SPEC-SCNC: 10 MG/DL (ref 8.6–10.5)
CHLORIDE SERPL-SCNC: 102 MMOL/L (ref 98–107)
CO2 SERPL-SCNC: 24.2 MMOL/L (ref 22–29)
CREAT SERPL-MCNC: 1.01 MG/DL (ref 0.57–1)
EGFRCR SERPLBLD CKD-EPI 2021: 72.8 ML/MIN/1.73
GLOBULIN UR ELPH-MCNC: 2.5 GM/DL
GLUCOSE SERPL-MCNC: 81 MG/DL (ref 65–99)
HAV IGM SERPL QL IA: NORMAL
HBV CORE IGM SERPL QL IA: NORMAL
HBV SURFACE AG SERPL QL IA: NORMAL
HCV AB SER DONR QL: NORMAL
POTASSIUM SERPL-SCNC: 4.1 MMOL/L (ref 3.5–5.2)
PROT SERPL-MCNC: 7.5 G/DL (ref 6–8.5)
SODIUM SERPL-SCNC: 140 MMOL/L (ref 136–145)

## 2023-07-21 PROCEDURE — 80053 COMPREHEN METABOLIC PANEL: CPT

## 2023-07-21 PROCEDURE — G0432 EIA HIV-1/HIV-2 SCREEN: HCPCS

## 2023-07-21 PROCEDURE — 99214 OFFICE O/P EST MOD 30 MIN: CPT | Performed by: NURSE PRACTITIONER

## 2023-07-21 PROCEDURE — 80074 ACUTE HEPATITIS PANEL: CPT

## 2023-07-21 RX ORDER — CYCLOBENZAPRINE HCL 10 MG
10 TABLET ORAL 2 TIMES DAILY PRN
Qty: 30 TABLET | Refills: 0 | Status: SHIPPED | OUTPATIENT
Start: 2023-07-21

## 2023-07-21 RX ORDER — NAPROXEN 500 MG/1
500 TABLET ORAL 2 TIMES DAILY WITH MEALS
Qty: 60 TABLET | Refills: 2 | Status: SHIPPED | OUTPATIENT
Start: 2023-07-21

## 2023-07-22 LAB — HIV1+2 AB SER QL: NORMAL

## 2023-07-24 RX ORDER — DEXTROAMPHETAMINE SACCHARATE, AMPHETAMINE ASPARTATE, DEXTROAMPHETAMINE SULFATE AND AMPHETAMINE SULFATE 2.5; 2.5; 2.5; 2.5 MG/1; MG/1; MG/1; MG/1
TABLET ORAL
Qty: 30 TABLET | Refills: 0 | Status: SHIPPED | OUTPATIENT
Start: 2023-07-24

## 2023-07-24 RX ORDER — DEXTROAMPHETAMINE SACCHARATE, AMPHETAMINE ASPARTATE MONOHYDRATE, DEXTROAMPHETAMINE SULFATE AND AMPHETAMINE SULFATE 6.25; 6.25; 6.25; 6.25 MG/1; MG/1; MG/1; MG/1
25 CAPSULE, EXTENDED RELEASE ORAL EVERY MORNING
Qty: 30 CAPSULE | Refills: 0 | Status: SHIPPED | OUTPATIENT
Start: 2023-07-24

## 2023-08-21 DIAGNOSIS — F90.2 ATTENTION DEFICIT HYPERACTIVITY DISORDER, COMBINED TYPE: Chronic | ICD-10-CM

## 2023-08-21 RX ORDER — DEXTROAMPHETAMINE SACCHARATE, AMPHETAMINE ASPARTATE, DEXTROAMPHETAMINE SULFATE AND AMPHETAMINE SULFATE 2.5; 2.5; 2.5; 2.5 MG/1; MG/1; MG/1; MG/1
TABLET ORAL
Qty: 30 TABLET | Refills: 0 | Status: SHIPPED | OUTPATIENT
Start: 2023-08-21

## 2023-08-21 RX ORDER — DEXTROAMPHETAMINE SACCHARATE, AMPHETAMINE ASPARTATE MONOHYDRATE, DEXTROAMPHETAMINE SULFATE AND AMPHETAMINE SULFATE 6.25; 6.25; 6.25; 6.25 MG/1; MG/1; MG/1; MG/1
25 CAPSULE, EXTENDED RELEASE ORAL EVERY MORNING
Qty: 30 CAPSULE | Refills: 0 | Status: SHIPPED | OUTPATIENT
Start: 2023-08-21

## 2023-09-13 ENCOUNTER — TELEPHONE (OUTPATIENT)
Dept: PSYCHIATRY | Facility: CLINIC | Age: 38
End: 2023-09-13

## 2023-09-13 NOTE — TELEPHONE ENCOUNTER
Left voicemail this morning for pt that 8am with LM has been cancelled. Also left a mychart message

## 2023-09-25 DIAGNOSIS — F90.2 ATTENTION DEFICIT HYPERACTIVITY DISORDER, COMBINED TYPE: Chronic | ICD-10-CM

## 2023-09-25 RX ORDER — DEXTROAMPHETAMINE SACCHARATE, AMPHETAMINE ASPARTATE MONOHYDRATE, DEXTROAMPHETAMINE SULFATE AND AMPHETAMINE SULFATE 6.25; 6.25; 6.25; 6.25 MG/1; MG/1; MG/1; MG/1
25 CAPSULE, EXTENDED RELEASE ORAL EVERY MORNING
Qty: 30 CAPSULE | Refills: 0 | Status: SHIPPED | OUTPATIENT
Start: 2023-09-25

## 2023-09-25 RX ORDER — DEXTROAMPHETAMINE SACCHARATE, AMPHETAMINE ASPARTATE, DEXTROAMPHETAMINE SULFATE AND AMPHETAMINE SULFATE 2.5; 2.5; 2.5; 2.5 MG/1; MG/1; MG/1; MG/1
TABLET ORAL
Qty: 30 TABLET | Refills: 0 | Status: SHIPPED | OUTPATIENT
Start: 2023-09-25

## 2023-10-18 ENCOUNTER — TELEMEDICINE (OUTPATIENT)
Dept: PSYCHIATRY | Facility: CLINIC | Age: 38
End: 2023-10-18
Payer: COMMERCIAL

## 2023-10-18 DIAGNOSIS — F90.2 ATTENTION DEFICIT HYPERACTIVITY DISORDER, COMBINED TYPE: Primary | Chronic | ICD-10-CM

## 2023-10-18 DIAGNOSIS — F41.8 SITUATIONAL ANXIETY: ICD-10-CM

## 2023-10-18 PROCEDURE — 99213 OFFICE O/P EST LOW 20 MIN: CPT | Performed by: NURSE PRACTITIONER

## 2023-10-18 RX ORDER — HYDROXYZINE HYDROCHLORIDE 25 MG/1
TABLET, FILM COATED ORAL
Qty: 90 TABLET | Refills: 0 | Status: SHIPPED | OUTPATIENT
Start: 2023-10-18

## 2023-10-18 RX ORDER — DEXTROAMPHETAMINE SACCHARATE, AMPHETAMINE ASPARTATE, DEXTROAMPHETAMINE SULFATE AND AMPHETAMINE SULFATE 2.5; 2.5; 2.5; 2.5 MG/1; MG/1; MG/1; MG/1
TABLET ORAL
Qty: 30 TABLET | Refills: 0 | Status: SHIPPED | OUTPATIENT
Start: 2023-10-18

## 2023-10-18 RX ORDER — DEXTROAMPHETAMINE SACCHARATE, AMPHETAMINE ASPARTATE MONOHYDRATE, DEXTROAMPHETAMINE SULFATE AND AMPHETAMINE SULFATE 6.25; 6.25; 6.25; 6.25 MG/1; MG/1; MG/1; MG/1
25 CAPSULE, EXTENDED RELEASE ORAL EVERY MORNING
Qty: 30 CAPSULE | Refills: 0 | Status: SHIPPED | OUTPATIENT
Start: 2023-10-18

## 2023-10-18 NOTE — PROGRESS NOTES
This provider is completing this appointment through [facility] (through [facility]), [address], [facility], [address] using a secure [employer] Video Visit through [employer]. Patient is being seen remotely via telehealth in [place], and stated they are in a secure environment for this session. The patient's condition being diagnosed/treated is appropriate for telemedicine. The provider identified herself as well as her credentials.   The patient, and/or patients guardian, consent to be seen remotely, and when consent is given they understand that the consent allows for patient identifiable information to be sent to a third party as needed.   They may refuse to be seen remotely at any time. The electronic data is encrypted and password protected, and the patient and/or guardian has been advised of the potential risks to privacy not withstanding such measures.    You have chosen to receive care through a telehealth visit.  Do you consent to use a video/audio connection for your medical care today? Yes    Patient identifiers utilized: Name and date of birth.        Subjective   Honey Thomas is a 38 y.o. female who presents today for follow up    Chief Complaint:  ADHD     Accompanied by: Pt was alone for duration of appointment    History of Present Illness:   Pt was last seen by this APRN on 6/21/23.  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 She no longer works on the weekends and is spending more time with her family. 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 Mood otherwise stable. ADHD symptoms are fairly well controlled on Adderall XR and Adderall IR at this time. Pt is better able to manage her work and home life as a result; she no longer feels as if daily tasks are a struggle. She continues to need the medication  for a better quality of life. Pt denies having experienced any chest pain/discomfort, shortness of breath, and/or other cardiac symptoms since their last appointment. Sleep is fairly stable. There are times when she stays up late working on paperwork. Appetite is stable. The patient denies any new medical problems since last appointment with this facility. The patient reports compliance with current medication regimen. The patient denies any current side effects from their current medication regimen. The patient denies any abnormal muscle movements or tics. The patient would like to not adjust or change their medications at this visit. The patient denies any suicidal or homicidal ideations, plans, or intent at today's encounter and is convincing. The patient denies any auditory hallucinations or visual hallucinations. The patient does not endorse any significant symptoms consistent with lulu or psychosis at today's encounter.     *If the patient has any concerns or needs assistance, they may call the [facility] at [phone]*        Prior Psychiatric Medications:  Buspar: pt struggled to remember to take multiple times a day  Zoloft: ineffective  Wellbutrin XL: ineffective  Paxil: ineffective  Strattera: nausea, irritability        The following portions of the patient's history were reviewed and updated as appropriate: allergies, current medications, past family history, past medical history, past social history, past surgical history and problem list.          Past Medical History:  Past Medical History:   Diagnosis Date    ADHD (attention deficit hyperactivity disorder) 2/2021    Allergic     Anxiety     BPPV (benign paroxysmal positional vertigo)     Carpal tunnel syndrome 2010    bilat    TMJ (dislocation of temporomandibular joint) 02/04/2021    Vitamin D deficiency        Social History:  Social History     Socioeconomic History    Marital status:    Tobacco Use     Smoking status: Former     Packs/day: 0.50     Years: 10.00     Additional pack years: 0.00     Total pack years: 5.00     Types: Cigarettes    Smokeless tobacco: Never   Vaping Use    Vaping Use: Never used   Substance and Sexual Activity    Alcohol use: Yes     Comment: 1 or less beers per week    Drug use: No    Sexual activity: Yes     Partners: Male     Birth control/protection: Surgical, Bilateral salpingectomy , Hysterectomy       Family History:  Family History   Problem Relation Age of Onset    Hypertension Father     Diabetes Father     Hypertension Mother     Ovarian cancer Mother 37    Graves' disease Sister     Scoliosis Sister     Sleep apnea Sister     Lung cancer Maternal Grandmother     Alzheimer's disease Paternal Grandmother     Other Paternal Grandfather         accident    Autism Niece        Past Surgical History:  Past Surgical History:   Procedure Laterality Date    ABDOMINOPLASTY N/A 2019    Procedure: ABDOMINOPLASTY;  Surgeon: Alphonso Vasquez MD;  Location:  [facility];  Service: Plastics    BREAST AUGMENTATION Bilateral 2019    Procedure: BREAST AUGMENTATION WITH IMPLANTS BILATERAL;  Surgeon: Alphonso Vasquez MD;  Location:  [facility];  Service: Plastics    BREAST SURGERY  19    Augmentation    COSMETIC SURGERY  19    Abdominoplasty; breast augmentation    FRACTURE SURGERY Left 1995    lEFT FOREARM    TOTAL LAPAROSCOPIC HYSTERECTOMY SALPINGO OOPHORECTOMY N/A 2019    Procedure: TOTAL ROBOTIC HYSTERECTOMY WITH BILATERAL SALPINGO-OOPHORECTOMY;  Surgeon: Summer Dorsey DO;  Location:  [facility];  Service: DaVinci    WISDOM TOOTH EXTRACTION         Problem List:  Patient Active Problem List   Diagnosis     (normal spontaneous vaginal delivery)    Vitamin D deficiency    Anxiety    BPPV (benign paroxysmal positional vertigo)    Carpal tunnel syndrome    TMJ (dislocation of temporomandibular joint)    Family history of ovarian cancer    Mold exposure     Elevated liver enzymes       Allergy:   No Known Allergies     Current Medications:   Current Outpatient Medications   Medication Sig Dispense Refill    amphetamine-dextroamphetamine (Adderall) 10 MG tablet Take 1/2-1 tablet PO every afternoon PRN 30 tablet 0    amphetamine-dextroamphetamine XR (Adderall XR) 25 MG 24 hr capsule Take 1 capsule by mouth Every Morning 30 capsule 0    hydrOXYzine (ATARAX) 25 MG tablet TAKE 1-2 TABLETS BY MOUTH 3 TIMES DAILY AS NEEDED FOR ANXIETY/SLEEP 90 tablet 0    cyclobenzaprine (FLEXERIL) 10 MG tablet Take 1 tablet by mouth 2 (Two) Times a Day As Needed for Muscle Spasms. 30 tablet 0    estradiol (ESTRACE) 1 MG tablet TAKE 1 TABLET BY MOUTH EVERY DAY 14 tablet 0    naproxen (Naprosyn) 500 MG tablet Take 1 tablet by mouth 2 (Two) Times a Day With Meals. 60 tablet 2    ondansetron ODT (Zofran ODT) 4 MG disintegrating tablet Place 1 tablet on the tongue Every 8 (Eight) Hours As Needed for Nausea or Vomiting. (Patient not taking: Reported on 7/21/2023) 20 tablet 0     No current facility-administered medications for this visit.       Review of Symptoms:    Review of Systems   Constitutional: Negative.    Psychiatric/Behavioral:  Positive for stress.          Physical Exam:   Due to the remote nature of this encounter (virtual encounter), vitals were unable to be obtained.  Height stated at 66 inches.  Weight stated at 166 pounds.         Physical Exam  Neurological:      Mental Status: She is alert.   Psychiatric:         Attention and Perception: Attention and perception normal.         Mood and Affect: Affect normal.         Speech: Speech normal.         Behavior: Behavior normal. Behavior is cooperative.         Thought Content: Thought content normal. Thought content is not paranoid or delusional. Thought content does not include homicidal or suicidal ideation. Thought content does not include homicidal or suicidal plan.         Cognition and Memory: Cognition and memory normal.          Judgment: Judgment normal.      Comments: Stress.          Mental Status Exam:   Hygiene:   good  Cooperation:  Cooperative  Eye Contact:  Good  Psychomotor Behavior:  Appropriate  Affect:  Full range  Mood:  Normal with stress  Speech:  Normal  Thought Process:  Goal directed  Thought Content:  Normal  Suicidal:  None  Homicidal:  None  Hallucinations:  None  Delusion:  None  Memory:  Intact  Orientation:  Person, Place, Time and Situation  Reliability:  good  Insight:  Good  Judgement:  Good  Impulse Control:  Fair      GO request number [id number] reviewed by this APRN at today's encounter.    Previous Provider notes and available records reviewed by this APRN at today's encounter.         Lab Results:   No visits with results within 1 Month(s) from this visit.   Latest known visit with results is:   Lab on 07/21/2023   Component Date Value Ref Range Status    Glucose 07/21/2023 81  65 - 99 mg/dL Final    BUN 07/21/2023 13  6 - 20 mg/dL Final    Creatinine 07/21/2023 1.01 (H)  0.57 - 1.00 mg/dL Final    Sodium 07/21/2023 140  136 - 145 mmol/L Final    Potassium 07/21/2023 4.1  3.5 - 5.2 mmol/L Final    Chloride 07/21/2023 102  98 - 107 mmol/L Final    CO2 07/21/2023 24.2  22.0 - 29.0 mmol/L Final    Calcium 07/21/2023 10.0  8.6 - 10.5 mg/dL Final    Total Protein 07/21/2023 7.5  6.0 - 8.5 g/dL Final    Albumin 07/21/2023 5.0  3.5 - 5.2 g/dL Final    ALT (SGPT) 07/21/2023 19  1 - 33 U/L Final    AST (SGOT) 07/21/2023 25  1 - 32 U/L Final    Alkaline Phosphatase 07/21/2023 87  39 - 117 U/L Final    Total Bilirubin 07/21/2023 0.4  0.0 - 1.2 mg/dL Final    Globulin 07/21/2023 2.5  gm/dL Final    A/G Ratio 07/21/2023 2.0  g/dL Final    BUN/Creatinine Ratio 07/21/2023 12.9  7.0 - 25.0 Final    Anion Gap 07/21/2023 13.8  5.0 - 15.0 mmol/L Final    eGFR 07/21/2023 72.8  >60.0 mL/min/1.73 Final    HIV-1/ HIV-2 07/21/2023 Non-Reactive  Non-Reactive Final    A non-reactive test result does not preclude the  possibility of exposure to HIV or infection with HIV. An antibody response to recent exposure may take several months to reach detectable levels.    Hepatitis B Surface Ag 07/21/2023 Non-Reactive  Non-Reactive Final    Hep A IgM 07/21/2023 Non-Reactive  Non-Reactive Final    Hep B C IgM 07/21/2023 Non-Reactive  Non-Reactive Final    Hepatitis C Ab 07/21/2023 Non-Reactive  Non-Reactive Final         Assessment & Plan   Problems Addressed this Visit    None  Visit Diagnoses       Attention deficit hyperactivity disorder, combined type  (Chronic)   -  Primary    Relevant Medications    amphetamine-dextroamphetamine (Adderall) 10 MG tablet    amphetamine-dextroamphetamine XR (Adderall XR) 25 MG 24 hr capsule    hydrOXYzine (ATARAX) 25 MG tablet    Situational anxiety        Relevant Medications    hydrOXYzine (ATARAX) 25 MG tablet          Diagnoses         Codes Comments    Attention deficit hyperactivity disorder, combined type    -  Primary ICD-10-CM: F90.2  ICD-9-CM: 314.01     Situational anxiety     ICD-10-CM: F41.8  ICD-9-CM: 300.09             Visit Diagnoses:    ICD-10-CM ICD-9-CM   1. Attention deficit hyperactivity disorder, combined type  F90.2 314.01   2. Situational anxiety  F41.8 300.09            GOALS:  Short Term Goals: Patient will be compliant with medication, and patient will have no significant medication related side effects.  Patient will be engaged in psychotherapy as indicated.  Patient will report subjective improvement of symptoms.  Long term goals: To stabilize mood and treat/improve subjective symptoms, the patient will stay out of the hospital, the patient will be at an optimal level of functioning, and the patient will take all medications as prescribed.  The patient verbalized understanding and agreement with goals that were mutually set.      TREATMENT PLAN:   -Continue Adderall XR 25 mg PO QAM for ADHD  -Continue Adderall 5-10 mg PO every afternoon PRN for ADHD  -Continue hydroxyzine  25-50 mg PO TID PRN for anxiety  -The patient has been made aware that the Biden Administration announced on January 30, 2023 that the COVID-19 Public Health Emergency (COVID-19 PHE) ended on May 11, 2023. The patient is aware that during the COVID-19 PHE a portion of the Robbi Hayley Act was waived, allowing controlled substances to be provided via telemedicine without in-person encounters. The patient is aware that with the COVID-19 PHE having ended the Robbi Hayley Act was to go back into full effect, meaning that prescriptions for controlled substances cannot be given via telemedicine without in-person encounters and meeting all requirements of the Robbi Hayley Act. Discussed with the patient that on May 10, 2023, the Drug Enforcement Agency (DAMIEN), jointly with the Substance Abuse and Mental Health Services Administration (SAMHSA), announced that they are issuing a temporary rule to extend certain exceptions granted to existing DAMIEN regulations in March 2020 as a result of the COVID-19 PHE, in order to avoid lapses in care for patients. The full set of telemedicine flexibilities regarding prescription of controlled medications as were in place during the COVID-19 PHE will remain in place through November 11, 2023. It further states that if a patient and a practitioner have established a telemedicine relationship on or before November 11, 2023, the same telemedicine flexibilities that have governed the relationship to that point are permitted until November 11, 2024. The patient understands that they will need to establish care with a local provider in their area for an in-person evaluation and treatment with any controlled substance prior to this date as the [facility] is strictly a telemedicine clinic and cannot offer the patient an in-person evaluation to be compliant with the Robbi Hayley Act when it goes back into full effect.     *Pt is a 12 week follow-up due to cost of  appointments. Pt knows she is to call if she has any questions or concerns*    Without the prescribed medication for ADHD, it is reported that the patient has problems with attention and focus including being easily distracted, easily losing objects, trouble with time management, trouble completing tasks because of distractions, procrastination, indecisiveness, careless mistakes in daily activities/work and not finishing jobs that are started. Due to the reported problems without the medication usage, as well as the significant improvement in symptoms with the medication usage, the patient requests to remain on the prescribed medication for ADHD. Patients has been educated on the risks involved with stimulants, such as cardiac complications, weight loss, the potential for dependence and tolerance, mood and behavior changes, and decrease in seizure threshold. Patient is also informed that the medication is to be used by the patient only, the medication is to be used only as directed, and the medication should not be combined with other substances unless directed by a Provider/Prescriber. The patient verbalized understanding and agreement with this in their own words, and wish to continue with current treatment plan.    Medication and treatment options, both pharmacological and non-pharmacological treatment options, discussed during today's visit, including any off label use of medication. Patient acknowledged and verbally consented with current treatment plan and was educated on the importance of compliance with treatment and follow-up appointments.        MEDICATION ISSUES:    Discussed treatment plan and medication options of prescribed medication as well as the risks, benefits, any black box warnings, and side effects including potential falls, possible impaired driving, and metabolic adversities among others, including any off label use of medication. Patient is agreeable to call the office with any worsening of  symptoms or onset of side effects, or if any concerns or questions arise.  The contact information for the office is made available to the patient. Patient is agreeable to call 911 or go to the nearest ER should they begin having any SI/HI, or if any urgent concerns arise.       MEDS ORDERED DURING VISIT:  New Medications Ordered This Visit   Medications    amphetamine-dextroamphetamine (Adderall) 10 MG tablet     Sig: Take 1/2-1 tablet PO every afternoon PRN     Dispense:  30 tablet     Refill:  0     Pt is also taking Adderall XR    amphetamine-dextroamphetamine XR (Adderall XR) 25 MG 24 hr capsule     Sig: Take 1 capsule by mouth Every Morning     Dispense:  30 capsule     Refill:  0    hydrOXYzine (ATARAX) 25 MG tablet     Sig: TAKE 1-2 TABLETS BY MOUTH 3 TIMES DAILY AS NEEDED FOR ANXIETY/SLEEP     Dispense:  90 tablet     Refill:  0       Return in about 12 weeks (around 1/10/2024), or if symptoms worsen or fail to improve, for Recheck.     Treatment plan completed on 1/4/23    Progress toward goal: Not at goal    Functional Status: Mild impairment     Prognosis: Good with Ongoing Treatment         This document has been electronically signed by MICHEL Guerrero  October 18, 2023 08:57 EDT     Some of the data in this electronic note has been brought forward from a previous encounter, any necessary changes have been made, it has been reviewed by this APRN, and it is accurate.    Please note that portions of this note were completed with a voice recognition program. Efforts were made to edit dictation, but occasionally words are mistranscribed.

## 2023-10-30 ENCOUNTER — OFFICE VISIT (OUTPATIENT)
Dept: FAMILY MEDICINE CLINIC | Facility: CLINIC | Age: 38
End: 2023-10-30
Payer: COMMERCIAL

## 2023-10-30 VITALS
OXYGEN SATURATION: 100 % | SYSTOLIC BLOOD PRESSURE: 114 MMHG | WEIGHT: 171.8 LBS | HEART RATE: 81 BPM | BODY MASS INDEX: 27.61 KG/M2 | DIASTOLIC BLOOD PRESSURE: 68 MMHG | TEMPERATURE: 97.5 F | RESPIRATION RATE: 13 BRPM | HEIGHT: 66 IN

## 2023-10-30 DIAGNOSIS — S99.922A INJURY OF LEFT TOE, INITIAL ENCOUNTER: Primary | ICD-10-CM

## 2023-10-30 PROCEDURE — 99213 OFFICE O/P EST LOW 20 MIN: CPT | Performed by: NURSE PRACTITIONER

## 2023-10-30 RX ORDER — NAPROXEN 500 MG/1
500 TABLET ORAL 2 TIMES DAILY WITH MEALS
Qty: 60 TABLET | Refills: 2 | Status: SHIPPED | OUTPATIENT
Start: 2023-10-30

## 2023-10-30 NOTE — PROGRESS NOTES
"Chief Complaint  Toe Injury (Pt had a piece of plywood drop on the second and third toe on left foot Saturday. )    Subjective          Honey Thomas presents to [facility]  History of Present Illness  Patient is a 38-year-old female.  She is here for complaint of a left foot toe injury.  Reporting a piece of plywood dropped on her second and third toes on Saturday.  She does have bleeding around the nailbeds.There is no sign of infection.  Toes are warm, movable, swollen, slight bruising..  She states they are not painful at this time.   \"The wood hit on the nails\".   She has been using wound , neosporin and has applied individual bandages.     Will check left foot xr. Referral to podiatry.   She will get a walking shoe. She is currently wearing slides.     No hx of diabetes.   Tdap 2/2017 up to date.     Toe Injury  This is a new problem. The current episode started in the past 7 days. The problem occurs constantly. The symptoms are aggravated by walking. She has tried ice for the symptoms. The treatment provided moderate relief.       The following portions of the patient's history were reviewed and updated as appropriate: allergies, current medications, past family history, past medical history, past social history, past surgical history and problem list.    Review of Systems   Constitutional:  Positive for activity change.   HENT: Negative.     Respiratory: Negative.     Cardiovascular: Negative.    Gastrointestinal: Negative.    Skin:  Positive for color change and wound.   Hematological: Negative.    Psychiatric/Behavioral: Negative.           Objective   Vital Signs:   /68   Pulse 81   Temp 97.5 °F (36.4 °C) (Temporal)   Resp 13   Ht 167.6 cm (65.98\")   Wt 77.9 kg (171 lb 12.8 oz)   SpO2 100%   BMI 27.74 kg/m²           PHQ-2/9 Depression Screening  PHQ-9 Total Score:      DA-7 Anxiety Screening  DA-7             Physical Exam  Vitals reviewed. "   Cardiovascular:      Rate and Rhythm: Normal rate.      Pulses: Normal pulses.   Musculoskeletal:         General: Signs of injury present.        Feet:    Feet:      Comments: Second and third toe of left - damage to skin and nail   Swollen, has been bleeding around proximal nails on both toes. light bruising.   Pink, moveable.   She is currently denying any pain.       Skin:     General: Skin is warm.      Capillary Refill: Capillary refill takes less than 2 seconds.   Neurological:      Mental Status: She is alert and oriented to person, place, and time.      Result Review :                 Assessment and Plan    Diagnoses and all orders for this visit:    1. Injury of left toe, initial encounter (Primary)  -     XR Foot 3+ View Left; Future  -     Ambulatory Referral to Podiatry  -     mupirocin (BACTROBAN) 2 % ointment; Apply 1 application  topically to the appropriate area as directed 3 (Three) Times a Day for 10 days.  Dispense: 30 g; Refill: 0  -     naproxen (Naprosyn) 500 MG tablet; Take 1 tablet by mouth 2 (Two) Times a Day With Meals.  Dispense: 60 tablet; Refill: 2    Checking XR  Referral podiatry- needs to be seen soon  Mupirocin tid   Naproxen prn     Walking shoe   Rest, ice, elevate. Tape individually.     Follow up if no improvement or worsening of condition   Has appointment with [facility] 10/31/2023.      Follow Up   Return if symptoms worsen or fail to improve.  Patient was given instructions and counseling regarding her condition or for health maintenance advice. Please see specific information pulled into the AVS if appropriate.

## 2023-10-31 ENCOUNTER — TELEPHONE (OUTPATIENT)
Dept: FAMILY MEDICINE CLINIC | Facility: CLINIC | Age: 38
End: 2023-10-31
Payer: COMMERCIAL

## 2023-10-31 NOTE — TELEPHONE ENCOUNTER
Okay for the HUB to read:     Let patient know her left foot /toes shows no fracture or dislocation.   Keep follow up with podiatrist.

## 2023-11-28 DIAGNOSIS — F90.2 ATTENTION DEFICIT HYPERACTIVITY DISORDER, COMBINED TYPE: Chronic | ICD-10-CM

## 2023-11-28 RX ORDER — DEXTROAMPHETAMINE SACCHARATE, AMPHETAMINE ASPARTATE MONOHYDRATE, DEXTROAMPHETAMINE SULFATE AND AMPHETAMINE SULFATE 6.25; 6.25; 6.25; 6.25 MG/1; MG/1; MG/1; MG/1
25 CAPSULE, EXTENDED RELEASE ORAL EVERY MORNING
Qty: 30 CAPSULE | Refills: 0 | Status: SHIPPED | OUTPATIENT
Start: 2023-11-28

## 2023-11-28 RX ORDER — DEXTROAMPHETAMINE SACCHARATE, AMPHETAMINE ASPARTATE, DEXTROAMPHETAMINE SULFATE AND AMPHETAMINE SULFATE 2.5; 2.5; 2.5; 2.5 MG/1; MG/1; MG/1; MG/1
TABLET ORAL
Qty: 30 TABLET | Refills: 0 | Status: SHIPPED | OUTPATIENT
Start: 2023-11-28

## 2024-01-04 DIAGNOSIS — F90.2 ATTENTION DEFICIT HYPERACTIVITY DISORDER, COMBINED TYPE: Chronic | ICD-10-CM

## 2024-01-04 RX ORDER — DEXTROAMPHETAMINE SACCHARATE, AMPHETAMINE ASPARTATE MONOHYDRATE, DEXTROAMPHETAMINE SULFATE AND AMPHETAMINE SULFATE 6.25; 6.25; 6.25; 6.25 MG/1; MG/1; MG/1; MG/1
25 CAPSULE, EXTENDED RELEASE ORAL EVERY MORNING
Qty: 30 CAPSULE | Refills: 0 | Status: SHIPPED | OUTPATIENT
Start: 2024-01-04

## 2024-01-04 RX ORDER — DEXTROAMPHETAMINE SACCHARATE, AMPHETAMINE ASPARTATE, DEXTROAMPHETAMINE SULFATE AND AMPHETAMINE SULFATE 2.5; 2.5; 2.5; 2.5 MG/1; MG/1; MG/1; MG/1
TABLET ORAL
Qty: 30 TABLET | Refills: 0 | Status: SHIPPED | OUTPATIENT
Start: 2024-01-04

## 2024-01-10 ENCOUNTER — TELEMEDICINE (OUTPATIENT)
Dept: PSYCHIATRY | Facility: CLINIC | Age: 39
End: 2024-01-10
Payer: COMMERCIAL

## 2024-01-10 DIAGNOSIS — F41.8 SITUATIONAL ANXIETY: ICD-10-CM

## 2024-01-10 DIAGNOSIS — F90.2 ATTENTION DEFICIT HYPERACTIVITY DISORDER, COMBINED TYPE: Primary | Chronic | ICD-10-CM

## 2024-01-10 PROCEDURE — 99213 OFFICE O/P EST LOW 20 MIN: CPT | Performed by: NURSE PRACTITIONER

## 2024-01-10 NOTE — TREATMENT PLAN
Multi-Disciplinary Problems (from Behavioral Health Treatment Plan)      Active Problems       Problem: ADHD (Adult)  Start Date: 01/10/24      Problem Details: The patient self-scales this problem as a 6 with 10 being the worst.        Goal Priority Start Date Expected End Date End Date    Patient will sustain attention and concentration to complete chores, and work responsibilites and increase positive interaction in all relationships. -- 01/10/24 -- --    Goal Details: Progress toward goal:  The patient self-scales their progress related to this goal as a 6 with 10 being the worst.        Goal Intervention Frequency Start Date End Date    Assist patient in setting responsible goals and breaking down large tasks. Q Month 01/10/24 --    Intervention Details: Duration of treatment until until discharged.        Goal Intervention Frequency Start Date End Date    Assist patient in using self monitoring checklist to improve attention, work performance, and social skills. Q Month 01/10/24 --    Intervention Details: Duration of treatment until until discharged.                               I have discussed and reviewed this treatment plan with the patient and/or guardian.  The patient has verbally agreed with this treatment plan (no signatures are obtained at today's visit as the patient is a telehealth patient and is unable to print and sign this document, therefore verbal agreement is obtained).

## 2024-01-10 NOTE — PROGRESS NOTES
"This provider is completing this appointment through [facility] (through [facility]), [address], [place], [address] using a secure [employer] Video Visit through [employer]. Patient is being seen remotely via telehealth in [place], and stated they are in a secure environment for this session. The patient's condition being diagnosed/treated is appropriate for telemedicine. The provider identified herself as well as her credentials.   The patient, and/or patients guardian, consent to be seen remotely, and when consent is given they understand that the consent allows for patient identifiable information to be sent to a third party as needed.   They may refuse to be seen remotely at any time. The electronic data is encrypted and password protected, and the patient and/or guardian has been advised of the potential risks to privacy not withstanding such measures.    You have chosen to receive care through a telehealth visit.  Do you consent to use a video/audio connection for your medical care today? Yes    Patient identifiers utilized: Name and date of birth.          Subjective   Honey Thomas is a 38 y.o. female who presents today for follow up    Chief Complaint:  ADHD     Accompanied by: Pt was alone for duration of appointment    History of Present Illness:   Pt was last seen by this APRN on 10/18/23.  Pt reports she has been doing well since her last appointment. Pt has long days working both of her jobs. She states it is \"a lot\" to keep up with, but enjoys it. Pt feels she needs to stay busy, she doesn't do well with idle time. Pt's ADHD symptoms are fairly controlled on the Adderall XR and Adderall IR. However, pt is interested in trying Vyvanse because at times the Adderall XR doesn't feel it is as effective at times. She would like to see if she has a better response to a different medication. Pt recently picked up her Adderall XR prescription and will wait until " next month before the change is made. Pt denies having experienced any chest pain/discomfort, shortness of breath, and/or other cardiac symptoms since their last appointment. Mood has been fairly stable. Pt reports the holidays aren't a good time to assess her mood. She is having some issues with her mother-in-law. Her dog is also having surgery today. Appetite is stable. Pt's sleep can be disrupted at times due to the amount of work she has to complete between both jobs. 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 The patient denies any new medical problems since last appointment with this facility. The patient reports compliance with current medication regimen. The patient denies any current side effects from their current medication regimen. The patient denies any abnormal muscle movements or tics. The patient rates their ADHD symptoms (on medication) at a 6/10 on a 0-10 scale, with 10 being the worst. The patient denies any suicidal or homicidal ideations, plans, or intent at today's encounter and is convincing. The patient denies any auditory hallucinations or visual hallucinations. The patient does not endorse any significant symptoms consistent with lulu or psychosis at today's encounter.     *If the patient has any concerns or needs assistance, they may call the [facility] at [phone]*        Prior Psychiatric Medications:  Buspar: pt struggled to remember to take multiple times a day  Zoloft: ineffective  Wellbutrin XL: ineffective  Paxil: ineffective  Strattera: nausea, irritability        The following portions of the patient's history were reviewed and updated as appropriate: allergies, current medications, past family history, past medical history, past social history, past surgical history and problem list.          Past Medical History:  Past Medical History:   Diagnosis Date    ADHD (attention deficit hyperactivity disorder) 2/2021    Allergic     Anxiety      BPPV (benign paroxysmal positional vertigo)     Carpal tunnel syndrome 2010    bilat    TMJ (dislocation of temporomandibular joint) 02/04/2021    Vitamin D deficiency        Social History:  Social History     Socioeconomic History    Marital status:    Tobacco Use    Smoking status: Former     Packs/day: 0.50     Years: 10.00     Additional pack years: 0.00     Total pack years: 5.00     Types: Cigarettes    Smokeless tobacco: Never   Vaping Use    Vaping Use: Never used   Substance and Sexual Activity    Alcohol use: Yes     Comment: 1 or less beers per week    Drug use: No    Sexual activity: Yes     Partners: Male     Birth control/protection: Surgical, Bilateral salpingectomy , Hysterectomy       Family History:  Family History   Problem Relation Age of Onset    Hypertension Father     Diabetes Father     Hypertension Mother     Ovarian cancer Mother 37    Graves' disease Sister     Scoliosis Sister     Sleep apnea Sister     Lung cancer Maternal Grandmother     Alzheimer's disease Paternal Grandmother     Other Paternal Grandfather         accident    Autism Niece        Past Surgical History:  Past Surgical History:   Procedure Laterality Date    ABDOMINOPLASTY N/A 11/25/2019    Procedure: ABDOMINOPLASTY;  Surgeon: Alphonso Vasquez MD;  Location:  [facility];  Service: Plastics    BREAST AUGMENTATION Bilateral 11/25/2019    Procedure: BREAST AUGMENTATION WITH IMPLANTS BILATERAL;  Surgeon: Alphonso Vasquez MD;  Location:  [facility];  Service: Plastics    BREAST SURGERY  11/25/19    Augmentation    COSMETIC SURGERY  11/25/19    Abdominoplasty; breast augmentation    FRACTURE SURGERY Left 1995    lEFT FOREARM    TOTAL LAPAROSCOPIC HYSTERECTOMY SALPINGO OOPHORECTOMY N/A 11/25/2019    Procedure: TOTAL ROBOTIC HYSTERECTOMY WITH BILATERAL SALPINGO-OOPHORECTOMY;  Surgeon: Summer Dorsey DO;  Location:  [facility];  Service: [place]    WISDOM TOOTH EXTRACTION         Problem List:  Patient Active  Problem List   Diagnosis     (normal spontaneous vaginal delivery)    Vitamin D deficiency    Anxiety    BPPV (benign paroxysmal positional vertigo)    Carpal tunnel syndrome    TMJ (dislocation of temporomandibular joint)    Family history of ovarian cancer    Mold exposure    Elevated liver enzymes       Allergy:   No Known Allergies     Current Medications:   Current Outpatient Medications   Medication Sig Dispense Refill    amphetamine-dextroamphetamine (Adderall) 10 MG tablet Take 1/2-1 tablet PO every afternoon PRN 30 tablet 0    amphetamine-dextroamphetamine XR (Adderall XR) 25 MG 24 hr capsule Take 1 capsule by mouth Every Morning 30 capsule 0    cyclobenzaprine (FLEXERIL) 10 MG tablet Take 1 tablet by mouth 2 (Two) Times a Day As Needed for Muscle Spasms. 30 tablet 0    estradiol (ESTRACE) 1 MG tablet TAKE 1 TABLET BY MOUTH EVERY DAY 14 tablet 0    hydrOXYzine (ATARAX) 25 MG tablet TAKE 1-2 TABLETS BY MOUTH 3 TIMES DAILY AS NEEDED FOR ANXIETY/SLEEP 90 tablet 0    naproxen (Naprosyn) 500 MG tablet Take 1 tablet by mouth 2 (Two) Times a Day With Meals. 60 tablet 2    ondansetron ODT (Zofran ODT) 4 MG disintegrating tablet Place 1 tablet on the tongue Every 8 (Eight) Hours As Needed for Nausea or Vomiting. 20 tablet 0     No current facility-administered medications for this visit.       Review of Symptoms:    Review of Systems   Constitutional: Negative.    Psychiatric/Behavioral:  Positive for decreased concentration and stress.          Physical Exam:   Due to the remote nature of this encounter (virtual encounter), vitals were unable to be obtained.  Height stated at 66 inches.  Weight stated at 170 pounds.         Physical Exam  Neurological:      Mental Status: She is alert.   Psychiatric:         Attention and Perception: Attention and perception normal. She does not perceive auditory or visual hallucinations.         Mood and Affect: Mood and affect normal.         Speech: Speech normal.          Behavior: Behavior normal. Behavior is cooperative.         Thought Content: Thought content normal. Thought content is not paranoid or delusional. Thought content does not include homicidal or suicidal ideation. Thought content does not include homicidal or suicidal plan.         Cognition and Memory: Cognition and memory normal.         Judgment: Judgment normal. Judgment is impulsive.      Comments: Stress         Mental Status Exam:   Hygiene:   good  Cooperation:  Cooperative  Eye Contact:  Good  Psychomotor Behavior:  Appropriate  Affect:  Full range  Mood:  Normal with stress  Speech:  Normal  Thought Process:  Linear  Thought Content:  Normal  Suicidal:  None  Homicidal:  None  Hallucinations:  None  Delusion:  None  Memory:  Intact  Orientation:  Person, Place, Time and Situation  Reliability:  good  Insight:  Good  Judgement:  Good  Impulse Control:  Fair      PHQ-9 Depression Screening  Little interest or pleasure in doing things? (P) 0-->not at all   Feeling down, depressed, or hopeless? (P) 0-->not at all   Trouble falling or staying asleep, or sleeping too much? (P) 1-->several days   Feeling tired or having little energy? (P) 0-->not at all   Poor appetite or overeating? (P) 0-->not at all   Feeling bad about yourself - or that you are a failure or have let yourself or your family down? (P) 0-->not at all   Trouble concentrating on things, such as reading the newspaper or watching television? (P) 2-->more than half the days   Moving or speaking so slowly that other people could have noticed? Or the opposite - being so fidgety or restless that you have been moving around a lot more than usual? (P) 2-->more than half the days   Thoughts that you would be better off dead, or of hurting yourself in some way? (P) 0-->not at all   PHQ-9 Total Score (P) 5   If you checked off any problems, how difficult have these problems made it for you to do your work, take care of things at home, or get along with other  people? (P) somewhat difficult     PHQ-9 Total Score: (P) 5      DA-7  Feeling nervous, anxious or on edge: (P) More than half the days  Not being able to stop or control worrying: (P) Several days  Worrying too much about different things: (P) Several days  Trouble Relaxing: (P) More than half the days  Being so restless that it is hard to sit still: (P) More than half the days  Feeling afraid as if something awful might happen: (P) Not at all  Becoming easily annoyed or irritable: (P) Several days  DA 7 Total Score: (P) 9  If you checked any problems, how difficult have these problems made it for you to do your work, take care of things at home, or get along with other people: (P) Somewhat difficult    GO request number [id number] reviewed by this APRN at today's encounter.    Previous Provider notes and available records reviewed by this APRN at today's encounter.         Lab Results:   No visits with results within 1 Month(s) from this visit.   Latest known visit with results is:   Lab on 07/21/2023   Component Date Value Ref Range Status    Glucose 07/21/2023 81  65 - 99 mg/dL Final    BUN 07/21/2023 13  6 - 20 mg/dL Final    Creatinine 07/21/2023 1.01 (H)  0.57 - 1.00 mg/dL Final    Sodium 07/21/2023 140  136 - 145 mmol/L Final    Potassium 07/21/2023 4.1  3.5 - 5.2 mmol/L Final    Chloride 07/21/2023 102  98 - 107 mmol/L Final    CO2 07/21/2023 24.2  22.0 - 29.0 mmol/L Final    Calcium 07/21/2023 10.0  8.6 - 10.5 mg/dL Final    Total Protein 07/21/2023 7.5  6.0 - 8.5 g/dL Final    Albumin 07/21/2023 5.0  3.5 - 5.2 g/dL Final    ALT (SGPT) 07/21/2023 19  1 - 33 U/L Final    AST (SGOT) 07/21/2023 25  1 - 32 U/L Final    Alkaline Phosphatase 07/21/2023 87  39 - 117 U/L Final    Total Bilirubin 07/21/2023 0.4  0.0 - 1.2 mg/dL Final    Globulin 07/21/2023 2.5  gm/dL Final    A/G Ratio 07/21/2023 2.0  g/dL Final    BUN/Creatinine Ratio 07/21/2023 12.9  7.0 - 25.0 Final    Anion Gap 07/21/2023 13.8  5.0 - 15.0  mmol/L Final    eGFR 07/21/2023 72.8  >60.0 mL/min/1.73 Final    HIV-1/ HIV-2 07/21/2023 Non-Reactive  Non-Reactive Final    A non-reactive test result does not preclude the possibility of exposure to HIV or infection with HIV. An antibody response to recent exposure may take several months to reach detectable levels.    Hepatitis B Surface Ag 07/21/2023 Non-Reactive  Non-Reactive Final    Hep A IgM 07/21/2023 Non-Reactive  Non-Reactive Final    Hep B C IgM 07/21/2023 Non-Reactive  Non-Reactive Final    Hepatitis C Ab 07/21/2023 Non-Reactive  Non-Reactive Final         Assessment & Plan   Problems Addressed this Visit    None  Visit Diagnoses       Attention deficit hyperactivity disorder, combined type  (Chronic)   -  Primary    Situational anxiety              Diagnoses         Codes Comments    Attention deficit hyperactivity disorder, combined type    -  Primary ICD-10-CM: F90.2  ICD-9-CM: 314.01     Situational anxiety     ICD-10-CM: F41.8  ICD-9-CM: 300.09             Visit Diagnoses:    ICD-10-CM ICD-9-CM   1. Attention deficit hyperactivity disorder, combined type  F90.2 314.01   2. Situational anxiety  F41.8 300.09           GOALS:  Short Term Goals: Patient will be compliant with medication, and patient will have no significant medication related side effects.  Patient will be engaged in psychotherapy as indicated.  Patient will report subjective improvement of symptoms.  Long term goals: To stabilize mood and treat/improve subjective symptoms, the patient will stay out of the hospital, the patient will be at an optimal level of functioning, and the patient will take all medications as prescribed.  The patient verbalized understanding and agreement with goals that were mutually set.      TREATMENT PLAN:   -Continue Adderall XR 25 mg PO QAM for ADHD  -After this prescription of Adderall XR is complete, will discontinue the Adderall XR and start Vyvanse 50 mg PO QAM for ADHD  -Continue Adderall 5-10 mg PO  every afternoon PRN for ADHD  -Continue hydroxyzine 25-50 mg PO TID PRN for anxiety  -The patient has been made aware that the Biden Administration announced on January 30, 2023 that the COVID-19 Public Health Emergency (COVID-19 PHE) ended on May 11, 2023. The patient is aware that during the COVID-19 PHE a portion of the Robbi Hayley Act was waived, allowing controlled substances to be provided via telemedicine without in-person encounters. The patient is aware that with the COVID-19 PHE having ended the Robbi Hayley Act was to go back into full effect, meaning that prescriptions for controlled substances cannot be given via telemedicine without in-person encounters and meeting all requirements of the Robbi Hayley Act. Discussed with the patient that on May 10, 2023, the Drug Enforcement Agency (DAMIEN), jointly with the Substance Abuse and Mental Health Services Administration (SAMHSA), announced that they are issuing a temporary rule to extend certain exceptions granted to existing DAMIEN regulations in March 2020 as a result of the COVID-19 PHE, in order to avoid lapses in care for patients. The full set of telemedicine flexibilities regarding prescription of controlled medications as were in place during the COVID-19 PHE will remain in place through December 31, 2024. The patient understands that they will need to establish care with a local provider in their area for an in-person evaluation and treatment with any controlled substance prior to this date as the [facility] is strictly a telemedicine clinic and cannot offer the patient an in-person evaluation to be compliant with the Robbi Hayley Act when it goes back into full effect.     Without the prescribed medication for ADHD, it is reported that the patient has problems with attention and focus including being easily distracted, easily losing objects, trouble with time management, trouble completing tasks because of  "distractions, procrastination, indecisiveness, careless mistakes in daily activities/work and not finishing jobs that are started. Due to the reported problems without the medication usage, as well as the significant improvement in symptoms with the medication usage, the patient requests to remain on the prescribed medication for ADHD. Patients has been educated on the risks involved with stimulants, such as cardiac complications, weight loss, the potential for dependence and tolerance, mood and behavior changes, and decrease in seizure threshold. Patient is also informed that the medication is to be used by the patient only, the medication is to be used only as directed, and the medication should not be combined with other substances unless directed by a Provider/Prescriber. The patient verbalized understanding and agreement with this in their own words, and wish to continue with current treatment plan.    Medication and treatment options, both pharmacological and non-pharmacological treatment options, discussed during today's visit, including any off label use of medication. Patient acknowledged and verbally consented with current treatment plan and was educated on the importance of compliance with treatment and follow-up appointments.        Controlled Substance Medication Contract    Controlled substance medications (i.e. benzodiazepines, opioids, amphetamines) are very useful, but have a high potential for misuse and are, therefore, closely controlled by local, state, and federal government(s). As a patient of [facility], you agree and understand the followin. I am responsible for the controlled substance medications prescribed to me. If my prescription is misplaced, stolen, or if \"I run out early,\" I understand this medication will not be replaced regardless of the circumstances.  2. Refills of controlled substance medications: (a) Will be made only during regular office " "hours Monday-Thursday once a month and during a scheduled virtual appointment. Refills will not be made at night, weekends, or on holidays. (b) Will not be made if \"I lost my prescriptions,\" \"ran out early,\" or \"misplaced my medication\". I am solely responsible for taking the medication as prescribed and for keeping track of the remaining.   3. I agree to comply with urine drug testing and pill counts at the provider's discretion, thereby, documenting the proper use of any medications. If alcohol abuse is suspected, a breathalyzer or blood alcohol level may be ordered. Unannounced urine or serum toxicology specimens may be requested and my cooperation is required.  4. I understand that if I violate any of the above conditions, my prescriptions for controlled medications my be terminated. If the violation involves obtaining these medications from another individual, or the concomitant use of non-prescription illicit (illegal) drugs, I may also be reported to other providers, pharmacies, medical facilities and the appropriate authorities.   5. I further understand that if I violate this controlled substance contract due to non-compliance of medical directions, such as the failure in taking medications as prescribed, utilizing other illicit drugs, or abuse of controlled medications, the prescription for controlled medications may be terminated.   6. I agree to keep my scheduled appointments and conduct myself in a courteous manner.  7. I agree not to sell, share, or give any medication to another person. I understand that such mishandling of my medication is a serious violation of this agreement and would result in my treatment being terminated without any recourse for appeal.   8. I agree not to take my medication from any physicians, nurse practitioners, pharmacies or other sources without telling my prescriber.  9. I agree to take my medication as my prescriber has instructed and not to alter the way I take my " medication without first consulting my prescriber.  10. I agree to abstain from problematic alcohol usage, opioids, marijuana, cocaine, and other addictive substances.   11. If I am legally involved related to legal or illegal drugs, including alcohol, refill of controlled substances will not be given until a re-evaluation of my chemical dependency treatment plan has been completed. Refills are at the discretion of the prescriber.   12. I agree to fill all of my controlled medications at an in-state ([place]) pharmacy.   13. I understand that [facility] utilizes the [place] All Schedule Prescription Electronic Reporting (GO) system and will monitor my prescription history via this source.  14. Benzodiazepines are drugs prescribed to treat conditions like anxiety, insomnia, and seizures. Examples of these drugs include: alprazolam, clonazepam, diazepam, and lorazepam. The FDA has applied a Black Box Warning (one of the strictest warnings) that the use of opioids and benzodiazepines together have serious risks to include unusual dizziness or lightheadedness, extreme sleepiness, slowed or difficult breathing, coma and death. There is an added risk if alcohol is also ingested. It is the policy of [facility] to NOT prescribe benzodiazepines to patients who also use opioids. If a patient already presents already prescribed both, the prescriber my direct the patient to their previous provider who prescribed it or taper the benzodiazepine as part of the treatment plan. These patients must be monitored at appropriate intervals and so visits may be more frequent.     This APRN has discussed and reviewed this information with the patient and/or guardian. The patient and/or guardian verbally agreed (no signatures are obtained during today's visit as they are a telehealth patient and is unable to print and sign this document, therefore, verbal agreement has been  obtained).       MEDICATION ISSUES:    Discussed treatment plan and medication options of prescribed medication as well as the risks, benefits, any black box warnings, and side effects including potential falls, possible impaired driving, and metabolic adversities among others, including any off label use of medication. Patient is agreeable to call the office with any worsening of symptoms or onset of side effects, or if any concerns or questions arise.  The contact information for the office is made available to the patient. Patient is agreeable to call 911 or go to the nearest ER should they begin having any SI/HI, or if any urgent concerns arise.       MEDS ORDERED DURING VISIT:  No orders of the defined types were placed in this encounter.      Return in about 7 weeks (around 2/28/2024), or if symptoms worsen or fail to improve, for Recheck.     Treatment plan completed on 1/10/24    Progress toward goal: Not at goal    Functional Status: Mild impairment     Prognosis: Good with Ongoing Treatment         This document has been electronically signed by MICHEL Guerrero  January 10, 2024 09:11 EST     Some of the data in this electronic note has been brought forward from a previous encounter, any necessary changes have been made, it has been reviewed by this APRN, and it is accurate.    Please note that portions of this note were completed with a voice recognition program. Efforts were made to edit dictation, but occasionally words are mistranscribed.

## 2024-01-22 ENCOUNTER — OFFICE VISIT (OUTPATIENT)
Dept: FAMILY MEDICINE CLINIC | Facility: CLINIC | Age: 39
End: 2024-01-22
Payer: COMMERCIAL

## 2024-01-22 VITALS
HEIGHT: 66 IN | DIASTOLIC BLOOD PRESSURE: 70 MMHG | BODY MASS INDEX: 27.32 KG/M2 | TEMPERATURE: 98 F | SYSTOLIC BLOOD PRESSURE: 116 MMHG | HEART RATE: 82 BPM | WEIGHT: 170 LBS | OXYGEN SATURATION: 100 %

## 2024-01-22 DIAGNOSIS — G89.29 CHRONIC ELBOW PAIN, RIGHT: Primary | ICD-10-CM

## 2024-01-22 DIAGNOSIS — M25.521 CHRONIC ELBOW PAIN, RIGHT: Primary | ICD-10-CM

## 2024-01-22 DIAGNOSIS — M77.11 RIGHT TENNIS ELBOW: ICD-10-CM

## 2024-01-22 DIAGNOSIS — M54.2 NECK PAIN: ICD-10-CM

## 2024-01-22 DIAGNOSIS — M62.838 MUSCLE SPASM: ICD-10-CM

## 2024-01-22 DIAGNOSIS — R22.31 SKIN LUMP OF ARM, RIGHT: ICD-10-CM

## 2024-01-22 PROCEDURE — 99214 OFFICE O/P EST MOD 30 MIN: CPT | Performed by: NURSE PRACTITIONER

## 2024-01-22 RX ORDER — NITROGLYCERIN 20 MG/G
OINTMENT TOPICAL
COMMUNITY
Start: 2023-12-01

## 2024-01-22 RX ORDER — CYCLOBENZAPRINE HCL 10 MG
10 TABLET ORAL 3 TIMES DAILY PRN
Qty: 30 TABLET | Refills: 0 | Status: SHIPPED | OUTPATIENT
Start: 2024-01-22

## 2024-01-22 RX ORDER — INDOMETHACIN 50 MG/1
50 CAPSULE ORAL 3 TIMES DAILY PRN
Qty: 30 CAPSULE | Refills: 0 | Status: SHIPPED | OUTPATIENT
Start: 2024-01-22

## 2024-01-22 NOTE — PROGRESS NOTES
Chief Complaint  Arm Pain (Right Arm/Elbow Pain)    Subjective          Honey Thomas presents to [facility]  History of Present Illness  Patient is a 39 yo female. She is here for recurrent right lower arm with elbow pain more than 6 weeks. It originally started with having to type an extensive amount at work. She has been using OTC medication and prn flexeril. Doing stretches, massage, avoiding lifting or tugging with her right arm. 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       She is also concerned with 2 lumps on her lower forearm. She noticed several weeks ago. She denies any pain to the lumps. No erythema or drainage. The lesions on soft, circular, movable. Appears to be lipoma's. She does have a hx of having one removed. Will continue to monitor.   They are small.       The following portions of the patient's history were reviewed and updated as appropriate: allergies, current medications, past family history, past medical history, past social history, past surgical history and problem list.    Review of Systems   HENT: Negative.     Respiratory: Negative.     Cardiovascular: Negative.    Gastrointestinal: Negative.    Musculoskeletal:  Positive for myalgias.        Right lower for arm - 2 lumps   Right elbow decreased ROM        Hematological: Negative.    Psychiatric/Behavioral: Negative.     Answers submitted by the patient for this visit:  Other (Submitted on 1/22/2024)  Please describe your symptoms.: Right elbow/forearm pain >6 weeks, limiting ADLs  Have you had these symptoms before?: No  How long have you been having these symptoms?: Greater than 2 weeks  Please list any medications you are currently taking for this condition.: Occassional ibuprofen & Flexeril  Please describe any probable cause for these symptoms. : Repetitive use, typing.  Primary Reason for Visit (Submitted on 1/22/2024)  What is the  "primary reason for your visit?: Other        Objective   Vital Signs:   /70 (BP Location: Right leg, Patient Position: Sitting, Cuff Size: Adult)   Pulse 82   Temp 98 °F (36.7 °C) (Temporal)   Ht 167.6 cm (66\")   Wt 77.1 kg (170 lb)   SpO2 100%   BMI 27.44 kg/m²           PHQ-2/9 Depression Screening  PHQ-9 Total Score:              Physical Exam  Vitals reviewed.   HENT:      Head: Normocephalic.   Eyes:      Pupils: Pupils are equal, round, and reactive to light.   Pulmonary:      Effort: Pulmonary effort is normal.      Breath sounds: Normal breath sounds.   Musculoskeletal:      Right elbow: Swelling present. Decreased range of motion. Tenderness present.   Skin:     General: Skin is warm.      Capillary Refill: Capillary refill takes less than 2 seconds.      Findings: Lesion present.             Comments: Right elbow- slight edema      Neurological:      Mental Status: She is alert and oriented to person, place, and time.   Psychiatric:         Mood and Affect: Mood normal.         Thought Content: Thought content normal.         Judgment: Judgment normal.        Result Review :                 Assessment and Plan    Diagnoses and all orders for this visit:    1. Chronic elbow pain, right (Primary)  -     indomethacin (INDOCIN) 50 MG capsule; Take 1 capsule by mouth 3 (Three) Times a Day As Needed for Mild Pain. (Patient not taking: Reported on 1/24/2024)  Dispense: 30 capsule; Refill: 0  -     Ambulatory Referral to Orthopedic Surgery    2. Right tennis elbow  -     Ambulatory Referral to Orthopedic Surgery    3. Neck pain    4. Muscle spasm  -     cyclobenzaprine (FLEXERIL) 10 MG tablet; Take 1 tablet by mouth 3 (Three) Times a Day As Needed for Muscle Spasms.  Dispense: 30 tablet; Refill: 0    5. Skin lump of arm, right  Continue to monitor - may need us of skin in the future.     Referral to orthopedics due to prolonged pain/ decreased ROM, interfering with her job duties.   Neck pain - " flexeril prn, continue OTC  antiinflammatories.   Follow up for pain and possible lipomas.         Follow Up   Return in about 3 months (around 4/22/2024) for annual fasting.  Patient was given instructions and counseling regarding her condition or for health maintenance advice. Please see specific information pulled into the AVS if appropriate.

## 2024-01-23 DIAGNOSIS — M25.521 RIGHT ELBOW PAIN: Primary | ICD-10-CM

## 2024-01-24 ENCOUNTER — TELEPHONE (OUTPATIENT)
Age: 39
End: 2024-01-24

## 2024-01-24 ENCOUNTER — OFFICE VISIT (OUTPATIENT)
Age: 39
End: 2024-01-24
Payer: COMMERCIAL

## 2024-01-24 VITALS
WEIGHT: 168 LBS | HEIGHT: 65 IN | SYSTOLIC BLOOD PRESSURE: 105 MMHG | BODY MASS INDEX: 27.99 KG/M2 | DIASTOLIC BLOOD PRESSURE: 85 MMHG

## 2024-01-24 DIAGNOSIS — M77.11 LATERAL EPICONDYLITIS OF RIGHT ELBOW: Primary | ICD-10-CM

## 2024-01-24 NOTE — TELEPHONE ENCOUNTER
Physical therapy will be able to provide specific product information regarding keyboard, mouse, and mouse pad recommendations.

## 2024-01-24 NOTE — PROGRESS NOTES
[facility]     Office Visit       Date: 01/24/2024   Patient Name: Honey Thomas  MRN: [id number]  YOB: 1985    Referring Physician: Vinita Love APRN     Chief Complaint:   Chief Complaint   Patient presents with    Right Elbow - Edema     History of Present Illness:   Honey Thomas is a 38 y.o. female right-hand-dominant send to clinic complains of right lateral elbow pain.  This began without injury or trauma in November 2023 after excessive typing during work meeting.  She developed pain on the lateral aspect of her elbow this worsened with time and has been associated with swelling and stiffness to the elbow.  She reports her pain is worse with wrist extension and carrying objects.  This is now radiating up her arm into her shoulder.  She denies any numbness or tingling.  She has been using anti-inflammatories and massage with minimal improvements.  No other complaints or concerns.    Subjective   Review of Systems:   Review of Systems   Constitutional:  Negative for chills, fever, unexpected weight gain and unexpected weight loss.   HENT:  Negative for congestion, postnasal drip and rhinorrhea.    Eyes:  Negative for blurred vision.   Respiratory:  Negative for shortness of breath.    Cardiovascular:  Negative for leg swelling.   Gastrointestinal:  Negative for abdominal pain, nausea and vomiting.   Genitourinary:  Negative for difficulty urinating.   Musculoskeletal:  Positive for arthralgias. Negative for gait problem, joint swelling and myalgias.   Skin:  Negative for skin lesions and wound.   Neurological:  Negative for dizziness, weakness, light-headedness and numbness.   Hematological:  Does not bruise/bleed easily.   Psychiatric/Behavioral:  Negative for depressed mood.    All other systems reviewed and are negative.     Pertinent review of systems per HPI    I reviewed the patient's chief  "complaint, history of present illness, review of systems, past medical history, surgical history, family history, social history, medications and allergy list in the EMR on 01/24/2024 and agree with the findings above.    Objective    Quality Measures:   ACP:   ACP discussion was declined by the patient.      Tobacco:   Honey Thomas  reports that she quit smoking about 10 years ago. Her smoking use included cigarettes. She has a 5.00 pack-year smoking history. She has been exposed to tobacco smoke. She has never used smokeless tobacco..     Vital Signs:   Vitals:    01/24/24 0950   BP: 105/85   Weight: 76.2 kg (168 lb)   Height: 165.1 cm (65\")     BMI:      General: No acute distress. Alert and oriented.     Ortho Exam:  Examination of the right upper extremity demonstrates no deformity.  No skin lesions or abrasions.  There is swelling noted to the lateral aspect of the elbow overlying the lateral epicondyle.  There is a normal carrying angle of the elbow.  Patient has full flexion, extension, pronation and supination.  There is tenderness noted at the lateral epicondyle and proximal along the extensor mass origin.  There is pain noted with resisted long finger extension and resisted wrist extension.  She is nontender along the medial aspect of the elbow and ulnar nerve.  She is able to make a composite fist.  Sensation intact to light touch throughout the hand.  Warm well-perfused distally.    Imaging / Studies:    Imaging Results (Last 24 Hours)       ** No results found for the last 24 hours. **          Assessment / Plan    Assessment/Plan:   Honey Thomas is a 38 y.o. female with right lateral epicondylitis.    I discussed with the patient their clinical and radiographic findings demonstrate lateral epicondylitis.  We had a lengthy discussion regarding the pathophysiology of their diagnosis. I discussed that in terms of the natural history of the condition, it can sometimes take many months and up " to a year to improve. Both conservative and surgical options were discussed.  Conservative treatments in the form of: observation, activity modification including avoiding lifting with the hand pronated and instead lifting with the hand supinated, oral anti-inflammatories, physical therapy, counterforce brace, and steroid injection were presented.  I discussed that for the steroid injection, the literature proves this to be no better than a placebo, however, it is still a potential option for the patient.  Operative treatments in the form of lateral epicondylar debridement was briefly discussed if symptoms remain refractory after 18 months.  After expressing understanding of all options, the patient elects to proceed with counterforce bracing, wrist bracing, anti-inflammatories, and physical therapy referral.  Like to see her back in 2 months for reevaluation of her pain after a course of physical therapy and bracing.  They were agreeable with the plan.  All questions and concerns were addressed.        ICD-10-CM ICD-9-CM   1. Lateral epicondylitis of right elbow  M77.11 726.32     Follow Up:   Return in about 2 months (around 3/24/2024) for Follow Up.      Honey Matos MD  [facility]

## 2024-01-24 NOTE — TELEPHONE ENCOUNTER
Patient seen earlier today and was asking if you had any ergonomic suggestions for her using a specific type of mouse, keyboard or desk set up that might help with her elbow and wrist.    Sandra VAZQUEZ) ROT

## 2024-02-08 DIAGNOSIS — F90.2 ATTENTION DEFICIT HYPERACTIVITY DISORDER, COMBINED TYPE: Chronic | ICD-10-CM

## 2024-02-08 RX ORDER — DEXTROAMPHETAMINE SACCHARATE, AMPHETAMINE ASPARTATE MONOHYDRATE, DEXTROAMPHETAMINE SULFATE AND AMPHETAMINE SULFATE 6.25; 6.25; 6.25; 6.25 MG/1; MG/1; MG/1; MG/1
25 CAPSULE, EXTENDED RELEASE ORAL EVERY MORNING
Qty: 30 CAPSULE | Refills: 0 | Status: SHIPPED | OUTPATIENT
Start: 2024-02-08

## 2024-02-08 RX ORDER — DEXTROAMPHETAMINE SACCHARATE, AMPHETAMINE ASPARTATE, DEXTROAMPHETAMINE SULFATE AND AMPHETAMINE SULFATE 2.5; 2.5; 2.5; 2.5 MG/1; MG/1; MG/1; MG/1
TABLET ORAL
Qty: 30 TABLET | Refills: 0 | Status: SHIPPED | OUTPATIENT
Start: 2024-02-08

## 2024-03-05 ENCOUNTER — TELEPHONE (OUTPATIENT)
Dept: PSYCHIATRY | Facility: CLINIC | Age: 39
End: 2024-03-05
Payer: COMMERCIAL

## 2024-03-05 NOTE — TELEPHONE ENCOUNTER
Canceled patients appt for 03/06/2024 and left VM and MyChart message for them to call back and get rescheduled.

## 2024-03-20 DIAGNOSIS — F90.2 ATTENTION DEFICIT HYPERACTIVITY DISORDER, COMBINED TYPE: Chronic | ICD-10-CM

## 2024-03-20 RX ORDER — DEXTROAMPHETAMINE SACCHARATE, AMPHETAMINE ASPARTATE, DEXTROAMPHETAMINE SULFATE AND AMPHETAMINE SULFATE 2.5; 2.5; 2.5; 2.5 MG/1; MG/1; MG/1; MG/1
TABLET ORAL
Qty: 30 TABLET | Refills: 0 | Status: SHIPPED | OUTPATIENT
Start: 2024-03-20

## 2024-03-20 RX ORDER — DEXTROAMPHETAMINE SACCHARATE, AMPHETAMINE ASPARTATE MONOHYDRATE, DEXTROAMPHETAMINE SULFATE AND AMPHETAMINE SULFATE 6.25; 6.25; 6.25; 6.25 MG/1; MG/1; MG/1; MG/1
25 CAPSULE, EXTENDED RELEASE ORAL EVERY MORNING
Qty: 30 CAPSULE | Refills: 0 | Status: SHIPPED | OUTPATIENT
Start: 2024-03-20

## 2024-04-24 ENCOUNTER — TELEMEDICINE (OUTPATIENT)
Dept: PSYCHIATRY | Facility: CLINIC | Age: 39
End: 2024-04-24
Payer: COMMERCIAL

## 2024-04-24 DIAGNOSIS — F41.8 SITUATIONAL ANXIETY: ICD-10-CM

## 2024-04-24 DIAGNOSIS — F90.2 ATTENTION DEFICIT HYPERACTIVITY DISORDER, COMBINED TYPE: Primary | Chronic | ICD-10-CM

## 2024-04-24 PROCEDURE — 99213 OFFICE O/P EST LOW 20 MIN: CPT | Performed by: NURSE PRACTITIONER

## 2024-04-24 RX ORDER — DEXTROAMPHETAMINE SACCHARATE, AMPHETAMINE ASPARTATE, DEXTROAMPHETAMINE SULFATE AND AMPHETAMINE SULFATE 2.5; 2.5; 2.5; 2.5 MG/1; MG/1; MG/1; MG/1
TABLET ORAL
Qty: 30 TABLET | Refills: 0 | Status: SHIPPED | OUTPATIENT
Start: 2024-04-24

## 2024-04-24 RX ORDER — ESCITALOPRAM OXALATE 10 MG/1
TABLET ORAL
Qty: 30 TABLET | Refills: 1 | Status: SHIPPED | OUTPATIENT
Start: 2024-04-24

## 2024-04-24 RX ORDER — DEXTROAMPHETAMINE SACCHARATE, AMPHETAMINE ASPARTATE MONOHYDRATE, DEXTROAMPHETAMINE SULFATE AND AMPHETAMINE SULFATE 6.25; 6.25; 6.25; 6.25 MG/1; MG/1; MG/1; MG/1
25 CAPSULE, EXTENDED RELEASE ORAL EVERY MORNING
Qty: 30 CAPSULE | Refills: 0 | Status: SHIPPED | OUTPATIENT
Start: 2024-04-24

## 2024-04-24 NOTE — PROGRESS NOTES
"This provider is completing this appointment through [facility] (through [facility]), [address], [place], [address] using a secure [employer] Video Visit through [email or web address]. Patient is being seen remotely via telehealth at their residence in [place], and stated they are in a secure environment for this session. The patient's condition being diagnosed/treated is appropriate for telemedicine. The provider identified herself as well as her credentials.   The patient, and/or patients guardian, consent to be seen remotely, and when consent is given they understand that the consent allows for patient identifiable information to be sent to a third party as needed.   They may refuse to be seen remotely at any time. The electronic data is encrypted and password protected, and the patient and/or guardian has been advised of the potential risks to privacy not withstanding such measures.    You have chosen to receive care through a telehealth visit.  Do you consent to use a video/audio connection for your medical care today? Yes    Patient identifiers utilized: Name and date of birth.        Subjective   Honey Thomas is a 38 y.o. female who presents today for follow up    Chief Complaint:  ADHD and anxiety    Accompanied by: Pt was alone for duration of appointment    History of Present Illness:   *Pt was last seen by this APRN on 1/10/24. When pt was due for a refill of medications, she requested Adderall XR and Adderall IR through the [employer] [facility]. This APRN sent refills in on both of those medications. Therefore, Vyvanse was not started.*     Pt states she has been \"okay\". Pt always feels overwhelmed due to both her jobs. She never feels as if her job is completed at the end of the day. Pt admits that she has issues managing money, which is why she feels the need to have two jobs. Encouraged pt to engage in more self-care activities. Pt bought herself ear buds that have noise " control. Pt is having issues with dull aches, burning sensation, and numbness/tingling in area of her body. She is having joint stiffness. Pt also complains of tinnitus. Pt has an appointment with her PCP on May 1st. Pt has not been taking the hydroxyzine. Pt's overwhelming anxiety is affecting her work and personal life. Pt is open to a trial of Lexapro. Due to the shortage of generic Vyvanse, pt would like to continue the Adderall XR and Adderall combination to control ADHD symptoms. Pt denies having experienced any chest pain/discomfort, shortness of breath, and/or other cardiac symptoms since their last appointment. The patient reports compliance with current medication regimen. The patient denies any current side effects from their current medication regimen. The patient rates their anxiety on average the past week at a 5-6/10 on a 0-10 scale, with 10 being the worst. The patient denies any suicidal or homicidal ideations, plans, or intent at today's encounter and is convincing. The patient denies any auditory hallucinations or visual hallucinations. The patient does not endorse any significant symptoms consistent with lulu or psychosis at today's encounter.     *If the patient has any concerns or needs assistance, they may call the [facility] at [phone]*        Prior Psychiatric Medications:  Buspar: pt struggled to remember to take multiple times a day  Zoloft: ineffective  Wellbutrin XL: ineffective  Paxil: ineffective  Strattera: nausea, irritability        The following portions of the patient's history were reviewed and updated as appropriate: allergies, current medications, past family history, past medical history, past social history, past surgical history and problem list.          Past Medical History:  Past Medical History:   Diagnosis Date    ADHD (attention deficit hyperactivity disorder) 2/2021    Allergic     Anxiety     BPPV (benign paroxysmal positional  vertigo)     Carpal tunnel syndrome 2010    bilat    TMJ (dislocation of temporomandibular joint) 02/04/2021    Vitamin D deficiency        Social History:  Social History     Socioeconomic History    Marital status:    Tobacco Use    Smoking status: Former     Current packs/day: 0.00     Average packs/day: 0.5 packs/day for 10.0 years (5.0 ttl pk-yrs)     Types: Cigarettes     Start date: 2004     Quit date: 2014     Years since quitting: 10.3     Passive exposure: Past    Smokeless tobacco: Never   Vaping Use    Vaping status: Never Used   Substance and Sexual Activity    Alcohol use: Yes     Comment: 1 or less beers per week    Drug use: No    Sexual activity: Yes     Partners: Male     Birth control/protection: Surgical, Bilateral salpingectomy , Hysterectomy       Family History:  Family History   Problem Relation Age of Onset    Hypertension Father     Diabetes Father     Hypertension Mother     Ovarian cancer Mother 37    Graves' disease Sister     Scoliosis Sister     Sleep apnea Sister     Lung cancer Maternal Grandmother     Alzheimer's disease Paternal Grandmother     Other Paternal Grandfather         accident    Autism Niece        Past Surgical History:  Past Surgical History:   Procedure Laterality Date    ABDOMINOPLASTY N/A 11/25/2019    Procedure: ABDOMINOPLASTY;  Surgeon: Alphonso Vasquez MD;  Location:  [facility];  Service: Plastics    BREAST AUGMENTATION Bilateral 11/25/2019    Procedure: BREAST AUGMENTATION WITH IMPLANTS BILATERAL;  Surgeon: Alphonso Vasquez MD;  Location:  [facility];  Service: Plastics    BREAST SURGERY  11/25/19    Augmentation    COSMETIC SURGERY  11/25/19    Abdominoplasty; breast augmentation    FRACTURE SURGERY Left 1995    lEFT FOREARM    TOTAL LAPAROSCOPIC HYSTERECTOMY SALPINGO OOPHORECTOMY N/A 11/25/2019    Procedure: TOTAL ROBOTIC HYSTERECTOMY WITH BILATERAL SALPINGO-OOPHORECTOMY;  Surgeon: Summer Dorsey DO;  Location:  [facility];  Service: [place]     WISDOM TOOTH EXTRACTION         Problem List:  Patient Active Problem List   Diagnosis     (normal spontaneous vaginal delivery)    Vitamin D deficiency    Anxiety    BPPV (benign paroxysmal positional vertigo)    Carpal tunnel syndrome    TMJ (dislocation of temporomandibular joint)    Family history of ovarian cancer    Mold exposure    Elevated liver enzymes    Lateral epicondylitis of right elbow       Allergy:   No Known Allergies     Current Medications:   Current Outpatient Medications   Medication Sig Dispense Refill    amphetamine-dextroamphetamine (Adderall) 10 MG tablet Take 1/2-1 tablet PO every afternoon PRN 30 tablet 0    amphetamine-dextroamphetamine XR (Adderall XR) 25 MG 24 hr capsule Take 1 capsule by mouth Every Morning 30 capsule 0    cyclobenzaprine (FLEXERIL) 10 MG tablet Take 1 tablet by mouth 3 (Three) Times a Day As Needed for Muscle Spasms. 30 tablet 0    escitalopram (Lexapro) 10 MG tablet Take 1/2 tablet PO daily x7 days, then increase to 1 tablet PO Daily 30 tablet 1    estradiol (ESTRACE) 1 MG tablet TAKE 1 TABLET BY MOUTH EVERY DAY 14 tablet 0    hydrOXYzine (ATARAX) 25 MG tablet TAKE 1-2 TABLETS BY MOUTH 3 TIMES DAILY AS NEEDED FOR ANXIETY/SLEEP 90 tablet 0    indomethacin (INDOCIN) 50 MG capsule Take 1 capsule by mouth 3 (Three) Times a Day As Needed for Mild Pain. (Patient not taking: Reported on 2024) 30 capsule 0    naproxen (Naprosyn) 500 MG tablet Take 1 tablet by mouth 2 (Two) Times a Day With Meals. 60 tablet 2    Nitro-Bid 2 % ointment APPLY TOPICALLY TO THE BASE OF TOES TWICE DAILY      ondansetron ODT (Zofran ODT) 4 MG disintegrating tablet Place 1 tablet on the tongue Every 8 (Eight) Hours As Needed for Nausea or Vomiting. 20 tablet 0     No current facility-administered medications for this visit.       Review of Symptoms:    Review of Systems   Constitutional:  Positive for fatigue.   HENT:  Positive for tinnitus.    Musculoskeletal:         Joint stiffness    Neurological:         Burning sensation, numbness/tingling in extremities   Psychiatric/Behavioral:  Positive for decreased concentration and stress. The patient is nervous/anxious.          Physical Exam:   Due to the remote nature of this encounter (virtual encounter), vitals were unable to be obtained.  Height stated at 66 inches.  Weight stated at 170 pounds.         Physical Exam  Neurological:      Mental Status: She is alert.   Psychiatric:         Attention and Perception: Attention and perception normal.         Mood and Affect: Affect normal. Mood is anxious.         Speech: Speech normal.         Behavior: Behavior normal. Behavior is cooperative.         Thought Content: Thought content normal. Thought content is not paranoid or delusional. Thought content does not include homicidal or suicidal ideation. Thought content does not include homicidal or suicidal plan.         Cognition and Memory: Cognition and memory normal.         Judgment: Judgment normal. Judgment is impulsive.      Comments: Stress         Mental Status Exam:   Hygiene:   good  Cooperation:  Cooperative  Eye Contact:  Good  Psychomotor Behavior:  Appropriate  Affect:  Full range  Mood: anxious  Speech:  Normal  Thought Process:  Linear  Thought Content:  Mood congruent  Suicidal:  None  Homicidal:  None  Hallucinations:  None  Delusion:  None  Memory:  Intact  Orientation:  Person, Place, Time and Situation  Reliability:  good  Insight:  Good  Judgement:  Good  Impulse Control:  Fair        PHQ-9 Depression Screening  Little interest or pleasure in doing things? (P) 1-->several days   Feeling down, depressed, or hopeless? (P) 1-->several days   Trouble falling or staying asleep, or sleeping too much? (P) 1-->several days   Feeling tired or having little energy? (P) 1-->several days   Poor appetite or overeating? (P) 1-->several days   Feeling bad about yourself - or that you are a failure or have let yourself or your family down? (P)  0-->not at all   Trouble concentrating on things, such as reading the newspaper or watching television? (P) 2-->more than half the days   Moving or speaking so slowly that other people could have noticed? Or the opposite - being so fidgety or restless that you have been moving around a lot more than usual? (P) 2-->more than half the days   Thoughts that you would be better off dead, or of hurting yourself in some way? (P) 0-->not at all   PHQ-9 Total Score (P) 9   If you checked off any problems, how difficult have these problems made it for you to do your work, take care of things at home, or get along with other people? (P) somewhat difficult     PHQ-9 Total Score: (P) 9      DA-7  Feeling nervous, anxious or on edge: (P) Several days  Not being able to stop or control worrying: (P) Not at all  Worrying too much about different things: (P) Several days  Trouble Relaxing: (P) More than half the days  Being so restless that it is hard to sit still: (P) More than half the days  Feeling afraid as if something awful might happen: (P) Not at all  Becoming easily annoyed or irritable: (P) Several days  DA 7 Total Score: (P) 7  If you checked any problems, how difficult have these problems made it for you to do your work, take care of things at home, or get along with other people: (P) Somewhat difficult      [facility] request number [id number] reviewed by this APRN at today's encounter.    Previous Provider notes and available records reviewed by this APRN at today's encounter.         Lab Results:   No visits with results within 1 Month(s) from this visit.   Latest known visit with results is:   Lab on 07/21/2023   Component Date Value Ref Range Status    Glucose 07/21/2023 81  65 - 99 mg/dL Final    BUN 07/21/2023 13  6 - 20 mg/dL Final    Creatinine 07/21/2023 1.01 (H)  0.57 - 1.00 mg/dL Final    Sodium 07/21/2023 140  136 - 145 mmol/L Final    Potassium 07/21/2023 4.1  3.5 - 5.2 mmol/L Final    Chloride 07/21/2023 102   98 - 107 mmol/L Final    CO2 07/21/2023 24.2  22.0 - 29.0 mmol/L Final    Calcium 07/21/2023 10.0  8.6 - 10.5 mg/dL Final    Total Protein 07/21/2023 7.5  6.0 - 8.5 g/dL Final    Albumin 07/21/2023 5.0  3.5 - 5.2 g/dL Final    ALT (SGPT) 07/21/2023 19  1 - 33 U/L Final    AST (SGOT) 07/21/2023 25  1 - 32 U/L Final    Alkaline Phosphatase 07/21/2023 87  39 - 117 U/L Final    Total Bilirubin 07/21/2023 0.4  0.0 - 1.2 mg/dL Final    Globulin 07/21/2023 2.5  gm/dL Final    A/G Ratio 07/21/2023 2.0  g/dL Final    BUN/Creatinine Ratio 07/21/2023 12.9  7.0 - 25.0 Final    Anion Gap 07/21/2023 13.8  5.0 - 15.0 mmol/L Final    eGFR 07/21/2023 72.8  >60.0 mL/min/1.73 Final    HIV-1/ HIV-2 07/21/2023 Non-Reactive  Non-Reactive Final    A non-reactive test result does not preclude the possibility of exposure to HIV or infection with HIV. An antibody response to recent exposure may take several months to reach detectable levels.    Hepatitis B Surface Ag 07/21/2023 Non-Reactive  Non-Reactive Final    Hep A IgM 07/21/2023 Non-Reactive  Non-Reactive Final    Hep B C IgM 07/21/2023 Non-Reactive  Non-Reactive Final    Hepatitis C Ab 07/21/2023 Non-Reactive  Non-Reactive Final         Assessment & Plan   Problems Addressed this Visit    None  Visit Diagnoses       Attention deficit hyperactivity disorder, combined type  (Chronic)   -  Primary    Relevant Medications    escitalopram (Lexapro) 10 MG tablet    amphetamine-dextroamphetamine XR (Adderall XR) 25 MG 24 hr capsule    amphetamine-dextroamphetamine (Adderall) 10 MG tablet    Other Relevant Orders    Urine Drug Screen - Urine, Clean Catch    Situational anxiety        Relevant Medications    escitalopram (Lexapro) 10 MG tablet          Diagnoses         Codes Comments    Attention deficit hyperactivity disorder, combined type    -  Primary ICD-10-CM: F90.2  ICD-9-CM: 314.01     Situational anxiety     ICD-10-CM: F41.8  ICD-9-CM: 300.09             Visit Diagnoses:    ICD-10-CM  ICD-9-CM   1. Attention deficit hyperactivity disorder, combined type  F90.2 314.01   2. Situational anxiety  F41.8 300.09           GOALS:  Short Term Goals: Patient will be compliant with medication, and patient will have no significant medication related side effects.  Patient will be engaged in psychotherapy as indicated.  Patient will report subjective improvement of symptoms.  Long term goals: To stabilize mood and treat/improve subjective symptoms, the patient will stay out of the hospital, the patient will be at an optimal level of functioning, and the patient will take all medications as prescribed.  The patient verbalized understanding and agreement with goals that were mutually set.      TREATMENT PLAN:   -Discontinue Vyvanse  -Continue Adderall XR 25 mg PO QAM for ADHD   -Continue Adderall 5-10 mg PO every afternoon PRN for ADHD   -Continue hydroxyzine 25-50 mg PO TID PRN for anxiety  -Start Lexapro 5 mg PO Daily x7 days, then increase to 10 mg PO Daily for anxiety  -Obtain UDS  -The patient has been made aware that the Biden Administration announced on January 30, 2023 that the COVID-19 Public Health Emergency (COVID-19 PHE) ended on May 11, 2023. The patient is aware that during the COVID-19 PHE a portion of the Robbi Hayley Act was waived, allowing controlled substances to be provided via telemedicine without in-person encounters. The patient is aware that with the COVID-19 PHE having ended the Robbi Hayley Act was to go back into full effect, meaning that prescriptions for controlled substances cannot be given via telemedicine without in-person encounters and meeting all requirements of the Robbi Hayley Act. Discussed with the patient that on May 10, 2023, the Drug Enforcement Agency (DAMIEN), jointly with the Substance Abuse and Mental Health Services Administration (SAMHSA), announced that they are issuing a temporary rule to extend certain exceptions granted to existing DAMIEN regulations in March 2020 as  a result of the COVID-19 PHE, in order to avoid lapses in care for patients. The full set of telemedicine flexibilities regarding prescription of controlled medications as were in place during the COVID-19 PHE will remain in place through December 31, 2024. The patient understands that they will need to establish care with a local provider in their area for an in-person evaluation and treatment with any controlled substance prior to this date as the [facility] is strictly a telemedicine clinic and cannot offer the patient an in-person evaluation to be compliant with the Robbi Hayley Act when it goes back into full effect.     Without the prescribed medication for ADHD, it is reported that the patient has problems with attention and focus including being easily distracted, easily losing objects, trouble with time management, trouble completing tasks because of distractions, procrastination, indecisiveness, careless mistakes in daily activities/work and not finishing jobs that are started. Due to the reported problems without the medication usage, as well as the significant improvement in symptoms with the medication usage, the patient requests to remain on the prescribed medication for ADHD. Patients has been educated on the risks involved with stimulants, such as cardiac complications, weight loss, the potential for dependence and tolerance, mood and behavior changes, and decrease in seizure threshold. Patient is also informed that the medication is to be used by the patient only, the medication is to be used only as directed, and the medication should not be combined with other substances unless directed by a Provider/Prescriber. The patient verbalized understanding and agreement with this in their own words, and wish to continue with current treatment plan.    Medication and treatment options, both pharmacological and non-pharmacological treatment options, discussed during  "today's visit, including any off label use of medication. Patient acknowledged and verbally consented with current treatment plan and was educated on the importance of compliance with treatment and follow-up appointments.        Controlled Substance Medication Contract    Controlled substance medications (i.e. benzodiazepines, opioids, amphetamines) are very useful, but have a high potential for misuse and are, therefore, closely controlled by local, state, and federal government(s). As a patient of [facility], you agree and understand the followin. I am responsible for the controlled substance medications prescribed to me. If my prescription is misplaced, stolen, or if \"I run out early,\" I understand this medication will not be replaced regardless of the circumstances.  2. Refills of controlled substance medications: (a) Will be made only during regular office hours Monday-Thursday once a month and during a scheduled virtual appointment. Refills will not be made at night, weekends, or on holidays. (b) Will not be made if \"I lost my prescriptions,\" \"ran out early,\" or \"misplaced my medication\". I am solely responsible for taking the medication as prescribed and for keeping track of the remaining.   3. I agree to comply with urine drug testing and pill counts at the provider's discretion, thereby, documenting the proper use of any medications. If alcohol abuse is suspected, a breathalyzer or blood alcohol level may be ordered. Unannounced urine or serum toxicology specimens may be requested and my cooperation is required.  4. I understand that if I violate any of the above conditions, my prescriptions for controlled medications my be terminated. If the violation involves obtaining these medications from another individual, or the concomitant use of non-prescription illicit (illegal) drugs, I may also be reported to other providers, pharmacies, medical facilities and the " appropriate authorities.   5. I further understand that if I violate this controlled substance contract due to non-compliance of medical directions, such as the failure in taking medications as prescribed, utilizing other illicit drugs, or abuse of controlled medications, the prescription for controlled medications may be terminated.   6. I agree to keep my scheduled appointments and conduct myself in a courteous manner.  7. I agree not to sell, share, or give any medication to another person. I understand that such mishandling of my medication is a serious violation of this agreement and would result in my treatment being terminated without any recourse for appeal.   8. I agree not to take my medication from any physicians, nurse practitioners, pharmacies or other sources without telling my prescriber.  9. I agree to take my medication as my prescriber has instructed and not to alter the way I take my medication without first consulting my prescriber.  10. I agree to abstain from problematic alcohol usage, opioids, marijuana, cocaine, and other addictive substances.   11. If I am legally involved related to legal or illegal drugs, including alcohol, refill of controlled substances will not be given until a re-evaluation of my chemical dependency treatment plan has been completed. Refills are at the discretion of the prescriber.   12. I agree to fill all of my controlled medications at an in-state ([place]) pharmacy.   13. I understand that [facility] utilizes the [place] All Schedule Prescription Electronic Reporting (GO) system and will monitor my prescription history via this source.  14. Benzodiazepines are drugs prescribed to treat conditions like anxiety, insomnia, and seizures. Examples of these drugs include: alprazolam, clonazepam, diazepam, and lorazepam. The FDA has applied a Black Box Warning (one of the strictest warnings) that the use of opioids and benzodiazepines  together have serious risks to include unusual dizziness or lightheadedness, extreme sleepiness, slowed or difficult breathing, coma and death. There is an added risk if alcohol is also ingested. It is the policy of [facility] to NOT prescribe benzodiazepines to patients who also use opioids. If a patient already presents already prescribed both, the prescriber my direct the patient to their previous provider who prescribed it or taper the benzodiazepine as part of the treatment plan. These patients must be monitored at appropriate intervals and so visits may be more frequent.     This APRN has discussed and reviewed this information with the patient and/or guardian. The patient and/or guardian verbally agreed (no signatures are obtained during today's visit as they are a telehealth patient and is unable to print and sign this document, therefore, verbal agreement has been obtained).       MEDICATION ISSUES:    Discussed treatment plan and medication options of prescribed medication as well as the risks, benefits, any black box warnings, and side effects including potential falls, possible impaired driving, and metabolic adversities among others, including any off label use of medication. Patient is agreeable to call the office with any worsening of symptoms or onset of side effects, or if any concerns or questions arise.  The contact information for the office is made available to the patient. Patient is agreeable to call 911 or go to the nearest ER should they begin having any SI/HI, or if any urgent concerns arise.       MEDS ORDERED DURING VISIT:  New Medications Ordered This Visit   Medications    escitalopram (Lexapro) 10 MG tablet     Sig: Take 1/2 tablet PO daily x7 days, then increase to 1 tablet PO Daily     Dispense:  30 tablet     Refill:  1    amphetamine-dextroamphetamine XR (Adderall XR) 25 MG 24 hr capsule     Sig: Take 1 capsule by mouth Every Morning     Dispense:  30  capsule     Refill:  0     This is a 30 day prescription    amphetamine-dextroamphetamine (Adderall) 10 MG tablet     Sig: Take 1/2-1 tablet PO every afternoon PRN     Dispense:  30 tablet     Refill:  0     Pt is also taking Adderall XR. This is a 30 day prescription       Return in about 7 weeks (around 6/12/2024), or if symptoms worsen or fail to improve, for Recheck.     Treatment plan completed on 1/10/24    Progress toward goal: Not at goal    Functional Status: Moderate impairment     Prognosis: Good with Ongoing Treatment         This document has been electronically signed by MICHEL Guerrero  April 24, 2024 16:31 EDT     Some of the data in this electronic note has been brought forward from a previous encounter, any necessary changes have been made, it has been reviewed by this APRN, and it is accurate.    Please note that portions of this note were completed with a voice recognition program. Efforts were made to edit dictation, but occasionally words are mistranscribed.

## 2024-05-01 ENCOUNTER — LAB (OUTPATIENT)
Dept: LAB | Facility: HOSPITAL | Age: 39
End: 2024-05-01
Payer: COMMERCIAL

## 2024-05-01 ENCOUNTER — OFFICE VISIT (OUTPATIENT)
Dept: FAMILY MEDICINE CLINIC | Facility: CLINIC | Age: 39
End: 2024-05-01
Payer: COMMERCIAL

## 2024-05-01 VITALS
RESPIRATION RATE: 21 BRPM | OXYGEN SATURATION: 97 % | TEMPERATURE: 98.5 F | BODY MASS INDEX: 29.46 KG/M2 | WEIGHT: 176.8 LBS | SYSTOLIC BLOOD PRESSURE: 112 MMHG | HEIGHT: 65 IN | HEART RATE: 95 BPM | DIASTOLIC BLOOD PRESSURE: 74 MMHG

## 2024-05-01 DIAGNOSIS — Z13.29 SCREENING FOR THYROID DISORDER: ICD-10-CM

## 2024-05-01 DIAGNOSIS — M54.16 CHRONIC RADICULAR LUMBAR PAIN: ICD-10-CM

## 2024-05-01 DIAGNOSIS — R79.89 LOW VITAMIN D LEVEL: ICD-10-CM

## 2024-05-01 DIAGNOSIS — R20.2 PINS AND NEEDLES SENSATION: ICD-10-CM

## 2024-05-01 DIAGNOSIS — Z13.1 SCREENING FOR DIABETES MELLITUS: ICD-10-CM

## 2024-05-01 DIAGNOSIS — R93.7 ABNORMAL X-RAY OF LUMBAR SPINE: ICD-10-CM

## 2024-05-01 DIAGNOSIS — G89.29 CHRONIC ELBOW PAIN, RIGHT: ICD-10-CM

## 2024-05-01 DIAGNOSIS — F90.2 ATTENTION DEFICIT HYPERACTIVITY DISORDER, COMBINED TYPE: ICD-10-CM

## 2024-05-01 DIAGNOSIS — M25.521 CHRONIC ELBOW PAIN, RIGHT: ICD-10-CM

## 2024-05-01 DIAGNOSIS — Z79.890 HORMONE REPLACEMENT THERAPY: ICD-10-CM

## 2024-05-01 DIAGNOSIS — M79.18 CHRONIC GLUTEAL PAIN: ICD-10-CM

## 2024-05-01 DIAGNOSIS — Z12.31 ENCOUNTER FOR SCREENING MAMMOGRAM FOR MALIGNANT NEOPLASM OF BREAST: ICD-10-CM

## 2024-05-01 DIAGNOSIS — R20.2 PINS AND NEEDLES SENSATION: Primary | ICD-10-CM

## 2024-05-01 DIAGNOSIS — G89.29 CHRONIC RADICULAR LUMBAR PAIN: ICD-10-CM

## 2024-05-01 DIAGNOSIS — T78.1XXA FOOD SENSITIVITY WITH GASTROINTESTINAL SYMPTOMS: ICD-10-CM

## 2024-05-01 DIAGNOSIS — G89.29 CHRONIC GLUTEAL PAIN: ICD-10-CM

## 2024-05-01 LAB
25(OH)D3 SERPL-MCNC: 31.6 NG/ML (ref 30–100)
ALBUMIN SERPL-MCNC: 4.7 G/DL (ref 3.5–5.2)
ALBUMIN/GLOB SERPL: 1.9 G/DL
ALP SERPL-CCNC: 91 U/L (ref 39–117)
ALT SERPL W P-5'-P-CCNC: 16 U/L (ref 1–33)
AMPHET+METHAMPHET UR QL: POSITIVE
AMPHETAMINES UR QL: NEGATIVE
ANION GAP SERPL CALCULATED.3IONS-SCNC: 11 MMOL/L (ref 5–15)
AST SERPL-CCNC: 16 U/L (ref 1–32)
BARBITURATES UR QL SCN: NEGATIVE
BASOPHILS # BLD AUTO: 0.06 10*3/MM3 (ref 0–0.2)
BASOPHILS NFR BLD AUTO: 1 % (ref 0–1.5)
BENZODIAZ UR QL SCN: NEGATIVE
BILIRUB SERPL-MCNC: 0.5 MG/DL (ref 0–1.2)
BUN SERPL-MCNC: 13 MG/DL (ref 6–20)
BUN/CREAT SERPL: 18.3 (ref 7–25)
BUPRENORPHINE SERPL-MCNC: NEGATIVE NG/ML
CALCIUM SPEC-SCNC: 9.3 MG/DL (ref 8.6–10.5)
CANNABINOIDS SERPL QL: NEGATIVE
CHLORIDE SERPL-SCNC: 104 MMOL/L (ref 98–107)
CHOLEST SERPL-MCNC: 190 MG/DL (ref 0–200)
CO2 SERPL-SCNC: 25 MMOL/L (ref 22–29)
COCAINE UR QL: NEGATIVE
CREAT SERPL-MCNC: 0.71 MG/DL (ref 0.57–1)
DEPRECATED RDW RBC AUTO: 41.8 FL (ref 37–54)
EGFRCR SERPLBLD CKD-EPI 2021: 110.4 ML/MIN/1.73
EOSINOPHIL # BLD AUTO: 0.12 10*3/MM3 (ref 0–0.4)
EOSINOPHIL NFR BLD AUTO: 2 % (ref 0.3–6.2)
ERYTHROCYTE [DISTWIDTH] IN BLOOD BY AUTOMATED COUNT: 12.6 % (ref 12.3–15.4)
FENTANYL UR-MCNC: NEGATIVE NG/ML
FOLATE SERPL-MCNC: 17.4 NG/ML (ref 4.78–24.2)
GLOBULIN UR ELPH-MCNC: 2.5 GM/DL
GLUCOSE SERPL-MCNC: 89 MG/DL (ref 65–99)
HBA1C MFR BLD: 5.5 % (ref 4.8–5.6)
HCT VFR BLD AUTO: 40.7 % (ref 34–46.6)
HDLC SERPL-MCNC: 69 MG/DL (ref 40–60)
HGB BLD-MCNC: 13.7 G/DL (ref 12–15.9)
IMM GRANULOCYTES # BLD AUTO: 0.01 10*3/MM3 (ref 0–0.05)
IMM GRANULOCYTES NFR BLD AUTO: 0.2 % (ref 0–0.5)
IRON 24H UR-MRATE: 108 MCG/DL (ref 37–145)
IRON SATN MFR SERPL: 25 % (ref 20–50)
LDLC SERPL CALC-MCNC: 112 MG/DL (ref 0–100)
LDLC/HDLC SERPL: 1.62 {RATIO}
LYMPHOCYTES # BLD AUTO: 1.98 10*3/MM3 (ref 0.7–3.1)
LYMPHOCYTES NFR BLD AUTO: 33.1 % (ref 19.6–45.3)
MAGNESIUM SERPL-MCNC: 2.2 MG/DL (ref 1.6–2.6)
MCH RBC QN AUTO: 30.6 PG (ref 26.6–33)
MCHC RBC AUTO-ENTMCNC: 33.7 G/DL (ref 31.5–35.7)
MCV RBC AUTO: 90.8 FL (ref 79–97)
METHADONE UR QL SCN: NEGATIVE
MONOCYTES # BLD AUTO: 0.7 10*3/MM3 (ref 0.1–0.9)
MONOCYTES NFR BLD AUTO: 11.7 % (ref 5–12)
NEUTROPHILS NFR BLD AUTO: 3.12 10*3/MM3 (ref 1.7–7)
NEUTROPHILS NFR BLD AUTO: 52 % (ref 42.7–76)
NRBC BLD AUTO-RTO: 0 /100 WBC (ref 0–0.2)
OPIATES UR QL: NEGATIVE
OXYCODONE UR QL SCN: NEGATIVE
PCP UR QL SCN: NEGATIVE
PLATELET # BLD AUTO: 286 10*3/MM3 (ref 140–450)
PMV BLD AUTO: 10.7 FL (ref 6–12)
POTASSIUM SERPL-SCNC: 4.1 MMOL/L (ref 3.5–5.2)
PROT SERPL-MCNC: 7.2 G/DL (ref 6–8.5)
RBC # BLD AUTO: 4.48 10*6/MM3 (ref 3.77–5.28)
SODIUM SERPL-SCNC: 140 MMOL/L (ref 136–145)
TIBC SERPL-MCNC: 440 MCG/DL (ref 298–536)
TRANSFERRIN SERPL-MCNC: 295 MG/DL (ref 200–360)
TRICYCLICS UR QL SCN: NEGATIVE
TRIGL SERPL-MCNC: 46 MG/DL (ref 0–150)
TSH SERPL DL<=0.05 MIU/L-ACNC: 1.18 UIU/ML (ref 0.27–4.2)
VIT B12 BLD-MCNC: 609 PG/ML (ref 211–946)
VLDLC SERPL-MCNC: 9 MG/DL (ref 5–40)
WBC NRBC COR # BLD AUTO: 5.99 10*3/MM3 (ref 3.4–10.8)

## 2024-05-01 PROCEDURE — 99213 OFFICE O/P EST LOW 20 MIN: CPT | Performed by: NURSE PRACTITIONER

## 2024-05-01 PROCEDURE — 82746 ASSAY OF FOLIC ACID SERUM: CPT

## 2024-05-01 PROCEDURE — 80053 COMPREHEN METABOLIC PANEL: CPT

## 2024-05-01 PROCEDURE — 83540 ASSAY OF IRON: CPT

## 2024-05-01 PROCEDURE — 83036 HEMOGLOBIN GLYCOSYLATED A1C: CPT

## 2024-05-01 PROCEDURE — 84466 ASSAY OF TRANSFERRIN: CPT

## 2024-05-01 PROCEDURE — 99396 PREV VISIT EST AGE 40-64: CPT | Performed by: NURSE PRACTITIONER

## 2024-05-01 PROCEDURE — 36415 COLL VENOUS BLD VENIPUNCTURE: CPT

## 2024-05-01 PROCEDURE — 80307 DRUG TEST PRSMV CHEM ANLYZR: CPT

## 2024-05-01 PROCEDURE — 84443 ASSAY THYROID STIM HORMONE: CPT

## 2024-05-01 PROCEDURE — 82607 VITAMIN B-12: CPT

## 2024-05-01 PROCEDURE — 82306 VITAMIN D 25 HYDROXY: CPT

## 2024-05-01 PROCEDURE — 85025 COMPLETE CBC W/AUTO DIFF WBC: CPT

## 2024-05-01 PROCEDURE — 80061 LIPID PANEL: CPT

## 2024-05-01 PROCEDURE — 83735 ASSAY OF MAGNESIUM: CPT

## 2024-05-01 PROCEDURE — 86038 ANTINUCLEAR ANTIBODIES: CPT

## 2024-05-01 RX ORDER — INDOMETHACIN 50 MG/1
50 CAPSULE ORAL 3 TIMES DAILY PRN
Qty: 30 CAPSULE | Refills: 0 | Status: SHIPPED | OUTPATIENT
Start: 2024-05-01

## 2024-05-01 RX ORDER — ESTRADIOL 1 MG/1
1 TABLET ORAL DAILY
Qty: 30 TABLET | Refills: 2 | Status: SHIPPED | OUTPATIENT
Start: 2024-05-01

## 2024-05-02 LAB — ANA SER QL: NEGATIVE

## 2024-05-31 ENCOUNTER — HOSPITAL ENCOUNTER (OUTPATIENT)
Dept: MAMMOGRAPHY | Facility: HOSPITAL | Age: 39
Discharge: HOME | End: 2024-05-31
Admitting: NURSE PRACTITIONER
Payer: COMMERCIAL

## 2024-05-31 DIAGNOSIS — Z12.31 ENCOUNTER FOR SCREENING MAMMOGRAM FOR MALIGNANT NEOPLASM OF BREAST: ICD-10-CM

## 2024-05-31 PROCEDURE — 77063 BREAST TOMOSYNTHESIS BI: CPT

## 2024-05-31 PROCEDURE — 77067 SCR MAMMO BI INCL CAD: CPT

## 2024-06-04 DIAGNOSIS — F90.2 ATTENTION DEFICIT HYPERACTIVITY DISORDER, COMBINED TYPE: Chronic | ICD-10-CM

## 2024-06-04 RX ORDER — DEXTROAMPHETAMINE SACCHARATE, AMPHETAMINE ASPARTATE, DEXTROAMPHETAMINE SULFATE AND AMPHETAMINE SULFATE 2.5; 2.5; 2.5; 2.5 MG/1; MG/1; MG/1; MG/1
TABLET ORAL
Qty: 30 TABLET | Refills: 0 | Status: SHIPPED | OUTPATIENT
Start: 2024-06-04

## 2024-06-04 RX ORDER — DEXTROAMPHETAMINE SACCHARATE, AMPHETAMINE ASPARTATE MONOHYDRATE, DEXTROAMPHETAMINE SULFATE AND AMPHETAMINE SULFATE 6.25; 6.25; 6.25; 6.25 MG/1; MG/1; MG/1; MG/1
25 CAPSULE, EXTENDED RELEASE ORAL EVERY MORNING
Qty: 30 CAPSULE | Refills: 0 | Status: SHIPPED | OUTPATIENT
Start: 2024-06-04

## 2024-06-26 ENCOUNTER — TELEPHONE (OUTPATIENT)
Dept: FAMILY MEDICINE CLINIC | Facility: CLINIC | Age: 39
End: 2024-06-26
Payer: COMMERCIAL

## 2024-06-28 ENCOUNTER — OFFICE VISIT (OUTPATIENT)
Dept: FAMILY MEDICINE CLINIC | Facility: CLINIC | Age: 39
End: 2024-06-28
Payer: COMMERCIAL

## 2024-06-28 VITALS
OXYGEN SATURATION: 98 % | DIASTOLIC BLOOD PRESSURE: 80 MMHG | TEMPERATURE: 98.6 F | SYSTOLIC BLOOD PRESSURE: 120 MMHG | HEIGHT: 65 IN | WEIGHT: 173.7 LBS | HEART RATE: 90 BPM | BODY MASS INDEX: 28.94 KG/M2

## 2024-06-28 DIAGNOSIS — R20.2 NUMBNESS AND TINGLING OF BOTH FEET: Primary | ICD-10-CM

## 2024-06-28 DIAGNOSIS — G89.29 CHRONIC LOW BACK PAIN, UNSPECIFIED BACK PAIN LATERALITY, UNSPECIFIED WHETHER SCIATICA PRESENT: ICD-10-CM

## 2024-06-28 DIAGNOSIS — R20.0 NUMBNESS AND TINGLING OF BOTH FEET: Primary | ICD-10-CM

## 2024-06-28 DIAGNOSIS — M54.50 CHRONIC LOW BACK PAIN, UNSPECIFIED BACK PAIN LATERALITY, UNSPECIFIED WHETHER SCIATICA PRESENT: ICD-10-CM

## 2024-06-28 PROCEDURE — 99214 OFFICE O/P EST MOD 30 MIN: CPT | Performed by: STUDENT IN AN ORGANIZED HEALTH CARE EDUCATION/TRAINING PROGRAM

## 2024-06-28 RX ORDER — MELOXICAM 7.5 MG/1
7.5 TABLET ORAL DAILY PRN
Qty: 30 TABLET | Refills: 0 | Status: SHIPPED | OUTPATIENT
Start: 2024-06-28

## 2024-06-28 RX ORDER — PREDNISONE 20 MG/1
40 TABLET ORAL DAILY
Qty: 10 TABLET | Refills: 0 | Status: SHIPPED | OUTPATIENT
Start: 2024-06-28

## 2024-06-28 NOTE — PROGRESS NOTES
"Chief Complaint  Establish Care and Back Pain (X 1 year. Numbness burning tingling in feet x 3 weeks. Radiating pain X 3 weeks)    History of Present Illness      Back pain  The patient reports back pain with tingling burning in her feet. She points to pain left of lumbar spine. She feels pressure in the area sometimes on both sides. It is chronic. There is shooting pain down the left leg. Mri was declined until she completes 6 weeks of pt. Back pain comes and goes but came yesterday.         The following portions of the patient's history were reviewed and updated as appropriate: allergies, current medications, past family history, past medical history, past social history, past surgical history, and problem list.    OBJECTIVE:  /80   Pulse 90   Temp 98.6 °F (37 °C) (Temporal)   Ht 165.1 cm (65\")   Wt 78.8 kg (173 lb 11.2 oz)   SpO2 98%   BMI 28.91 kg/m²       Physical Exam  Musculoskeletal:      Comments: Muscle strength and sensations intact of lower extremities  No pain on palpation of low back                     Assessment and Plan   Diagnoses and all orders for this visit:    1. Numbness and tingling of both feet (Primary)  -     EMG & Nerve Conduction Test; Future    2. Chronic low back pain, unspecified back pain laterality, unspecified whether sciatica present  -     Aldolase; Future  -     Anti-DNA  Antibody, Double-stranded; Future  -     Cyclic Citrul Peptide Antibody, IgG / IgA; Future  -     Sedimentation rate, automated; Future  -     C-reactive protein; Future  -     HLA-B27 Antigen; Future  -     XR Spine Lumbar 2 or 3 View (In Office)  -     Ambulatory Referral to Pain Management    Other orders  -     meloxicam (Mobic) 7.5 MG tablet; Take 1 tablet by mouth Daily As Needed for Moderate Pain.  Dispense: 30 tablet; Refill: 0  -     predniSONE (DELTASONE) 20 MG tablet; Take 2 tablets by mouth Daily.  Dispense: 10 tablet; Refill: 0        Switch to meloxicam for a safer nsaid. Recheck " lumbar spine imaging. Advised her to call pt and start back with pt to complete her 6 sessions so we can get the mri. Recheck xray.     Counseled on risk of kidney disease, cardiovascular disease, bleeding, stomach issues/ulcers on NSAID so we will try to take long term.     We will give prednisone for the next 5 days . Counseled on risks of anxiety or insomnia.   She will seek care for any saddle anesthesia or loss of bowel/bladder.    Advised to not take meloxicam with other nsaids.         Hormone replacement therapy  Counseled on risks of hrt such as cardiovascular disease, blood clotting, breast cancer risk. She has f/u mammogram scheduled.         Return in about 2 months (around 8/28/2024).       Maxine Cole D.O.  [facility]

## 2024-07-01 ENCOUNTER — LAB (OUTPATIENT)
Dept: LAB | Facility: HOSPITAL | Age: 39
End: 2024-07-01
Payer: COMMERCIAL

## 2024-07-01 DIAGNOSIS — G89.29 CHRONIC LOW BACK PAIN, UNSPECIFIED BACK PAIN LATERALITY, UNSPECIFIED WHETHER SCIATICA PRESENT: ICD-10-CM

## 2024-07-01 DIAGNOSIS — M54.50 CHRONIC LOW BACK PAIN, UNSPECIFIED BACK PAIN LATERALITY, UNSPECIFIED WHETHER SCIATICA PRESENT: ICD-10-CM

## 2024-07-01 LAB
CRP SERPL-MCNC: 0.36 MG/DL (ref 0–0.5)
ERYTHROCYTE [SEDIMENTATION RATE] IN BLOOD: 22 MM/HR (ref 0–20)

## 2024-07-01 PROCEDURE — 86225 DNA ANTIBODY NATIVE: CPT

## 2024-07-01 PROCEDURE — 81374 HLA I TYPING 1 ANTIGEN LR: CPT

## 2024-07-01 PROCEDURE — 36415 COLL VENOUS BLD VENIPUNCTURE: CPT

## 2024-07-01 PROCEDURE — 86140 C-REACTIVE PROTEIN: CPT

## 2024-07-01 PROCEDURE — 86200 CCP ANTIBODY: CPT

## 2024-07-01 PROCEDURE — 85652 RBC SED RATE AUTOMATED: CPT

## 2024-07-01 PROCEDURE — 82085 ASSAY OF ALDOLASE: CPT

## 2024-07-02 ENCOUNTER — TELEPHONE (OUTPATIENT)
Dept: FAMILY MEDICINE CLINIC | Facility: CLINIC | Age: 39
End: 2024-07-02

## 2024-07-02 DIAGNOSIS — M19.90 ARTHRITIS: Primary | ICD-10-CM

## 2024-07-02 DIAGNOSIS — R70.0 ELEVATED SED RATE: ICD-10-CM

## 2024-07-02 LAB
ALDOLASE SERPL-CCNC: 8.3 U/L (ref 3.3–10.3)
CCP IGA+IGG SERPL IA-ACNC: 4 UNITS (ref 0–19)
DSDNA AB SER-ACNC: 1 IU/ML (ref 0–9)

## 2024-07-02 NOTE — TELEPHONE ENCOUNTER
Caller: Honey Thomas    Relationship: Self    Best call back number: [phone]    What is the medical concern/diagnosis: LAB RESULTS    What specialty or service is being requested: RHEUMATOLOGY    What is the provider, practice or medical service name: [facility]    Any additional details: PATIENT IS AGREEABLE AND WOULD LIKE TO GO TO RHEUMATOLOGY PER HER LAB RESULTS

## 2024-07-05 ENCOUNTER — HOSPITAL ENCOUNTER (OUTPATIENT)
Facility: HOSPITAL | Age: 39
Discharge: HOME | End: 2024-07-05
Payer: COMMERCIAL

## 2024-07-05 DIAGNOSIS — R92.8 ABNORMAL MAMMOGRAM: ICD-10-CM

## 2024-07-05 PROCEDURE — 77066 DX MAMMO INCL CAD BI: CPT

## 2024-07-05 PROCEDURE — G0279 TOMOSYNTHESIS, MAMMO: HCPCS

## 2024-07-05 PROCEDURE — 76642 ULTRASOUND BREAST LIMITED: CPT

## 2024-07-10 ENCOUNTER — TELEPHONE (OUTPATIENT)
Dept: PSYCHIATRY | Facility: CLINIC | Age: 39
End: 2024-07-10

## 2024-07-10 NOTE — TELEPHONE ENCOUNTER
Left patient a voicemail and sent [employer] message to let them know provider is out of office and to call back to reschedule.

## 2024-07-11 ENCOUNTER — HOSPITAL ENCOUNTER (OUTPATIENT)
Dept: NEUROLOGY | Facility: HOSPITAL | Age: 39
Discharge: HOME | End: 2024-07-11
Admitting: STUDENT IN AN ORGANIZED HEALTH CARE EDUCATION/TRAINING PROGRAM
Payer: COMMERCIAL

## 2024-07-11 DIAGNOSIS — R20.0 NUMBNESS AND TINGLING OF BOTH FEET: ICD-10-CM

## 2024-07-11 DIAGNOSIS — R20.2 NUMBNESS AND TINGLING OF BOTH FEET: ICD-10-CM

## 2024-07-11 DIAGNOSIS — F90.2 ATTENTION DEFICIT HYPERACTIVITY DISORDER, COMBINED TYPE: Chronic | ICD-10-CM

## 2024-07-11 LAB — HLA-B27 QL NAA+PROBE: POSITIVE

## 2024-07-11 PROCEDURE — 95886 MUSC TEST DONE W/N TEST COMP: CPT

## 2024-07-11 PROCEDURE — 95912 NRV CNDJ TEST 11-12 STUDIES: CPT

## 2024-07-11 RX ORDER — DEXTROAMPHETAMINE SACCHARATE, AMPHETAMINE ASPARTATE, DEXTROAMPHETAMINE SULFATE AND AMPHETAMINE SULFATE 2.5; 2.5; 2.5; 2.5 MG/1; MG/1; MG/1; MG/1
TABLET ORAL
Qty: 30 TABLET | Refills: 0 | Status: SHIPPED | OUTPATIENT
Start: 2024-07-11

## 2024-07-11 RX ORDER — DEXTROAMPHETAMINE SACCHARATE, AMPHETAMINE ASPARTATE MONOHYDRATE, DEXTROAMPHETAMINE SULFATE AND AMPHETAMINE SULFATE 6.25; 6.25; 6.25; 6.25 MG/1; MG/1; MG/1; MG/1
25 CAPSULE, EXTENDED RELEASE ORAL EVERY MORNING
Qty: 30 CAPSULE | Refills: 0 | Status: SHIPPED | OUTPATIENT
Start: 2024-07-11

## 2024-07-12 ENCOUNTER — OFFICE VISIT (OUTPATIENT)
Dept: FAMILY MEDICINE CLINIC | Facility: CLINIC | Age: 39
End: 2024-07-12
Payer: COMMERCIAL

## 2024-07-12 VITALS
TEMPERATURE: 98.6 F | SYSTOLIC BLOOD PRESSURE: 128 MMHG | WEIGHT: 175.2 LBS | BODY MASS INDEX: 29.19 KG/M2 | HEART RATE: 104 BPM | HEIGHT: 65 IN | DIASTOLIC BLOOD PRESSURE: 88 MMHG | OXYGEN SATURATION: 97 % | RESPIRATION RATE: 21 BRPM

## 2024-07-12 DIAGNOSIS — M54.50 CHRONIC LOW BACK PAIN, UNSPECIFIED BACK PAIN LATERALITY, UNSPECIFIED WHETHER SCIATICA PRESENT: Primary | ICD-10-CM

## 2024-07-12 DIAGNOSIS — E66.3 OVERWEIGHT: ICD-10-CM

## 2024-07-12 DIAGNOSIS — G89.29 CHRONIC LOW BACK PAIN, UNSPECIFIED BACK PAIN LATERALITY, UNSPECIFIED WHETHER SCIATICA PRESENT: Primary | ICD-10-CM

## 2024-07-12 PROCEDURE — 99214 OFFICE O/P EST MOD 30 MIN: CPT | Performed by: STUDENT IN AN ORGANIZED HEALTH CARE EDUCATION/TRAINING PROGRAM

## 2024-07-12 RX ORDER — MELOXICAM 7.5 MG/1
TABLET ORAL
Qty: 30 TABLET | Refills: 0 | Status: SHIPPED | OUTPATIENT
Start: 2024-07-12

## 2024-07-12 NOTE — PROGRESS NOTES
"Chief Complaint  Numbness and tingling of both feet (fu)    History of Present Illness      Numbness and tingling in feet  She is unsure if the parasthesia in the feet comes and goes and is only in the toes. She has a very strong family history of reynauds and her symptoms are not constant.     Back pain  The prednisone with meloxicam helped her back pain. Her back pain comes in flares. She is better now, but she is unsure on scale of 1-10 how bad the pain is as she does have chronic pain in the back.   She reports that the back pain came with a fever to 103 in the past but this was not recent . No saddle numbness or loss of bowel bladder. No complaints of urinary symptoms.      No other complaints today.       The following portions of the patient's history were reviewed and updated as appropriate: allergies, current medications, past family history, past medical history, past social history, past surgical history, and problem list.    OBJECTIVE:  /88   Pulse 104   Temp 98.6 °F (37 °C) (Temporal)   Resp 21   Ht 165.1 cm (65\")   Wt 79.5 kg (175 lb 3.2 oz)   SpO2 97%   BMI 29.15 kg/m²       Physical Exam  Constitutional:       General: She is not in acute distress.     Appearance: Normal appearance.   HENT:      Head: Normocephalic and atraumatic.   Eyes:      Extraocular Movements: Extraocular movements intact.   Cardiovascular:      Rate and Rhythm: Normal rate and regular rhythm.      Heart sounds: No murmur heard.  Pulmonary:      Effort: Pulmonary effort is normal. No respiratory distress.      Breath sounds: Normal breath sounds. No stridor. No wheezing, rhonchi or rales.   Skin:     Findings: No rash.   Neurological:      General: No focal deficit present.      Mental Status: She is alert.      Comments: Normal gait.    Psychiatric:         Mood and Affect: Mood normal.                    Assessment and Plan   Diagnoses and all orders for this visit:    1. Chronic low back pain, unspecified back " pain laterality, unspecified whether sciatica present (Primary)  -     Ambulatory Referral to Physical Therapy  -     Ambulatory Referral to Nutrition Services    2. Overweight  -     Ambulatory Referral to Nutrition Services    Other orders  -     meloxicam (Mobic) 7.5 MG tablet; Take 1-2 tablets by mouth daily as needed for pain.  Dispense: 30 tablet; Refill: 0      She will go to rheumatology given hla b27 positive and will go to physical therapy concerning for ankylosing spondylitis.     Continue nsaid. Hand out given.     Recommend weight bearing exercise, 1200 mg calcium daily in diet or in over the counter supplement and 1000 units vitamin d daily.    Continue with conservative measures for what seems to be reynauds. She is not interested to see neurology for it now.     No follow-ups on file.       Maxine Cole D.O.  [facility]

## 2024-07-17 ENCOUNTER — TELEMEDICINE (OUTPATIENT)
Dept: PSYCHIATRY | Facility: CLINIC | Age: 39
End: 2024-07-17
Payer: COMMERCIAL

## 2024-07-17 DIAGNOSIS — F90.2 ATTENTION DEFICIT HYPERACTIVITY DISORDER, COMBINED TYPE: Primary | Chronic | ICD-10-CM

## 2024-07-17 NOTE — PROGRESS NOTES
"This provider is completing this appointment through [facility] (through [facility]), [address], [place], [address] using a secure [employer] Video Visit through [employer]. Patient is being seen remotely via telehealth at their residence in [place], and stated they are in a secure environment for this session. The patient's condition being diagnosed/treated is appropriate for telemedicine. The provider identified herself as well as her credentials.   The patient, and/or patients guardian, consent to be seen remotely, and when consent is given they understand that the consent allows for patient identifiable information to be sent to a third party as needed.   They may refuse to be seen remotely at any time. The electronic data is encrypted and password protected, and the patient and/or guardian has been advised of the potential risks to privacy not withstanding such measures.    You have chosen to receive care through a telehealth visit.  Do you consent to use a video/audio connection for your medical care today? Yes    Patient identifiers utilized: Name and date of birth.        Subjective   Honey Thomas is a 38 y.o. female who presents today for follow up    Chief Complaint:  ADHD and anxiety    Accompanied by: Pt was alone for duration of appointment    History of Present Illness:   Pt was last seen by this APRN on 4/24/24.  Pt states the Lexapro made her feel terrible the first day she took it, so she discontinued it. Pt believes a lot of her anxiety revolved around chronic pain issues. Per pt, she recently switched PCP's and was found to have ankylosing spondylitis. She has an appointment with rheumatology in October. Pt feels her new PCP listened to her and found the issue. Knowing what the problem is and how to treat is has relieved a lot of her anxiety. Pt no longer feels an \"anxious rage\". Pt also recently found out that her 6 YO daughter is in precocious " puberty. Mood has been better now that other medical problems have been addressed. Adderall XR and Adderall IR combination continues to manage pt's ADHD symptoms. Without the medications, she struggles with everyday tasks, which is why it is necessary for a better quality of life. Pt denies having experienced any chest pain/discomfort, shortness of breath, and/or other cardiac symptoms since their last appointment. Sleep is stable now that the Meloxicam is helping with pain. Appetite is stable. The patient denies any new medical problems since last appointment with this facility. The patient reports compliance with current medication regimen. The patient denies any current side effects from their current medication regimen. The patient would like to not adjust or change their medications at this visit. The patient denies any suicidal or homicidal ideations, plans, or intent at today's encounter and is convincing. The patient denies any auditory hallucinations or visual hallucinations. The patient does not endorse any significant symptoms consistent with lulu or psychosis at today's encounter.     *If the patient has any concerns or needs assistance, they may call the [facility] at [phone]*        Prior Psychiatric Medications:  Buspar: pt struggled to remember to take multiple times a day  Zoloft: ineffective  Wellbutrin XL: ineffective  Paxil: ineffective  Strattera: nausea, irritability  Lexapro - took a dose of 5 mg and felt terrible, discontinued        The following portions of the patient's history were reviewed and updated as appropriate: allergies, current medications, past family history, past medical history, past social history, past surgical history and problem list.          Past Medical History:  Past Medical History:   Diagnosis Date    ADHD (attention deficit hyperactivity disorder) 2/2021    Allergic     Anxiety     BPPV (benign paroxysmal positional vertigo)      Carpal tunnel syndrome 2010    bilat    TMJ (dislocation of temporomandibular joint) 02/04/2021    Vitamin D deficiency        Social History:  Social History     Socioeconomic History    Marital status:    Tobacco Use    Smoking status: Former     Current packs/day: 0.00     Average packs/day: 0.5 packs/day for 10.0 years (5.0 ttl pk-yrs)     Types: Cigarettes     Start date: 2004     Quit date: 2014     Years since quitting: 10.5     Passive exposure: Past    Smokeless tobacco: Never   Vaping Use    Vaping status: Never Used   Substance and Sexual Activity    Alcohol use: Yes     Alcohol/week: 1.0 standard drink of alcohol     Comment: 1 or less beers per week    Drug use: No    Sexual activity: Yes     Partners: Male     Birth control/protection: Bilateral salpingectomy , Hysterectomy, Surgical       Family History:  Family History   Problem Relation Age of Onset    Hypertension Mother     Ovarian cancer Mother 37    Hypertension Father     Diabetes Father     Graves' disease Sister     Scoliosis Sister     Sleep apnea Sister     Lung cancer Maternal Grandmother     Alzheimer's disease Paternal Grandmother     Other Paternal Grandfather         accident    Autism Niece     Breast cancer Neg Hx        Past Surgical History:  Past Surgical History:   Procedure Laterality Date    ABDOMINOPLASTY N/A 11/25/2019    Procedure: ABDOMINOPLASTY;  Surgeon: Alphonso Vasquez MD;  Location:  [facility];  Service: Plastics    AUGMENTATION MAMMAPLASTY      BREAST AUGMENTATION Bilateral 11/25/2019    Procedure: BREAST AUGMENTATION WITH IMPLANTS BILATERAL;  Surgeon: Alphonso Vasquez MD;  Location: [facility] OR;  Service: Plastics    BREAST SURGERY  11/25/19    Augmentation    COSMETIC SURGERY  11/25/19    Abdominoplasty; breast augmentation    FRACTURE SURGERY Left 1995    lEFT FOREARM    OOPHORECTOMY      TOTAL LAPAROSCOPIC HYSTERECTOMY SALPINGO OOPHORECTOMY N/A 11/25/2019    Procedure: TOTAL ROBOTIC HYSTERECTOMY WITH  BILATERAL SALPINGO-OOPHORECTOMY;  Surgeon: Summer Dorsey DO;  Location: [facility];  Service: DaVinci    WISDOM TOOTH EXTRACTION         Problem List:  Patient Active Problem List   Diagnosis     (normal spontaneous vaginal delivery)    Vitamin D deficiency    Anxiety    BPPV (benign paroxysmal positional vertigo)    Carpal tunnel syndrome    TMJ (dislocation of temporomandibular joint)    Family history of ovarian cancer    Mold exposure    Elevated liver enzymes    Lateral epicondylitis of right elbow       Allergy:   No Known Allergies     Current Medications:   Current Outpatient Medications   Medication Sig Dispense Refill    amphetamine-dextroamphetamine (Adderall) 10 MG tablet Take 1/2-1 tablet PO every afternoon PRN 30 tablet 0    amphetamine-dextroamphetamine XR (Adderall XR) 25 MG 24 hr capsule Take 1 capsule by mouth Every Morning 30 capsule 0    cyclobenzaprine (FLEXERIL) 10 MG tablet Take 1 tablet by mouth 3 (Three) Times a Day As Needed for Muscle Spasms. 30 tablet 0    estradiol (ESTRACE) 1 MG tablet Take 1 tablet by mouth Daily. 30 tablet 2    hydrOXYzine (ATARAX) 25 MG tablet TAKE 1-2 TABLETS BY MOUTH 3 TIMES DAILY AS NEEDED FOR ANXIETY/SLEEP 90 tablet 0    meloxicam (Mobic) 7.5 MG tablet Take 1-2 tablets by mouth daily as needed for pain. 30 tablet 0    Nitro-Bid 2 % ointment       ondansetron ODT (Zofran ODT) 4 MG disintegrating tablet Place 1 tablet on the tongue Every 8 (Eight) Hours As Needed for Nausea or Vomiting. 20 tablet 0     No current facility-administered medications for this visit.       Review of Symptoms:    Review of Systems   Constitutional:  Positive for fatigue.   Psychiatric/Behavioral:  Positive for decreased concentration. The patient is nervous/anxious.          Physical Exam:   Due to the remote nature of this encounter (virtual encounter), vitals were unable to be obtained.  Height stated at 66 inches.  Weight stated at 175 pounds.         Physical Exam  Neurological:       Mental Status: She is alert.   Psychiatric:         Attention and Perception: Attention and perception normal. She does not perceive auditory or visual hallucinations.         Mood and Affect: Mood and affect normal.         Speech: Speech normal.         Behavior: Behavior normal. Behavior is cooperative.         Thought Content: Thought content normal. Thought content is not paranoid or delusional. Thought content does not include homicidal or suicidal ideation. Thought content does not include homicidal or suicidal plan.         Cognition and Memory: Cognition and memory normal.         Mental Status Exam:   Hygiene:   good  Cooperation:  Cooperative  Eye Contact:  Good  Psychomotor Behavior:  Appropriate  Affect:  Full range  Mood: normal  Speech:  Normal  Thought Process:  Goal directed  Thought Content:  Normal  Suicidal:  None  Homicidal:  None  Hallucinations:  None  Delusion:  None  Memory:  Intact  Orientation:  Person, Place, Time and Situation  Reliability:  good  Insight:  Good  Judgement:  Good  Impulse Control:  Fair        Patient Health Questionnaire-9 (PHQ-9) (Depression Screening Tool)  Little interest or pleasure in doing things? (P) 0-->not at all   Feeling down, depressed, or hopeless? (P) 1-->several days   Trouble falling or staying asleep, or sleeping too much? (P) 1-->several days   Feeling tired or having little energy? (P) 2-->more than half the days   Poor appetite or overeating? (P) 1-->several days   Feeling bad about yourself - or that you are a failure or have let yourself or your family down? (P) 1-->several days   Trouble concentrating on things, such as reading the newspaper or watching television? (P) 2-->more than half the days   Moving or speaking so slowly that other people could have noticed? Or the opposite - being so fidgety or restless that you have been moving around a lot more than usual? (P) 1-->several days   Thoughts that you would be better off dead, or of  hurting yourself in some way? (P) 0-->not at all   PHQ-9 Total Score (P) 9   If you checked off any problems, how difficult have these problems made it for you to do your work, take care of things at home, or get along with other people? (P) somewhat difficult     PHQ-9 Total Score: (P) 9      Generalized Anxiety Disorder 7-Item (DA-7) Screening Tool  Feeling nervous, anxious or on edge: (P) More than half the days  Not being able to stop or control worrying: (P) Several days  Worrying too much about different things: (P) More than half the days  Trouble Relaxing: (P) More than half the days  Being so restless that it is hard to sit still: (P) Several days  Feeling afraid as if something awful might happen: (P) Not at all  Becoming easily annoyed or irritable: (P) More than half the days  DA 7 Total Score: (P) 10  If you checked any problems, how difficult have these problems made it for you to do your work, take care of things at home, or get along with other people: (P) Somewhat difficult    [facility] request number [id number] reviewed by this APRN at today's encounter.    Previous Provider notes and available records reviewed by this APRN at today's encounter.         Lab Results:   Lab on 07/01/2024   Component Date Value Ref Range Status    Aldolase 07/01/2024 8.3  3.3 - 10.3 U/L Final    Anti-DNA (DS) Ab Qn 07/01/2024 1  0 - 9 IU/mL Final                                       Negative      <5                                     Equivocal  5 - 9                                     Positive      >9    CCP Antibodies IgG/IgA 07/01/2024 4  0 - 19 units Final                              Negative               <20                            Weak positive      20 - 39                            Moderate positive  40 - 59                            Strong positive        >59    Sed Rate 07/01/2024 22 (H)  0 - 20 mm/hr Final    C-Reactive Protein 07/01/2024 0.36  0.00 - 0.50 mg/dL Final    HLA B27 07/01/2024 Positive    Final    HLA-B*27 Positive  This patient is positive for an HLA-B*27 allele that is  associated with spondyloarthropathies. This procedure rules  out the B*27:06 and 27:09 alleles, which the literature  suggests are not associated with spondyloarthropathies.  There are several very rare HLA allele that are not ruled  out by this assay that may result in a false positive.  Clinical correlation is recommended.  B27 allele interpretation for all loci based on IMGT/HLA  database version 3.51.0  This test was developed and its performance characteristics  determined by [employer].  It has not been cleared or approved  by the Food and Drug Administration.  [facility] CLIA ID Number [id number]  This test was performed using Polymerase Chain Reaction (PCR) and  Sequence Specific Oligonucleotide Probes (SSOP) technique. Sequence  Based Typing (SBT) may be used as a supplemental method when  necessary.  If you have questions, please call [employer] customer service at  [phone] or email at [email or web address].         Assessment & Plan   Problems Addressed this Visit    None  Visit Diagnoses       Attention deficit hyperactivity disorder, combined type  (Chronic)   -  Primary          Diagnoses         Codes Comments    Attention deficit hyperactivity disorder, combined type    -  Primary ICD-10-CM: F90.2  ICD-9-CM: 314.01             Visit Diagnoses:    ICD-10-CM ICD-9-CM   1. Attention deficit hyperactivity disorder, combined type  F90.2 314.01         GOALS:  Short Term Goals: Patient will be compliant with medication, and patient will have no significant medication related side effects.  Patient will be engaged in psychotherapy as indicated.  Patient will report subjective improvement of symptoms.  Long term goals: To stabilize mood and treat/improve subjective symptoms, the patient will stay out of the hospital, the patient will be at an optimal level of functioning, and the patient will take all medications as prescribed.  The  patient verbalized understanding and agreement with goals that were mutually set.      TREATMENT PLAN: Continue supportive psychotherapy efforts and medications as indicated.  Medication and treatment options, both pharmacological and non-pharmacological treatment options, discussed during today's visit, including any off label use of medication. Patient acknowledged and verbally consented with current treatment plan and was educated on the importance of compliance with treatment and follow-up appointments.      -Continue Adderall XR 25 mg PO QAM for ADHD   -Continue Adderall 5-10 mg PO every afternoon PRN for ADHD   -Continue hydroxyzine 25-50 mg PO TID PRN for anxiety  -Discontinue Lexapro   -The patient has been made aware that the Biden Administration announced on January 30, 2023 that the COVID-19 Public Health Emergency (COVID-19 PHE) ended on May 11, 2023. The patient is aware that during the COVID-19 PHE a portion of the Robbi Hayley Act was waived, allowing controlled substances to be provided via telemedicine without in-person encounters. The patient is aware that with the COVID-19 PHE having ended the Robbi Hayley Act was to go back into full effect, meaning that prescriptions for controlled substances cannot be given via telemedicine without in-person encounters and meeting all requirements of the Robbi Hayley Act. Discussed with the patient that on May 10, 2023, the Drug Enforcement Agency (DAMIEN), jointly with the Substance Abuse and Mental Health Services Administration (SAMHSA), announced that they are issuing a temporary rule to extend certain exceptions granted to existing DAMIEN regulations in March 2020 as a result of the COVID-19 PHE, in order to avoid lapses in care for patients. The full set of telemedicine flexibilities regarding prescription of controlled medications as were in place during the COVID-19 PHE will remain in place through December 31, 2024. The patient understands that they will need  to establish care with a local provider in their area for an in-person evaluation and treatment with any controlled substance prior to this date as the [facility] is strictly a telemedicine clinic and cannot offer the patient an in-person evaluation to be compliant with the Robbi Hayley Act when it goes back into full effect.     Without the prescribed medication for ADHD, it is reported that the patient has problems with attention and focus including being easily distracted, easily losing objects, trouble with time management, trouble completing tasks because of distractions, procrastination, indecisiveness, careless mistakes in daily activities/work and not finishing jobs that are started. Due to the reported problems without the medication usage, as well as the significant improvement in symptoms with the medication usage, the patient requests to remain on the prescribed medication for ADHD. Patients has been educated on the risks involved with stimulants, such as cardiac complications, weight loss, the potential for dependence and tolerance, mood and behavior changes, and decrease in seizure threshold. Patient is also informed that the medication is to be used by the patient only, the medication is to be used only as directed, and the medication should not be combined with other substances unless directed by a Provider/Prescriber. The patient verbalized understanding and agreement with this in their own words, and wish to continue with current treatment plan.      Controlled Substance Medication Contract    Controlled substance medications (i.e. benzodiazepines, opioids, amphetamines) are very useful, but have a high potential for misuse and are, therefore, closely controlled by local, state, and federal government(s). As a patient of [facility], you agree and understand the followin. I am responsible for the controlled substance  "medications prescribed to me. If my prescription is misplaced, stolen, or if \"I run out early,\" I understand this medication will not be replaced regardless of the circumstances.  2. Refills of controlled substance medications: (a) Will be made only during regular office hours Monday-Thursday once a month and during a scheduled virtual appointment. Refills will not be made at night, weekends, or on holidays. (b) Will not be made if \"I lost my prescriptions,\" \"ran out early,\" or \"misplaced my medication\". I am solely responsible for taking the medication as prescribed and for keeping track of the remaining.   3. I agree to comply with urine drug testing and pill counts at the provider's discretion, thereby, documenting the proper use of any medications. If alcohol abuse is suspected, a breathalyzer or blood alcohol level may be ordered. Unannounced urine or serum toxicology specimens may be requested and my cooperation is required.  4. I understand that if I violate any of the above conditions, my prescriptions for controlled medications my be terminated. If the violation involves obtaining these medications from another individual, or the concomitant use of non-prescription illicit (illegal) drugs, I may also be reported to other providers, pharmacies, medical facilities and the appropriate authorities.   5. I further understand that if I violate this controlled substance contract due to non-compliance of medical directions, such as the failure in taking medications as prescribed, utilizing other illicit drugs, or abuse of controlled medications, the prescription for controlled medications may be terminated.   6. I agree to keep my scheduled appointments and conduct myself in a courteous manner.  7. I agree not to sell, share, or give any medication to another person. I understand that such mishandling of my medication is a serious violation of this agreement and would result in my treatment being terminated without " any recourse for appeal.   8. I agree not to take my medication from any physicians, nurse practitioners, pharmacies or other sources without telling my prescriber.  9. I agree to take my medication as my prescriber has instructed and not to alter the way I take my medication without first consulting my prescriber.  10. I agree to abstain from problematic alcohol usage, opioids, marijuana, cocaine, and other addictive substances.   11. If I am legally involved related to legal or illegal drugs, including alcohol, refill of controlled substances will not be given until a re-evaluation of my chemical dependency treatment plan has been completed. Refills are at the discretion of the prescriber.   12. I agree to fill all of my controlled medications at an in-state ([place]) pharmacy.   13. I understand that [facility] utilizes the [place] All Schedule Prescription Electronic Reporting (GO) system and will monitor my prescription history via this source.  14. Benzodiazepines are drugs prescribed to treat conditions like anxiety, insomnia, and seizures. Examples of these drugs include: alprazolam, clonazepam, diazepam, and lorazepam. The FDA has applied a Black Box Warning (one of the strictest warnings) that the use of opioids and benzodiazepines together have serious risks to include unusual dizziness or lightheadedness, extreme sleepiness, slowed or difficult breathing, coma and death. There is an added risk if alcohol is also ingested. It is the policy of [facility] to NOT prescribe benzodiazepines to patients who also use opioids. If a patient already presents already prescribed both, the prescriber my direct the patient to their previous provider who prescribed it or taper the benzodiazepine as part of the treatment plan. These patients must be monitored at appropriate intervals and so visits may be more frequent.     This APRN has discussed and  reviewed this information with the patient and/or guardian. The patient and/or guardian verbally agreed (no signatures are obtained during today's visit as they are a telehealth patient and is unable to print and sign this document, therefore, verbal agreement has been obtained).       MEDICATION ISSUES:  Discussed treatment plan and medication options of prescribed medication as well as the risks, benefits, any black box warnings, and side effects including potential falls, possible impaired driving, and metabolic adversities among others, including any off label use of medication. Patient is agreeable to call the office with any worsening of symptoms or onset of side effects, or if any concerns or questions arise.  The contact information for the office is made available to the patient. Patient is agreeable to call 911 or go to the nearest ER should they begin having any SI/HI, or if any urgent concerns arise.       VERBAL INFORMED CONSENT FOR MEDICATION:  The patient was educated that their proposed/prescribed psychotropic medication(s) has potential risks, side effects, adverse effects, and black box warnings; and these have been discussed with the patient.  The patient has been informed that their treatment and medication dosage is to be individualized, and may even be above or below the recommended range/dosage due to patient individualization and response, but medication is prescribed using a shared decision making approach, and no medication or dosage will be prescribed without the patient's verbal consent.  The reason for the use of the medication including any off label use and alternative modes of treatment other than or in addition to medication has been considered and discussed, the probable consequences of not receiving the proposed treatment have been discussed, and any treatment side effects, black box warnings, and cautions associated with treatment have been discussed with the patient.  The patient  is allowed ample time to openly discuss and ask questions regarding the proposed medication(s) and treatment plan and the patient verbalizes understanding the reasons for the use of the medication, its potential risks and benefits, other alternative treatment(s), and the probable consequences that may occur if the proposed medication is not given.  The patient has been given ample time to ask questions and study the information and find the information to be specific, accurate, and complete.  The patient gives verbal consent for the medication(s) proposed/prescribed, they verbalized understanding that they can refuse and withdraw consent at any time with the assistance of this APRN, and the patient has verbally confirmed that they are aware, and are willing, to take the prescribed medication and follow the treatment plan with the known possible risks, side effect, black box warnings, and any potential medication interactions, and the patient reports they will be worse off without this medication and treatment plan.  The patient is advised to contact this APRN/this office if any questions or concerns arise at any time (at [phone]), or call 911/go to the closest emergency department if needed or outside of office hours.      SUICIDE RISK ASSESSMENT AND SAFETY PLAN: Unalterable demographics and a history of mental health intervention indicate this patient is in a high risk category compared to the general population. At present, the patient denies active SI/HI, intentions, or plans at this time and agrees to seek immediate care should such thoughts develop. The patient verbalizes understanding of how to access emergency care if needed and agrees to do so. Consideration of suicide risk and protective factors such as history, current presentation, individual strengths and weaknesses, psychosocial and environmental stressors and variables, psychiatric illness and symptoms, medical conditions and pain, took place in  this interview. Based on those considerations, the patient is determined: within individual baseline and presenting no imminent risk for suicide or homicide. Other recommendations: The patient does not meet the criteria for inpatient admission and is not a safety risk to self or others at today's visit. Inpatient treatment offers no significant advantages over outpatient treatment for this patient at today's visit.  The patient was given ample time for questions and fully participated in treatment planning.  The patient was encouraged to call the clinic with any questions or concerns.  The patient was informed of access to emergency care. If patient were to develop any significant symptomatology, suicidal ideation, homicidal ideation, any concerns, or feel unsafe at any time they are to call the clinic and if unable to get immediate assistance should immediately call 911 or go to the nearest emergency room.  Patient contracted verbally for the following: If you are experiencing an emotional crisis or have thoughts of harming yourself or others, please go to your nearest local emergency room or call 911. Will continue to re-assess medication response and side effects frequently to establish efficacy and ensure safety. Risks, any black box warnings, side effects, off label usage, and benefits of medication and treatment discussed with patient, along with potential adverse side effects of current and/or newly prescribed medication, alternative treatment options, and OTC medications.  Patient verbalized understanding of potential risks, any off label use of medication, any black box warnings, and any side effects in their own words. The patient verbalized understanding and agreed to comply with the safety plan discussed in their own words.  Patient given the number to the office. Number also discussed of the 24- hour suicide hotline.       MEDS ORDERED DURING VISIT:  No orders of the defined types were placed in this  encounter.      Return in about 12 weeks (around 10/9/2024), or if symptoms worsen or fail to improve, for Recheck.     Treatment plan completed on 1/10/24    Progress toward goal: Not at goal    Functional Status: Mild impairment     Prognosis: Good with Ongoing Treatment         This document has been electronically signed by MICHEL Guerrero  July 17, 2024 17:41 EDT     Some of the data in this electronic note has been brought forward from a previous encounter, any necessary changes have been made, it has been reviewed by this APRN, and it is accurate.    Please note that portions of this note were completed with a voice recognition program. Efforts were made to edit dictation, but occasionally words are mistranscribed.

## 2024-07-29 DIAGNOSIS — Z79.890 HORMONE REPLACEMENT THERAPY: ICD-10-CM

## 2024-07-29 RX ORDER — ESTRADIOL 1 MG/1
1 TABLET ORAL DAILY
Qty: 30 TABLET | Refills: 2 | Status: SHIPPED | OUTPATIENT
Start: 2024-07-29

## 2024-07-29 NOTE — TELEPHONE ENCOUNTER
Rx Refill Note  Requested Prescriptions     Pending Prescriptions Disp Refills    estradiol (ESTRACE) 1 MG tablet 30 tablet 2     Sig: Take 1 tablet by mouth Daily.      Last office visit with prescribing clinician: 7/12/2024   Last telemedicine visit with prescribing clinician: Visit date not found   Next office visit with prescribing clinician: Visit date not found                         Would you like a call back once the refill request has been completed: [] Yes [] No    If the office needs to give you a call back, can they leave a voicemail: [] Yes [] No    Sasha Joe MA  07/29/24, 13:25 EDT

## 2024-07-31 ENCOUNTER — HOSPITAL ENCOUNTER (OUTPATIENT)
Dept: NUTRITION | Facility: HOSPITAL | Age: 39
Setting detail: RECURRING SERIES
Discharge: HOME | End: 2024-07-31

## 2024-07-31 DIAGNOSIS — Z79.890 HORMONE REPLACEMENT THERAPY: ICD-10-CM

## 2024-07-31 RX ORDER — ESTRADIOL 1 MG/1
1 TABLET ORAL DAILY
Qty: 30 TABLET | Refills: 2 | Status: CANCELLED | OUTPATIENT
Start: 2024-07-31

## 2024-07-31 NOTE — CONSULTS
[facility] Nutrition Services          Initial 60 Minute Nutrition Visit    Date: 2024   Patient Name: Honey Thomas  : 1985   MRN: [id number]   Referring Provider: Maxine Cole DO    Reason for Visit: Back pain; weight management  Visit Format: Telehealth (TEAMS)    Nutrition Assessment       Social History:   Social History     Socioeconomic History    Marital status:    Tobacco Use    Smoking status: Former     Current packs/day: 0.00     Average packs/day: 0.5 packs/day for 10.0 years (5.0 ttl pk-yrs)     Types: Cigarettes     Start date:      Quit date:      Years since quitting: 10.5     Passive exposure: Past    Smokeless tobacco: Never   Vaping Use    Vaping status: Never Used   Substance and Sexual Activity    Alcohol use: Yes     Alcohol/week: 1.0 standard drink of alcohol     Comment: 1 or less beers per week    Drug use: No    Sexual activity: Yes     Partners: Male     Birth control/protection: Bilateral salpingectomy , Hysterectomy, Surgical     Active Problem List:   Patient Active Problem List    Diagnosis     Lateral epicondylitis of right elbow [M77.11]     Elevated liver enzymes [R74.8]     Family history of ovarian cancer [Z80.41]     Mold exposure [Z77.120]     TMJ (dislocation of temporomandibular joint) [S03.00XA]     Vitamin D deficiency [E55.9]     Anxiety [F41.9]     BPPV (benign paroxysmal positional vertigo) [H81.10]      (normal spontaneous vaginal delivery) [O80]     Carpal tunnel syndrome [G56.00]       Current Medications:   Current Outpatient Medications:     amphetamine-dextroamphetamine (Adderall) 10 MG tablet, Take 1/2-1 tablet PO every afternoon PRN, Disp: 30 tablet, Rfl: 0    amphetamine-dextroamphetamine XR (Adderall XR) 25 MG 24 hr capsule, Take 1 capsule by mouth Every Morning, Disp: 30 capsule, Rfl: 0    cyclobenzaprine (FLEXERIL) 10 MG tablet, Take 1 tablet by mouth 3 (Three) Times a Day As Needed for Muscle Spasms.,  Disp: 30 tablet, Rfl: 0    estradiol (ESTRACE) 1 MG tablet, Take 1 tablet by mouth Daily., Disp: 30 tablet, Rfl: 2    hydrOXYzine (ATARAX) 25 MG tablet, TAKE 1-2 TABLETS BY MOUTH 3 TIMES DAILY AS NEEDED FOR ANXIETY/SLEEP, Disp: 90 tablet, Rfl: 0    meloxicam (Mobic) 7.5 MG tablet, Take 1-2 tablets by mouth daily as needed for pain., Disp: 30 tablet, Rfl: 0    Nitro-Bid 2 % ointment, , Disp: , Rfl:     ondansetron ODT (Zofran ODT) 4 MG disintegrating tablet, Place 1 tablet on the tongue Every 8 (Eight) Hours As Needed for Nausea or Vomiting., Disp: 20 tablet, Rfl: 0    Labs: none reviewed    Hunger Vital Sign Food Insecurity Assessment:  Within the past 12 months I/we worried whether our food would run out before I/we got money to buy more: No   Within the past 12 months the food I/we bought just didn't last and I/we didn't have money to get more: No   Use of food assistance programs (WIC, food stamps, food bradley) No       Food & Nutrition Related History       Food Allergies: none  Food Intolerances: none  Food Behavior: n/a  Nutrition Impact Symptoms: none  Gastrointestinal conditions that impact intake or food choices: none  Details at home: Honey is  with there children. She does most of the grocery shopping and her  does the majority of the meal prep/cooking. They have utilized a meal prep service for dinners recently but did not have access to it for the past month as the business was closed for a month break. Honey says that will start utilizing the meal prep service again in August. Meals prepared by service and  are balanced per interview/dietary recall and portions appropriate.   Who prepares most meals: /self  Who does grocery shopping: Self  How many meals are purchased from fast food/sit down restaurants per week: occasionally  Difficulty chewin - Normal  Difficulty swallowin - Normal  Diet requirement related to personal preference or cultural belief: in general, a  "\"healthy\" diet    History of eating disorder/disordered eating habits: None  Language/communication details: English  Barriers to learning: No barriers identified at this time    24 Hour Recall:   Time Food/beverages consumed   Dinner Lean protein with vegetable and grain (follows similar to [employer])   Beverages Coffee (~40oz/day)  Water (~24loz/day)  Occ. Alcholic beverage (social)     Additional comments: Honey states that she generally eats only one meal/day between the hours of 9:30-11pm during the weekdays and Saturday. Sunday is her only day 'off' from work. This is not a dieting pattern but rather the result of time management and a busy work schedule. Honey states that once she eats, she is 'done for the day' and has difficulty focusing, loses motivation to complete tasks and has 'brain fog.'     Anthropometrics      Height:   Ht Readings from Last 1 Encounters:   07/12/24 165.1 cm (65\")     Weight:   Wt Readings from Last 3 Encounters:   07/12/24 79.5 kg (175 lb 3.2 oz)   06/28/24 78.8 kg (173 lb 11.2 oz)   05/01/24 80.2 kg (176 lb 12.8 oz)     BMI: 29.15  Weight Change: not discussed    Physical Activity     Barriers to physical activity: back pain; possible ankylosing spondylitis diagnosis    Physical activity comments: not discussed during this appointment    Estimated Needs     Estimated Energy Needs: calculated but not discussed: 1100-1400kcal/day (MSJ x 1.2, -500)    Estimated Protein Needs: 65-80g/day (based on 0.8-1.0g/kg/day)    Estimated Fluid Needs: minimum of 64oz water/day    Discussion / Education      Honey is a 39 year old female referred for back pain and weight management. Her biggest challenge is carving out time and energy to prep balanced meals for the work week with Sunday being her only 'free' day. She has been undergoing diagnostics for ankylosing spondylitis due to chronic low back pain, which has impacted both her personal and professional life due to chronic pain and limited range " of motion. 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 She works 7:30am-7:30pm during the week and often has private practice appointments on Saturdays. She is  with three children. Honey does the majority of the grocery shopping while her  does most of the cooking. They have and will utilize a local meal prep service for dinners. Honey enjoys most foods and is willing to try any combination for meals.     During this appointment, the following was discussed:    Honey reports that she is currently only eating between 9:30-11pm at night due to the fact that she feels sluggish, unmotivated and unable to focus after eating, which is difficult given her demanding work position as an OT. However, she is open to meal prepping one meal and one snack based on a MD style of eating (see #2).   MD/anti-inflammatory diet: focusing on lean proteins, fish, fruits and vegetables and whole grains and reducing added sugars, fried/fast foods and alcohol. Honey states that she is very reactive to foods laden with sugar, saturated fat, and sodium. She feels sluggish and has 'brain fog,' which makes concentrating difficult. She feels much better when she eats a balanced plate based on a MD style of eating.  Incorporating a lean protein, healthy fat and fiber rich carb for lunch and snack during the work week. Ideas included making her own trail mix, having cottage cheese with fresh fruit, yogurt with berries and maryjane seeds, salad in a jar, overnight oats with fruit and nuts/seeds.   Utilizing already purchased food containers to meal prep lunch and snack. Thoroughly discussed the above ideas and how they can be easily and quickly prepared.   Continued use of previously utilized meal prep service for quick and easy balanced dinners.   Potentially cutting back on coffee consumption of ~40oz/day. Honey states that she has been drinking this much coffee for many years. Discussed the  impact on sleep quality. Also has potential for altering efficacy of Adderall.   Increasing water intake throughout the day. Honey states that she brings a water bottle to work but, as she has back to back appointments, she does not like to drink too much as she does not have time to use the bathroom in between appointments. Recommended increasing intake by one water bottle/day with sipping on it throughout the day instead of drinking in one setting.   Alcohol. Honey does not drink on a regular basis but states that she does not always feel great after drinking. Will try to cut out all alcohol for the next 4 weeks to gauge impact on overall sense of well-being.     NAIMA and Honey worked to set several goals. RD provided contact info and encouraged patient to reach out with any additional questions or concerns following the appointment.    Assessment of patient engagement: Engaged; asked questions    Measurement of understanding: Patient able to demonstrate understanding with teach back     Resources Provided: Lunch box; MD; Fiber snacks; Breakfast/snack ideas    Goal (s)      Goal 1: Meal prepping (based on recommendations made during counseling session for balanced plate): lunch and one snack for work week (5 days)     Goal 2: Being more mindful of water intake/day: increasing intake by one water bottle/day.       Plan of Care     PES Statement:   Inadequate oral intake related to skipping meals and not eating balanced meals as evidenced by dietary recall and interview.     Follow Up Visit      Follow Up: Thursday, September 12th at 8:45am via TEAMS    Total of 60 minutes spent with patient on nutrition counseling. Education based on Academy of Nutrition and Dietetics guidelines. Patient was provided with RD's contact information. Thank you for this referral.

## 2024-08-05 DIAGNOSIS — Z79.890 HORMONE REPLACEMENT THERAPY: ICD-10-CM

## 2024-08-06 RX ORDER — MELOXICAM 7.5 MG/1
TABLET ORAL
Qty: 30 TABLET | Refills: 0 | Status: SHIPPED | OUTPATIENT
Start: 2024-08-06

## 2024-08-06 RX ORDER — ESTRADIOL 1 MG/1
1 TABLET ORAL DAILY
Qty: 90 TABLET | Refills: 2 | Status: SHIPPED | OUTPATIENT
Start: 2024-08-06

## 2024-08-27 ENCOUNTER — TELEPHONE (OUTPATIENT)
Dept: FAMILY MEDICINE CLINIC | Facility: CLINIC | Age: 39
End: 2024-08-27

## 2024-08-27 ENCOUNTER — TELEMEDICINE (OUTPATIENT)
Dept: FAMILY MEDICINE CLINIC | Facility: CLINIC | Age: 39
End: 2024-08-27
Payer: COMMERCIAL

## 2024-08-27 DIAGNOSIS — U07.1 COVID-19 VIRUS DETECTED: Primary | ICD-10-CM

## 2024-08-27 DIAGNOSIS — F90.2 ATTENTION DEFICIT HYPERACTIVITY DISORDER, COMBINED TYPE: Chronic | ICD-10-CM

## 2024-08-27 PROCEDURE — 99213 OFFICE O/P EST LOW 20 MIN: CPT | Performed by: STUDENT IN AN ORGANIZED HEALTH CARE EDUCATION/TRAINING PROGRAM

## 2024-08-27 RX ORDER — MELOXICAM 7.5 MG/1
TABLET ORAL
Qty: 30 TABLET | Refills: 0 | Status: SHIPPED | OUTPATIENT
Start: 2024-08-27 | End: 2024-08-27 | Stop reason: SDUPTHER

## 2024-08-27 RX ORDER — DEXTROAMPHETAMINE SACCHARATE, AMPHETAMINE ASPARTATE MONOHYDRATE, DEXTROAMPHETAMINE SULFATE AND AMPHETAMINE SULFATE 6.25; 6.25; 6.25; 6.25 MG/1; MG/1; MG/1; MG/1
25 CAPSULE, EXTENDED RELEASE ORAL EVERY MORNING
Qty: 30 CAPSULE | Refills: 0 | Status: SHIPPED | OUTPATIENT
Start: 2024-08-27

## 2024-08-27 RX ORDER — MELOXICAM 7.5 MG/1
TABLET ORAL
Qty: 60 TABLET | Refills: 2 | Status: SHIPPED | OUTPATIENT
Start: 2024-08-27

## 2024-08-27 RX ORDER — DEXTROAMPHETAMINE SACCHARATE, AMPHETAMINE ASPARTATE, DEXTROAMPHETAMINE SULFATE AND AMPHETAMINE SULFATE 2.5; 2.5; 2.5; 2.5 MG/1; MG/1; MG/1; MG/1
TABLET ORAL
Qty: 30 TABLET | Refills: 0 | Status: SHIPPED | OUTPATIENT
Start: 2024-08-27

## 2024-08-27 NOTE — PROGRESS NOTES
You have chosen to receive care through a telehealth visit.  Do you consent to use a video/audio connection for your medical care today? Yes     Chief Complaint  Honey Thomas is a 39 y.o. female who presents via video-conference for covid.         Pt and provider in [place].       History of Present Illness    She has been having chills and temp to 101.5 today.   She tested positive today.  She has sinus pressure with headache.         The following portions of the patient's history were reviewed and updated as appropriate: allergies, current medications, past family history, past medical history, past social history, past surgical history, and problem list.        Objective  Vital signs available: No      Assessment/Plan   1. COVID-19 virus detected  Discussed with the patient that this med cannot be taken with certain medications such as estrogen. She will hold the estrogen while on the paxlovid.   Discussed with the patient the med can cause a rash and advised the patient to seek care for any rash right away.   Discussed with the patient side effects of paxlovid also include liver injury, nausea, headache, worsening symptoms or rebound covid after finishing the medication.   Advised the patient to isolate for 5 days and then wear a mask for 5 days if symptoms improve.     No follow-ups on file.    Maxine Cole D.O.  [facility] [place]     15 minutes were spent reviewing the patient's questionnaire, formulating a treatment plan, and relaying information to the patient via [employer].

## 2024-08-27 NOTE — TELEPHONE ENCOUNTER
Caller: Honey Thomas    Relationship to patient: Self    Best call back number: [phone]    Date of positive COVID19 test: 8/27/24    Date of possible COVID19 exposure: UNSURE    COVID19 symptoms: FEVER, BODY ACHES, MILD COUGH,    Date of initial quarantine: TODAY    Additional information or concerns: PT ALSO HAS AN AUTO IMMUNE DISEASE AND WANTS TO KNOW WHAT TO DO    What is the patients preferred pharmacy: [facility]

## 2024-09-25 ENCOUNTER — TELEMEDICINE (OUTPATIENT)
Dept: PSYCHIATRY | Facility: CLINIC | Age: 39
End: 2024-09-25
Payer: COMMERCIAL

## 2024-09-25 DIAGNOSIS — F90.2 ATTENTION DEFICIT HYPERACTIVITY DISORDER, COMBINED TYPE: Primary | Chronic | ICD-10-CM

## 2024-09-25 DIAGNOSIS — F41.8 SITUATIONAL ANXIETY: ICD-10-CM

## 2024-09-25 RX ORDER — DEXTROAMPHETAMINE SACCHARATE, AMPHETAMINE ASPARTATE MONOHYDRATE, DEXTROAMPHETAMINE SULFATE AND AMPHETAMINE SULFATE 6.25; 6.25; 6.25; 6.25 MG/1; MG/1; MG/1; MG/1
25 CAPSULE, EXTENDED RELEASE ORAL EVERY MORNING
Qty: 30 CAPSULE | Refills: 0 | Status: SHIPPED | OUTPATIENT
Start: 2024-09-25

## 2024-09-25 RX ORDER — BUSPIRONE HYDROCHLORIDE 10 MG/1
10 TABLET ORAL 2 TIMES DAILY
Qty: 60 TABLET | Refills: 2 | Status: SHIPPED | OUTPATIENT
Start: 2024-09-25

## 2024-09-25 RX ORDER — AMANTADINE HYDROCHLORIDE 100 MG/1
TABLET ORAL
COMMUNITY
Start: 2024-09-09

## 2024-09-25 RX ORDER — DEXTROAMPHETAMINE SACCHARATE, AMPHETAMINE ASPARTATE, DEXTROAMPHETAMINE SULFATE AND AMPHETAMINE SULFATE 2.5; 2.5; 2.5; 2.5 MG/1; MG/1; MG/1; MG/1
TABLET ORAL
Qty: 30 TABLET | Refills: 0 | Status: SHIPPED | OUTPATIENT
Start: 2024-09-25

## 2024-10-17 RX ORDER — MELOXICAM 7.5 MG/1
TABLET ORAL
Qty: 60 TABLET | Refills: 2 | Status: SHIPPED | OUTPATIENT
Start: 2024-10-17

## 2024-10-29 DIAGNOSIS — F90.2 ATTENTION DEFICIT HYPERACTIVITY DISORDER, COMBINED TYPE: Chronic | ICD-10-CM

## 2024-10-29 RX ORDER — DEXTROAMPHETAMINE SACCHARATE, AMPHETAMINE ASPARTATE MONOHYDRATE, DEXTROAMPHETAMINE SULFATE AND AMPHETAMINE SULFATE 6.25; 6.25; 6.25; 6.25 MG/1; MG/1; MG/1; MG/1
25 CAPSULE, EXTENDED RELEASE ORAL EVERY MORNING
Qty: 30 CAPSULE | Refills: 0 | Status: SHIPPED | OUTPATIENT
Start: 2024-10-29

## 2024-10-29 RX ORDER — DEXTROAMPHETAMINE SACCHARATE, AMPHETAMINE ASPARTATE, DEXTROAMPHETAMINE SULFATE AND AMPHETAMINE SULFATE 2.5; 2.5; 2.5; 2.5 MG/1; MG/1; MG/1; MG/1
TABLET ORAL
Qty: 30 TABLET | Refills: 0 | Status: SHIPPED | OUTPATIENT
Start: 2024-10-29

## 2024-11-20 RX ORDER — MELOXICAM 7.5 MG/1
TABLET ORAL
Qty: 60 TABLET | Refills: 2 | Status: SHIPPED | OUTPATIENT
Start: 2024-11-20

## 2024-12-19 ENCOUNTER — TELEMEDICINE (OUTPATIENT)
Dept: PSYCHIATRY | Facility: CLINIC | Age: 39
End: 2024-12-19
Payer: COMMERCIAL

## 2024-12-19 DIAGNOSIS — F90.2 ATTENTION DEFICIT HYPERACTIVITY DISORDER, COMBINED TYPE: Primary | Chronic | ICD-10-CM

## 2024-12-19 DIAGNOSIS — F41.8 SITUATIONAL ANXIETY: ICD-10-CM

## 2024-12-19 RX ORDER — SECUKINUMAB 150 MG/ML
INJECTION SUBCUTANEOUS
COMMUNITY
Start: 2024-12-02

## 2024-12-19 RX ORDER — DEXTROAMPHETAMINE SACCHARATE, AMPHETAMINE ASPARTATE, DEXTROAMPHETAMINE SULFATE AND AMPHETAMINE SULFATE 2.5; 2.5; 2.5; 2.5 MG/1; MG/1; MG/1; MG/1
TABLET ORAL
Qty: 30 TABLET | Refills: 0 | Status: SHIPPED | OUTPATIENT
Start: 2024-12-19

## 2024-12-19 RX ORDER — DEXTROAMPHETAMINE SACCHARATE, AMPHETAMINE ASPARTATE MONOHYDRATE, DEXTROAMPHETAMINE SULFATE AND AMPHETAMINE SULFATE 7.5; 7.5; 7.5; 7.5 MG/1; MG/1; MG/1; MG/1
30 CAPSULE, EXTENDED RELEASE ORAL EVERY MORNING
Qty: 30 CAPSULE | Refills: 0 | Status: SHIPPED | OUTPATIENT
Start: 2024-12-19

## 2024-12-19 NOTE — PROGRESS NOTES
This provider is completing this appointment through [facility] (through [facility]), [address], [facility], [address] using a secure EpicPledgehart Video Visit through [employer]. Patient is being seen remotely via telehealth at their residence in [place], and stated they are in a secure environment for this session. The patient's condition being diagnosed/treated is appropriate for telemedicine. The provider identified herself as well as her credentials. The patient, and/or patients guardian, consent to be seen remotely, and when consent is given they understand that the consent allows for patient identifiable information to be sent to a third party as needed. They may refuse to be seen remotely at any time. The electronic data is encrypted and password protected, and the patient and/or guardian has been advised of the potential risks to privacy not withstanding such measures.    You have chosen to receive care through a telehealth visit.  Do you consent to use a video/audio connection for your medical care today? Yes    Patient identifiers utilized: Name and date of birth.        Subjective   Honey Thomas is a 39 y.o. female who presents today for follow up    Chief Complaint:  ADHD and anxiety    Accompanied by: Pt was alone for duration of appointment    History of Present Illness:   Pt was last seen by this APRN on 9/25/24.  Pt continues to work two jobs. 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 Pt admits that she needs to get better at boundaries. Pt states she still struggles with doing self-care activities. She struggles with being present and enjoying moments. Adderall XR and Adderall IR are managing pt's ADHD symptoms fairly well. However, she continues to frequently struggle. Pt reports her frustration tolerance was much lower without the medications. Pt continues to need the medication for a better quality of  life. Pt denies having experienced any chest pain/discomfort, shortness of breath, and/or other cardiac symptoms since their last appointment. Pt recently started Cosentyx. She continues to have pain related to ankylosing spondylitis. Pt states her mother found an old CT scan of her father's in his MyChart and found that it was suspected years ago that he also has it. Pt reports it was never addressed. Per pt, she is taking her daughter to [facility] for precocious puberty. Pt's daughter is also going to see cardiology due to chronically elevated BP. Pt reports elevated anxiety as a result. Pt has been taking the Buspar PRN and she finds it helpful. Encouraged pt to take the Buspar routinely for best results. Appetite is stable. However, she occasionally gets so busy that she does not eat. Denies depression. Pt finds she sleeps better if she takes the Adderall XR slightly later in the morning, which also pushes back when she takes the Adderall IR. The patient reports compliance with current medication regimen. The patient denies any current side effects from their current medication regimen. The patient rates their anxiety on average the past week at an intermittent 6-8/10 on a 0-10 scale, with 10 being the worst. The patient would like to increase their medications at this visit. The patient denies any suicidal or homicidal ideations, plans, or intent at today's encounter and is convincing. The patient denies any auditory hallucinations or visual hallucinations. The patient does not endorse any significant symptoms consistent with lulu or psychosis at today's encounter.     *If the patient has any concerns or needs assistance, they may call the [facility] at [phone]*        Prior Psychiatric Medications:  Buspar: pt struggled to remember to take multiple times a day  Zoloft: ineffective  Wellbutrin XL: ineffective  Paxil: ineffective  Strattera: nausea,  irritability  Lexapro - took a dose of 5 mg and felt terrible, discontinued      The following portions of the patient's history were reviewed and updated as appropriate: allergies, current medications, past family history, past medical history, past social history, past surgical history and problem list.          Past Medical History:  Past Medical History:   Diagnosis Date    ADHD (attention deficit hyperactivity disorder) 2/2021    Allergic     Anxiety     BPPV (benign paroxysmal positional vertigo)     Carpal tunnel syndrome 2010    bilat    TMJ (dislocation of temporomandibular joint) 02/04/2021    Vitamin D deficiency        Social History:  Social History     Socioeconomic History    Marital status:    Tobacco Use    Smoking status: Former     Current packs/day: 0.00     Average packs/day: 0.5 packs/day for 10.0 years (5.0 ttl pk-yrs)     Types: Cigarettes     Start date: 2004     Quit date: 2014     Years since quitting: 10.9     Passive exposure: Past    Smokeless tobacco: Never   Vaping Use    Vaping status: Never Used   Substance and Sexual Activity    Alcohol use: Yes     Alcohol/week: 1.0 standard drink of alcohol     Comment: 1 or less beers per week    Drug use: No    Sexual activity: Yes     Partners: Male     Birth control/protection: Bilateral salpingectomy , Hysterectomy, Surgical       Family History:  Family History   Problem Relation Age of Onset    Hypertension Mother     Ovarian cancer Mother 37    Hypertension Father     Diabetes Father     Graves' disease Sister     Scoliosis Sister     Sleep apnea Sister     Lung cancer Maternal Grandmother     Alzheimer's disease Paternal Grandmother     Other Paternal Grandfather         accident    Autism Niece     Breast cancer Neg Hx        Past Surgical History:  Past Surgical History:   Procedure Laterality Date    ABDOMINOPLASTY N/A 11/25/2019    Procedure: ABDOMINOPLASTY;  Surgeon: Alphonso Vasquez MD;  Location: Replaced by [facility];  Service:  Plastics    AUGMENTATION MAMMAPLASTY      BREAST AUGMENTATION Bilateral 2019    Procedure: BREAST AUGMENTATION WITH IMPLANTS BILATERAL;  Surgeon: Alphonso Vasquez MD;  Location:  [facility];  Service: Plastics    BREAST SURGERY  19    Augmentation    COSMETIC SURGERY  19    Abdominoplasty; breast augmentation    FRACTURE SURGERY Left 1995    lEFT FOREARM    OOPHORECTOMY      TOTAL LAPAROSCOPIC HYSTERECTOMY SALPINGO OOPHORECTOMY N/A 2019    Procedure: TOTAL ROBOTIC HYSTERECTOMY WITH BILATERAL SALPINGO-OOPHORECTOMY;  Surgeon: Summer Dorsey DO;  Location:  [facility];  Service: DaVinci    WISDOM TOOTH EXTRACTION         Problem List:  Patient Active Problem List   Diagnosis     (normal spontaneous vaginal delivery)    Vitamin D deficiency    Anxiety    BPPV (benign paroxysmal positional vertigo)    Carpal tunnel syndrome    TMJ (dislocation of temporomandibular joint)    Family history of ovarian cancer    Mold exposure    Elevated liver enzymes    Lateral epicondylitis of right elbow    COVID-19 virus detected       Allergy:   No Known Allergies     Current Medications:   Current Outpatient Medications   Medication Sig Dispense Refill    amphetamine-dextroamphetamine (Adderall) 10 MG tablet Take 1/2-1 tablet PO every afternoon PRN 30 tablet 0    Cosentyx Sensoready Pen 150 MG/ML solution auto-injector       amantadine (SYMMETREL) 100 MG tablet       amphetamine-dextroamphetamine XR (Adderall XR) 30 MG 24 hr capsule Take 1 capsule by mouth Every Morning 30 capsule 0    busPIRone (BUSPAR) 10 MG tablet Take 1 tablet by mouth 2 (Two) Times a Day. 60 tablet 2    cyclobenzaprine (FLEXERIL) 10 MG tablet Take 1 tablet by mouth 3 (Three) Times a Day As Needed for Muscle Spasms. 30 tablet 0    estradiol (ESTRACE) 1 MG tablet Take 1 tablet by mouth Daily. 90 tablet 2    hydrOXYzine (ATARAX) 25 MG tablet TAKE 1-2 TABLETS BY MOUTH 3 TIMES DAILY AS NEEDED FOR ANXIETY/SLEEP 90 tablet 0    meloxicam  (Mobic) 7.5 MG tablet Take 1-2 tablets by mouth daily as needed for pain. 60 tablet 2    ondansetron ODT (Zofran ODT) 4 MG disintegrating tablet Place 1 tablet on the tongue Every 8 (Eight) Hours As Needed for Nausea or Vomiting. 20 tablet 0     No current facility-administered medications for this visit.       Review of Symptoms:    Review of Systems   Constitutional:  Positive for fatigue.   Psychiatric/Behavioral:  Positive for decreased concentration and stress. The patient is nervous/anxious.          Physical Exam:   Due to the remote nature of this encounter (virtual encounter), vitals were unable to be obtained.  Height stated at 66 inches.  Weight stated at 175 pounds.         Physical Exam  Neurological:      Mental Status: She is alert.   Psychiatric:         Attention and Perception: Attention and perception normal. She does not perceive auditory or visual hallucinations.         Mood and Affect: Affect normal. Mood is anxious.         Speech: Speech normal.         Behavior: Behavior normal. Behavior is cooperative.         Thought Content: Thought content is not paranoid or delusional. Thought content does not include homicidal or suicidal ideation. Thought content does not include homicidal or suicidal plan.         Cognition and Memory: Cognition and memory normal.         Mental Status Exam:   Hygiene:   good  Cooperation:  Cooperative  Eye Contact:  Good  Psychomotor Behavior:  Appropriate  Affect:  Full range  Mood: anxious  Speech:  Normal  Thought Process:  Linear  Thought Content:  Mood congruent  Suicidal:  None  Homicidal:  None  Hallucinations:  None  Delusion:  None  Memory:  Intact  Orientation:  Person, Place, Time and Situation  Reliability:  good  Insight:  Good  Judgement:  Good  Impulse Control:  Fair        Patient Health Questionnaire-9 (PHQ-9) (Depression Screening Tool)  Little interest or pleasure in doing things? (Patient-Rptd) Not at all   Feeling down, depressed, or hopeless?  (Patient-Rptd) Several days   PHQ-2 Total Score (Patient-Rptd) 1   Trouble falling or staying asleep, or sleeping too much? (Patient-Rptd) Several days   Feeling tired or having little energy? (Patient-Rptd) Several days   Poor appetite or overeating? (Patient-Rptd) Several days   Feeling bad about yourself - or that you are a failure or have let yourself or your family down? (Patient-Rptd) Several days   Trouble concentrating on things, such as reading the newspaper or watching television? (Patient-Rptd) Several days   Moving or speaking so slowly that other people could have noticed? Or the opposite - being so fidgety or restless that you have been moving around a lot more than usual? (Patient-Rptd) Not at all   Thoughts that you would be better off dead, or of hurting yourself in some way? (Patient-Rptd) Not at all   PHQ-9 Total Score (Patient-Rptd) 6   If you checked off any problems, how difficult have these problems made it for you to do your work, take care of things at home, or get along with other people? (Patient-Rptd) Not difficult at all         PHQ-9 Total Score: (Patient-Rptd) 6       Generalized Anxiety Disorder 7-Item (DA-7) Screening Tool  Feeling nervous, anxious or on edge: (Patient-Rptd) More than half the days  Not being able to stop or control worrying: (Patient-Rptd) Several days  Worrying too much about different things: (Patient-Rptd) Several days  Trouble Relaxing: (Patient-Rptd) Nearly every day  Being so restless that it is hard to sit still: (Patient-Rptd) More than half the days  Feeling afraid as if something awful might happen: (Patient-Rptd) Not at all  Becoming easily annoyed or irritable: (Patient-Rptd) Several days  DA 7 Total Score: (Patient-Rptd) 10  If you checked any problems, how difficult have these problems made it for you to do your work, take care of things at home, or get along with other people: (Patient-Rptd) Somewhat difficult    GO request number [id number]  reviewed by this APRN at today's encounter.    Previous Provider notes and available records reviewed by this APRN at today's encounter.         Lab Results:   No visits with results within 1 Month(s) from this visit.   Latest known visit with results is:   Lab on 07/01/2024   Component Date Value Ref Range Status    Aldolase 07/01/2024 8.3  3.3 - 10.3 U/L Final    Anti-DNA (DS) Ab Qn 07/01/2024 1  0 - 9 IU/mL Final                                       Negative      <5                                     Equivocal  5 - 9                                     Positive      >9    CCP Antibodies IgG/IgA 07/01/2024 4  0 - 19 units Final                              Negative               <20                            Weak positive      20 - 39                            Moderate positive  40 - 59                            Strong positive        >59    Sed Rate 07/01/2024 22 (H)  0 - 20 mm/hr Final    C-Reactive Protein 07/01/2024 0.36  0.00 - 0.50 mg/dL Final    HLA B27 07/01/2024 Positive   Final    HLA-B*27 Positive  This patient is positive for an HLA-B*27 allele that is  associated with spondyloarthropathies. This procedure rules  out the B*27:06 and 27:09 alleles, which the literature  suggests are not associated with spondyloarthropathies.  There are several very rare HLA allele that are not ruled  out by this assay that may result in a false positive.  Clinical correlation is recommended.  B27 allele interpretation for all loci based on IMGT/HLA  database version 3.51.0  This test was developed and its performance characteristics  determined by [facility].  It has not been cleared or approved  by the Food and Drug Administration.  [facility] CLIA ID Number [id number]  This test was performed using Polymerase Chain Reaction (PCR) and  Sequence Specific Oligonucleotide Probes (SSOP) technique. Sequence  Based Typing (SBT) may be used as a supplemental method when  necessary.  If you have questions, please call [facility]  customer service at  [phone] or email at [email or web address].         Assessment & Plan   Problems Addressed this Visit    None  Visit Diagnoses       Attention deficit hyperactivity disorder, combined type  (Chronic)   -  Primary    Relevant Medications    amphetamine-dextroamphetamine (Adderall) 10 MG tablet    amphetamine-dextroamphetamine XR (Adderall XR) 30 MG 24 hr capsule    Situational anxiety              Diagnoses         Codes Comments    Attention deficit hyperactivity disorder, combined type    -  Primary ICD-10-CM: F90.2  ICD-9-CM: 314.01     Situational anxiety     ICD-10-CM: F41.8  ICD-9-CM: 300.09             Visit Diagnoses:    ICD-10-CM ICD-9-CM   1. Attention deficit hyperactivity disorder, combined type  F90.2 314.01   2. Situational anxiety  F41.8 300.09         GOALS:  Short Term Goals: Patient will be compliant with medication, and patient will have no significant medication related side effects.  Patient will be engaged in psychotherapy as indicated.  Patient will report subjective improvement of symptoms.  Long term goals: To stabilize mood and treat/improve subjective symptoms, the patient will stay out of the hospital, the patient will be at an optimal level of functioning, and the patient will take all medications as prescribed.  The patient verbalized understanding and agreement with goals that were mutually set.      TREATMENT PLAN: Continue supportive psychotherapy efforts and medications as indicated.  Medication and treatment options, both pharmacological and non-pharmacological treatment options, discussed during today's visit, including any off label use of medication. Patient acknowledged and verbally consented with current treatment plan and was educated on the importance of compliance with treatment and follow-up appointments.      -Increase Adderall XR to 30 mg PO QAM for ADHD   -Continue Adderall 5-10 mg PO every afternoon PRN for ADHD   -Continue hydroxyzine 25-50 mg PO  TID PRN for anxiety  -Continue Buspar 10 mg PO BID for anxiety  -The patient has been made aware that the Biden Administration announced on January 30, 2023 that the COVID-19 Public Health Emergency (COVID-19 PHE) ended on May 11, 2023. The patient is aware that during the COVID-19 PHE a portion of the Robbi Hayley Act was waived, allowing controlled substances to be provided via telemedicine without in-person encounters. The patient is aware that with the COVID-19 PHE having ended the Robbi Hayley Act was to go back into full effect, meaning that prescriptions for controlled substances cannot be given via telemedicine without in-person encounters and meeting all requirements of the Robbi Hayley Act. Discussed with the patient that on May 10, 2023, the Drug Enforcement Agency (DAMIEN), jointly with the Substance Abuse and Mental Health Services Administration (SAMHSA), announced that they are issuing a temporary rule to extend certain exceptions granted to existing DAMIEN regulations in March 2020 as a result of the COVID-19 PHE, in order to avoid lapses in care for patients. The full set of telemedicine flexibilities regarding prescription of controlled medications as were in place during the COVID-19 PHE will remain in place through December 31, 2025. The patient understands that they will need to establish care with a local provider in their area for an in-person evaluation and treatment with any controlled substance prior to this date as the [facility] is strictly a telemedicine clinic and cannot offer the patient an in-person evaluation to be compliant with the Robbi Hayley Act when it goes back into full effect.     Without the prescribed medication for ADHD, it is reported that the patient has problems with attention and focus including being easily distracted, easily losing objects, trouble with time management, trouble completing tasks because of distractions,  "procrastination, indecisiveness, careless mistakes in daily activities/work and not finishing jobs that are started. Due to the reported problems without the medication usage, as well as the significant improvement in symptoms with the medication usage, the patient requests to remain on the prescribed medication for ADHD. Patients has been educated on the risks involved with stimulants, such as cardiac complications, weight loss, the potential for dependence and tolerance, mood and behavior changes, and decrease in seizure threshold. Patient is also informed that the medication is to be used by the patient only, the medication is to be used only as directed, and the medication should not be combined with other substances unless directed by a Provider/Prescriber. The patient verbalized understanding and agreement with this in their own words, and wish to continue with current treatment plan.      Controlled Substance Medication Contract    Controlled substance medications (i.e. benzodiazepines, opioids, amphetamines) are very useful, but have a high potential for misuse and are, therefore, closely controlled by local, state, and federal government(s). As a patient of [facility], you agree and understand the followin. I am responsible for the controlled substance medications prescribed to me. If my prescription is misplaced, stolen, or if \"I run out early,\" I understand this medication will not be replaced regardless of the circumstances.  2. Refills of controlled substance medications: (a) Will be made only during regular office hours Monday-Thursday once a month and during a scheduled virtual appointment. Refills will not be made at night, weekends, or on holidays. (b) Will not be made if \"I lost my prescriptions,\" \"ran out early,\" or \"misplaced my medication\". I am solely responsible for taking the medication as prescribed and for keeping track of the remaining.   3. I agree to " comply with urine drug testing and pill counts at the provider's discretion, thereby, documenting the proper use of any medications. If alcohol abuse is suspected, a breathalyzer or blood alcohol level may be ordered. Unannounced urine or serum toxicology specimens may be requested and my cooperation is required.  4. I understand that if I violate any of the above conditions, my prescriptions for controlled medications my be terminated. If the violation involves obtaining these medications from another individual, or the concomitant use of non-prescription illicit (illegal) drugs, I may also be reported to other providers, pharmacies, medical facilities and the appropriate authorities.   5. I further understand that if I violate this controlled substance contract due to non-compliance of medical directions, such as the failure in taking medications as prescribed, utilizing other illicit drugs, or abuse of controlled medications, the prescription for controlled medications may be terminated.   6. I agree to keep my scheduled appointments and conduct myself in a courteous manner.  7. I agree not to sell, share, or give any medication to another person. I understand that such mishandling of my medication is a serious violation of this agreement and would result in my treatment being terminated without any recourse for appeal.   8. I agree not to take my medication from any physicians, nurse practitioners, pharmacies or other sources without telling my prescriber.  9. I agree to take my medication as my prescriber has instructed and not to alter the way I take my medication without first consulting my prescriber.  10. I agree to abstain from problematic alcohol usage, opioids, marijuana, cocaine, and other addictive substances.   11. If I am legally involved related to legal or illegal drugs, including alcohol, refill of controlled substances will not be given until a re-evaluation of my chemical dependency treatment  plan has been completed. Refills are at the discretion of the prescriber.   12. I agree to fill all of my controlled medications at an in-state ([place]) pharmacy.   13. I understand that [facility] utilizes the [place] All Schedule Prescription Electronic Reporting (GO) system and will monitor my prescription history via this source.  14. Benzodiazepines are drugs prescribed to treat conditions like anxiety, insomnia, and seizures. Examples of these drugs include: alprazolam, clonazepam, diazepam, and lorazepam. The FDA has applied a Black Box Warning (one of the strictest warnings) that the use of opioids and benzodiazepines together have serious risks to include unusual dizziness or lightheadedness, extreme sleepiness, slowed or difficult breathing, coma and death. There is an added risk if alcohol is also ingested. It is the policy of [facility] to NOT prescribe benzodiazepines to patients who also use opioids. If a patient already presents already prescribed both, the prescriber my direct the patient to their previous provider who prescribed it or taper the benzodiazepine as part of the treatment plan. These patients must be monitored at appropriate intervals and so visits may be more frequent.     This APRN has discussed and reviewed this information with the patient and/or guardian. The patient and/or guardian verbally agreed (no signatures are obtained during today's visit as they are a telehealth patient and is unable to print and sign this document, therefore, verbal agreement has been obtained).       MEDICATION ISSUES:  Discussed treatment plan and medication options of prescribed medication as well as the risks, benefits, any black box warnings, and side effects including potential falls, possible impaired driving, and metabolic adversities among others, including any off label use of medication. Patient is agreeable to call the office  with any worsening of symptoms or onset of side effects, or if any concerns or questions arise.  The contact information for the office is made available to the patient. Patient is agreeable to call 911 or go to the nearest ER should they begin having any SI/HI, or if any urgent concerns arise.       VERBAL INFORMED CONSENT FOR MEDICATION:  The patient was educated that their proposed/prescribed psychotropic medication(s) has potential risks, side effects, adverse effects, and black box warnings; and these have been discussed with the patient.  The patient has been informed that their treatment and medication dosage is to be individualized, and may even be above or below the recommended range/dosage due to patient individualization and response, but medication is prescribed using a shared decision making approach, and no medication or dosage will be prescribed without the patient's verbal consent.  The reason for the use of the medication including any off label use and alternative modes of treatment other than or in addition to medication has been considered and discussed, the probable consequences of not receiving the proposed treatment have been discussed, and any treatment side effects, black box warnings, and cautions associated with treatment have been discussed with the patient.  The patient is allowed ample time to openly discuss and ask questions regarding the proposed medication(s) and treatment plan and the patient verbalizes understanding the reasons for the use of the medication, its potential risks and benefits, other alternative treatment(s), and the probable consequences that may occur if the proposed medication is not given.  The patient has been given ample time to ask questions and study the information and find the information to be specific, accurate, and complete.  The patient gives verbal consent for the medication(s) proposed/prescribed, they verbalized understanding that they can refuse and  withdraw consent at any time with the assistance of this APRN, and the patient has verbally confirmed that they are aware, and are willing, to take the prescribed medication and follow the treatment plan with the known possible risks, side effect, black box warnings, and any potential medication interactions, and the patient reports they will be worse off without this medication and treatment plan.  The patient is advised to contact this APRN/this office if any questions or concerns arise at any time (at [phone]), or call 911/go to the closest emergency department if needed or outside of office hours.      SUICIDE RISK ASSESSMENT AND SAFETY PLAN: Unalterable demographics and a history of mental health intervention indicate this patient is in a high risk category compared to the general population. At present, the patient denies active SI/HI, intentions, or plans at this time and agrees to seek immediate care should such thoughts develop. The patient verbalizes understanding of how to access emergency care if needed and agrees to do so. Consideration of suicide risk and protective factors such as history, current presentation, individual strengths and weaknesses, psychosocial and environmental stressors and variables, psychiatric illness and symptoms, medical conditions and pain, took place in this interview. Based on those considerations, the patient is determined: within individual baseline and presenting no imminent risk for suicide or homicide. Other recommendations: The patient does not meet the criteria for inpatient admission and is not a safety risk to self or others at today's visit. Inpatient treatment offers no significant advantages over outpatient treatment for this patient at today's visit.  The patient was given ample time for questions and fully participated in treatment planning.  The patient was encouraged to call the clinic with any questions or concerns.  The patient was informed of access to  emergency care. If patient were to develop any significant symptomatology, suicidal ideation, homicidal ideation, any concerns, or feel unsafe at any time they are to call the clinic and if unable to get immediate assistance should immediately call 911 or go to the nearest emergency room.  Patient contracted verbally for the following: If you are experiencing an emotional crisis or have thoughts of harming yourself or others, please go to your nearest local emergency room or call 911. Will continue to re-assess medication response and side effects frequently to establish efficacy and ensure safety. Risks, any black box warnings, side effects, off label usage, and benefits of medication and treatment discussed with patient, along with potential adverse side effects of current and/or newly prescribed medication, alternative treatment options, and OTC medications.  Patient verbalized understanding of potential risks, any off label use of medication, any black box warnings, and any side effects in their own words. The patient verbalized understanding and agreed to comply with the safety plan discussed in their own words.  Patient given the number to the office. Number also discussed of the 24- hour suicide hotline.       MEDS ORDERED DURING VISIT:  New Medications Ordered This Visit   Medications    amphetamine-dextroamphetamine (Adderall) 10 MG tablet     Sig: Take 1/2-1 tablet PO every afternoon PRN     Dispense:  30 tablet     Refill:  0     Pt is also taking Adderall XR. This is a 30 day prescription    amphetamine-dextroamphetamine XR (Adderall XR) 30 MG 24 hr capsule     Sig: Take 1 capsule by mouth Every Morning     Dispense:  30 capsule     Refill:  0     This is a 30 day supply       Return in about 8 weeks (around 2/13/2025), or if symptoms worsen or fail to improve, for Recheck.     Progress toward goal: Not at goal    Functional Status: Moderate impairment     Prognosis: Good with Ongoing Treatment          This document has been electronically signed by MICHEL Guerrero  December 19, 2024 10:00 EST     Some of the data in this electronic note has been brought forward from a previous encounter, any necessary changes have been made, it has been reviewed by this APRN, and it is accurate.    Please note that portions of this note were completed with a voice recognition program.

## 2025-01-30 DIAGNOSIS — Z79.890 HORMONE REPLACEMENT THERAPY: ICD-10-CM

## 2025-01-30 DIAGNOSIS — F90.2 ATTENTION DEFICIT HYPERACTIVITY DISORDER, COMBINED TYPE: Chronic | ICD-10-CM

## 2025-01-30 RX ORDER — ESTRADIOL 1 MG/1
1 TABLET ORAL DAILY
Qty: 90 TABLET | Refills: 2 | Status: SHIPPED | OUTPATIENT
Start: 2025-01-30

## 2025-01-30 RX ORDER — MELOXICAM 7.5 MG/1
TABLET ORAL
Qty: 60 TABLET | Refills: 2 | Status: SHIPPED | OUTPATIENT
Start: 2025-01-30

## 2025-01-30 RX ORDER — DEXTROAMPHETAMINE SACCHARATE, AMPHETAMINE ASPARTATE MONOHYDRATE, DEXTROAMPHETAMINE SULFATE AND AMPHETAMINE SULFATE 7.5; 7.5; 7.5; 7.5 MG/1; MG/1; MG/1; MG/1
30 CAPSULE, EXTENDED RELEASE ORAL EVERY MORNING
Qty: 30 CAPSULE | Refills: 0 | Status: SHIPPED | OUTPATIENT
Start: 2025-01-30

## 2025-01-30 RX ORDER — DEXTROAMPHETAMINE SACCHARATE, AMPHETAMINE ASPARTATE, DEXTROAMPHETAMINE SULFATE AND AMPHETAMINE SULFATE 2.5; 2.5; 2.5; 2.5 MG/1; MG/1; MG/1; MG/1
TABLET ORAL
Qty: 30 TABLET | Refills: 0 | Status: SHIPPED | OUTPATIENT
Start: 2025-01-30

## 2025-02-13 ENCOUNTER — TELEMEDICINE (OUTPATIENT)
Dept: PSYCHIATRY | Facility: CLINIC | Age: 40
End: 2025-02-13
Payer: COMMERCIAL

## 2025-02-13 DIAGNOSIS — F90.2 ATTENTION DEFICIT HYPERACTIVITY DISORDER, COMBINED TYPE: Primary | Chronic | ICD-10-CM

## 2025-02-13 DIAGNOSIS — F41.8 SITUATIONAL ANXIETY: ICD-10-CM

## 2025-02-13 NOTE — PROGRESS NOTES
This provider is completing this appointment through [facility] (through [facility]), [address], [facility], [address] using a secure [employer] Video Visit through [employer]. Patient is being seen remotely via telehealth sitting in her vehicle in [place], and stated they are in a secure environment for this session. The patient's condition being diagnosed/treated is appropriate for telemedicine. The provider identified herself as well as her credentials.   The patient, and/or patients guardian, consent to be seen remotely, and when consent is given they understand that the consent allows for patient identifiable information to be sent to a third party as needed.   They may refuse to be seen remotely at any time. The electronic data is encrypted and password protected, and the patient and/or guardian has been advised of the potential risks to privacy not withstanding such measures.    You have chosen to receive care through a telehealth visit.  Do you consent to use a video/audio connection for your medical care today? Yes    Patient identifiers utilized: Name and date of birth.        Subjective   Honey Thomas is a 39 y.o. female who presents today for follow up    Chief Complaint:  ADHD and anxiety    Accompanied by: Pt was alone for duration of appointment    History of Present Illness:   Pt was last seen by this APRN on 12/19/24.  Pt reports she has been doing well since her last appointment. Pt states the increase in Adderall XR has been beneficial for the treatment of ADHD. Pt reports it does last longer. 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 Per pt, due to all the NTI days at her regular full-time has put her further behind in her work. Pt does not feel one of the teachers is not implementing the interventions she has set in place. Pt is considering eventually  reducing to part-time with the [employer]. Pt is taking Cosentyx and feels it has changed her life. She is no longer in constant pain and feels as if she is 22 YO again. Pt reports her daughter's puberty blocker for precocious puberty was finally approved by insurance. They are working on emotional regulation. Pt's youngest daughter (6.4 YO) was recently diagnosed with ADHD and started on a low dose of Adderall. Pt reports it has been helpful thus far. Pt states her 18 YO daughter is moving back home to save money. Her daughter's roommates are also engaged to get . Her other two daughters are excited to have her home. Pt is not crazy about her daughter's dog not being 100% housetrained. Pt is averaging 5-7 hours of sleep. Appetite is stable. Pt has been using Buspar PRN and it has been helpful. The patient reports compliance with current medication regimen. The patient denies any current side effects from their current medication regimen. The patient rates their anxiety on average the past week at a 5-8/10 on a 0-10 scale, with 10 being the worst. The patient would like to not adjust or change their medications at this visit. The patient denies any suicidal or homicidal ideations, plans, or intent at today's encounter and is convincing. The patient denies any auditory hallucinations or visual hallucinations. The patient does not endorse any significant symptoms consistent with lulu or psychosis at today's encounter.     *If the patient has any concerns or needs assistance, they may call the [facility] at [phone]*        Prior Psychiatric Medications:  Buspar: pt struggled to remember to take multiple times a day  Zoloft: ineffective  Wellbutrin XL: ineffective  Paxil: ineffective  Strattera: nausea, irritability  Lexapro - took a dose of 5 mg and felt terrible, discontinued      The following portions of the patient's history were reviewed and updated as  appropriate: allergies, current medications, past family history, past medical history, past social history, past surgical history and problem list.          Past Medical History:  Past Medical History:   Diagnosis Date    ADHD (attention deficit hyperactivity disorder) 2021    Allergic     Anxiety     BPPV (benign paroxysmal positional vertigo)     Carpal tunnel syndrome     bilat    TMJ (dislocation of temporomandibular joint) 2021    Vitamin D deficiency        Social History:  Social History     Socioeconomic History    Marital status:    Tobacco Use    Smoking status: Former     Current packs/day: 0.00     Average packs/day: 0.5 packs/day for 10.0 years (5.0 ttl pk-yrs)     Types: Cigarettes     Start date:      Quit date:      Years since quittin.1     Passive exposure: Past    Smokeless tobacco: Never   Vaping Use    Vaping status: Never Used   Substance and Sexual Activity    Alcohol use: Yes     Alcohol/week: 1.0 standard drink of alcohol     Comment: 1 or less beers per week    Drug use: No    Sexual activity: Yes     Partners: Male     Birth control/protection: Bilateral salpingectomy , Hysterectomy, Surgical       Family History:  Family History   Problem Relation Age of Onset    Hypertension Mother     Ovarian cancer Mother 37    Hypertension Father     Diabetes Father     Graves' disease Sister     Scoliosis Sister     Sleep apnea Sister     Lung cancer Maternal Grandmother     Alzheimer's disease Paternal Grandmother     Autism Niece     ADD / ADHD Daughter     Breast cancer Neg Hx        Past Surgical History:  Past Surgical History:   Procedure Laterality Date    ABDOMINOPLASTY N/A 2019    Procedure: ABDOMINOPLASTY;  Surgeon: Alphonso Vasquez MD;  Location: [facility];  Service: Plastics    AUGMENTATION MAMMAPLASTY      BREAST AUGMENTATION Bilateral 2019    Procedure: BREAST AUGMENTATION WITH IMPLANTS BILATERAL;  Surgeon: Alphonso Vasquez MD;   Location:  [facility];  Service: Plastics    BREAST SURGERY  19    Augmentation    COSMETIC SURGERY  19    Abdominoplasty; breast augmentation    FRACTURE SURGERY Left 1995    lEFT FOREARM    OOPHORECTOMY      TOTAL LAPAROSCOPIC HYSTERECTOMY SALPINGO OOPHORECTOMY N/A 2019    Procedure: TOTAL ROBOTIC HYSTERECTOMY WITH BILATERAL SALPINGO-OOPHORECTOMY;  Surgeon: Summer Dorsey DO;  Location:  [facility];  Service: DaVinci    WISDOM TOOTH EXTRACTION         Problem List:  Patient Active Problem List   Diagnosis     (normal spontaneous vaginal delivery)    Vitamin D deficiency    Anxiety    BPPV (benign paroxysmal positional vertigo)    Carpal tunnel syndrome    TMJ (dislocation of temporomandibular joint)    Family history of ovarian cancer    Mold exposure    Elevated liver enzymes    Lateral epicondylitis of right elbow    COVID-19 virus detected       Allergy:   No Known Allergies     Current Medications:   Current Outpatient Medications   Medication Sig Dispense Refill    busPIRone (BUSPAR) 10 MG tablet Take 1 tablet by mouth 2 (Two) Times a Day. (Patient taking differently: Take 1 tablet by mouth 2 (Two) Times a Day As Needed (Anxiety).) 60 tablet 2    amantadine (SYMMETREL) 100 MG tablet       amphetamine-dextroamphetamine (Adderall) 10 MG tablet Take 1/2-1 tablet PO every afternoon PRN 30 tablet 0    amphetamine-dextroamphetamine XR (Adderall XR) 30 MG 24 hr capsule Take 1 capsule by mouth Every Morning 30 capsule 0    Cosentyx Sensoready Pen 150 MG/ML solution auto-injector       cyclobenzaprine (FLEXERIL) 10 MG tablet Take 1 tablet by mouth 3 (Three) Times a Day As Needed for Muscle Spasms. 30 tablet 0    estradiol (ESTRACE) 1 MG tablet Take 1 tablet by mouth Daily. 90 tablet 2    hydrOXYzine (ATARAX) 25 MG tablet TAKE 1-2 TABLETS BY MOUTH 3 TIMES DAILY AS NEEDED FOR ANXIETY/SLEEP 90 tablet 0    meloxicam (Mobic) 7.5 MG tablet Take 1-2 tablets by mouth daily as needed for pain. 60 tablet 2      No current facility-administered medications for this visit.       Review of Symptoms:    Review of Systems   Constitutional: Negative.    Psychiatric/Behavioral:  Positive for stress. The patient is nervous/anxious.          Physical Exam:   Due to the remote nature of this encounter (virtual encounter), vitals were unable to be obtained.  Height stated at 66 inches.  Weight stated at 175 pounds.         Physical Exam  Neurological:      Mental Status: She is alert.   Psychiatric:         Attention and Perception: Attention and perception normal.         Mood and Affect: Mood and affect normal.         Speech: Speech normal.         Behavior: Behavior normal. Behavior is cooperative.         Thought Content: Thought content normal. Thought content is not paranoid or delusional. Thought content does not include homicidal or suicidal ideation. Thought content does not include homicidal or suicidal plan.         Cognition and Memory: Cognition and memory normal.           Mental Status Exam:   Hygiene:   good  Cooperation:  Cooperative  Eye Contact:  Good  Psychomotor Behavior:  Appropriate  Affect:  Full range  Mood: normal  Speech:  Normal  Thought Process:  Goal directed  Thought Content:  Normal  Suicidal:  None  Homicidal:  None  Hallucinations:  None  Delusion:  None  Memory:  Intact  Orientation:  Person, Place, Time and Situation  Reliability:  good  Insight:  Good  Judgement:  Good  Impulse Control:  Fair      Patient Health Questionnaire-9 (PHQ-9) (Depression Screening Tool)  Little interest or pleasure in doing things? (Patient-Rptd) Not at all   Feeling down, depressed, or hopeless? (Patient-Rptd) Not at all   PHQ-2 Total Score (Patient-Rptd) 0   Trouble falling or staying asleep, or sleeping too much? (Patient-Rptd) Several days   Feeling tired or having little energy? (Patient-Rptd) Not at all   Poor appetite or overeating? (Patient-Rptd) Not at all   Feeling bad about yourself - or that you are a  failure or have let yourself or your family down? (Patient-Rptd) Several days   Trouble concentrating on things, such as reading the newspaper or watching television? (Patient-Rptd) Several days   Moving or speaking so slowly that other people could have noticed? Or the opposite - being so fidgety or restless that you have been moving around a lot more than usual? (Patient-Rptd) Several days   Thoughts that you would be better off dead, or of hurting yourself in some way? (Patient-Rptd) Not at all   PHQ-9 Total Score (Patient-Rptd) 4   If you checked off any problems, how difficult have these problems made it for you to do your work, take care of things at home, or get along with other people? (Patient-Rptd) Not difficult at all         PHQ-9 Total Score: (Patient-Rptd) 4       Generalized Anxiety Disorder 7-Item (DA-7) Screening Tool  Feeling nervous, anxious or on edge: (Patient-Rptd) More than half the days  Not being able to stop or control worrying: (Patient-Rptd) Several days  Worrying too much about different things: (Patient-Rptd) Several days  Trouble Relaxing: (Patient-Rptd) Nearly every day  Being so restless that it is hard to sit still: (Patient-Rptd) More than half the days  Feeling afraid as if something awful might happen: (Patient-Rptd) Not at all  Becoming easily annoyed or irritable: (Patient-Rptd) Several days  DA 7 Total Score: (Patient-Rptd) 10  If you checked any problems, how difficult have these problems made it for you to do your work, take care of things at home, or get along with other people: (Patient-Rptd) Somewhat difficult    [facility] request number [id number] reviewed by this APRN at today's encounter.    Previous Provider notes and available records reviewed by this APRN at today's encounter.         Lab Results:   No visits with results within 1 Month(s) from this visit.   Latest known visit with results is:   Lab on 07/01/2024   Component Date Value Ref Range Status    Aldolase  07/01/2024 8.3  3.3 - 10.3 U/L Final    Anti-DNA (DS) Ab Qn 07/01/2024 1  0 - 9 IU/mL Final                                       Negative      <5                                     Equivocal  5 - 9                                     Positive      >9    CCP Antibodies IgG/IgA 07/01/2024 4  0 - 19 units Final                              Negative               <20                            Weak positive      20 - 39                            Moderate positive  40 - 59                            Strong positive        >59    Sed Rate 07/01/2024 22 (H)  0 - 20 mm/hr Final    C-Reactive Protein 07/01/2024 0.36  0.00 - 0.50 mg/dL Final    HLA B27 07/01/2024 Positive   Final    HLA-B*27 Positive  This patient is positive for an HLA-B*27 allele that is  associated with spondyloarthropathies. This procedure rules  out the B*27:06 and 27:09 alleles, which the literature  suggests are not associated with spondyloarthropathies.  There are several very rare HLA allele that are not ruled  out by this assay that may result in a false positive.  Clinical correlation is recommended.  B27 allele interpretation for all loci based on IMGT/HLA  database version 3.51.0  This test was developed and its performance characteristics  determined by [facility].  It has not been cleared or approved  by the Food and Drug Administration.  [facility] CLIA ID Number [id number]  This test was performed using Polymerase Chain Reaction (PCR) and  Sequence Specific Oligonucleotide Probes (SSOP) technique. Sequence  Based Typing (SBT) may be used as a supplemental method when  necessary.  If you have questions, please call [facility] customer service at  [phone] or email at [email or web address].         Assessment & Plan   Problems Addressed this Visit    None  Visit Diagnoses       Attention deficit hyperactivity disorder, combined type  (Chronic)   -  Primary    Situational anxiety              Diagnoses         Codes Comments    Attention deficit  hyperactivity disorder, combined type    -  Primary ICD-10-CM: F90.2  ICD-9-CM: 314.01     Situational anxiety     ICD-10-CM: F41.8  ICD-9-CM: 300.09             Visit Diagnoses:    ICD-10-CM ICD-9-CM   1. Attention deficit hyperactivity disorder, combined type  F90.2 314.01   2. Situational anxiety  F41.8 300.09         GOALS:  Short Term Goals: Patient will be compliant with medication, and patient will have no significant medication related side effects.  Patient will be engaged in psychotherapy as indicated.  Patient will report subjective improvement of symptoms.  Long term goals: To stabilize mood and treat/improve subjective symptoms, the patient will stay out of the hospital, the patient will be at an optimal level of functioning, and the patient will take all medications as prescribed.  The patient verbalized understanding and agreement with goals that were mutually set.      TREATMENT PLAN: Continue supportive psychotherapy efforts and medications as indicated.  Medication and treatment options, both pharmacological and non-pharmacological treatment options, discussed during today's visit, including any off label use of medication. Patient acknowledged and verbally consented with current treatment plan and was educated on the importance of compliance with treatment and follow-up appointments.      -Continue Adderall XR 30 mg PO QAM for ADHD   -Continue Adderall 5-10 mg PO every afternoon PRN for ADHD   -Continue hydroxyzine 25-50 mg PO TID PRN for anxiety  -Change Buspar to 10 mg PO BID PRN for anxiety (use current supply)  -The patient has been made aware that the Biden Administration announced on January 30, 2023 that the COVID-19 Public Health Emergency (COVID-19 PHE) ended on May 11, 2023. The patient is aware that during the COVID-19 PHE a portion of the Robbi Hayley Act was waived, allowing controlled substances to be provided via telemedicine without in-person encounters. The patient is aware that  with the COVID-19 PHE having ended the Robbi Hayley Act was to go back into full effect, meaning that prescriptions for controlled substances cannot be given via telemedicine without in-person encounters and meeting all requirements of the Robbi Hayley Act. Discussed with the patient that on May 10, 2023, the Drug Enforcement Agency (DAMIEN), jointly with the Substance Abuse and Mental Health Services Administration (SAMHSA), announced that they are issuing a temporary rule to extend certain exceptions granted to existing DAMIEN regulations in March 2020 as a result of the COVID-19 PHE, in order to avoid lapses in care for patients. The full set of telemedicine flexibilities regarding prescription of controlled medications as were in place during the COVID-19 PHE will remain in place through December 31, 2025. The patient understands that they will need to establish care with a local provider in their area for an in-person evaluation and treatment with any controlled substance prior to this date as the [facility] is strictly a telemedicine clinic and cannot offer the patient an in-person evaluation to be compliant with the Robbi Hayley Act when it goes back into full effect.     Without the prescribed medication for ADHD, it is reported that the patient has problems with attention and focus including being easily distracted, easily losing objects, trouble with time management, trouble completing tasks because of distractions, procrastination, indecisiveness, careless mistakes in daily activities/work and not finishing jobs that are started. Due to the reported problems without the medication usage, as well as the significant improvement in symptoms with the medication usage, the patient requests to remain on the prescribed medication for ADHD. Patients has been educated on the risks involved with stimulants, such as cardiac complications, weight loss, the potential for dependence and  "tolerance, mood and behavior changes, and decrease in seizure threshold. Patient is also informed that the medication is to be used by the patient only, the medication is to be used only as directed, and the medication should not be combined with other substances unless directed by a Provider/Prescriber. The patient verbalized understanding and agreement with this in their own words, and wish to continue with current treatment plan.      Controlled Substance Medication Contract    Controlled substance medications (i.e. benzodiazepines, opioids, amphetamines) are very useful, but have a high potential for misuse and are, therefore, closely controlled by local, state, and federal government(s). As a patient of [facility], you agree and understand the followin. I am responsible for the controlled substance medications prescribed to me. If my prescription is misplaced, stolen, or if \"I run out early,\" I understand this medication will not be replaced regardless of the circumstances.  2. Refills of controlled substance medications: (a) Will be made only during regular office hours Monday-Thursday once a month and during a scheduled virtual appointment. Refills will not be made at night, weekends, or on holidays. (b) Will not be made if \"I lost my prescriptions,\" \"ran out early,\" or \"misplaced my medication\". I am solely responsible for taking the medication as prescribed and for keeping track of the remaining.   3. I agree to comply with urine drug testing and pill counts at the provider's discretion, thereby, documenting the proper use of any medications. If alcohol abuse is suspected, a breathalyzer or blood alcohol level may be ordered. Unannounced urine or serum toxicology specimens may be requested and my cooperation is required.  4. I understand that if I violate any of the above conditions, my prescriptions for controlled medications my be terminated. If the violation involves " obtaining these medications from another individual, or the concomitant use of non-prescription illicit (illegal) drugs, I may also be reported to other providers, pharmacies, medical facilities and the appropriate authorities.   5. I further understand that if I violate this controlled substance contract due to non-compliance of medical directions, such as the failure in taking medications as prescribed, utilizing other illicit drugs, or abuse of controlled medications, the prescription for controlled medications may be terminated.   6. I agree to keep my scheduled appointments and conduct myself in a courteous manner.  7. I agree not to sell, share, or give any medication to another person. I understand that such mishandling of my medication is a serious violation of this agreement and would result in my treatment being terminated without any recourse for appeal.   8. I agree not to take my medication from any physicians, nurse practitioners, pharmacies or other sources without telling my prescriber.  9. I agree to take my medication as my prescriber has instructed and not to alter the way I take my medication without first consulting my prescriber.  10. I agree to abstain from problematic alcohol usage, opioids, marijuana, cocaine, and other addictive substances.   11. If I am legally involved related to legal or illegal drugs, including alcohol, refill of controlled substances will not be given until a re-evaluation of my chemical dependency treatment plan has been completed. Refills are at the discretion of the prescriber.   12. I agree to fill all of my controlled medications at an in-state ([place]) pharmacy.   13. I understand that [facility] utilizes the [place] All Schedule Prescription Electronic Reporting (GO) system and will monitor my prescription history via this source.  14. Benzodiazepines are drugs prescribed to treat conditions like anxiety, insomnia, and  seizures. Examples of these drugs include: alprazolam, clonazepam, diazepam, and lorazepam. The FDA has applied a Black Box Warning (one of the strictest warnings) that the use of opioids and benzodiazepines together have serious risks to include unusual dizziness or lightheadedness, extreme sleepiness, slowed or difficult breathing, coma and death. There is an added risk if alcohol is also ingested. It is the policy of [facility] to NOT prescribe benzodiazepines to patients who also use opioids. If a patient already presents already prescribed both, the prescriber my direct the patient to their previous provider who prescribed it or taper the benzodiazepine as part of the treatment plan. These patients must be monitored at appropriate intervals and so visits may be more frequent.     This APRN has discussed and reviewed this information with the patient and/or guardian. The patient and/or guardian verbally agreed (no signatures are obtained during today's visit as they are a telehealth patient and is unable to print and sign this document, therefore, verbal agreement has been obtained).       MEDICATION ISSUES:  Discussed treatment plan and medication options of prescribed medication as well as the risks, benefits, any black box warnings, and side effects including potential falls, possible impaired driving, and metabolic adversities among others, including any off label use of medication. Patient is agreeable to call the office with any worsening of symptoms or onset of side effects, or if any concerns or questions arise.  The contact information for the office is made available to the patient. Patient is agreeable to call 911 or go to the nearest ER should they begin having any SI/HI, or if any urgent concerns arise.       VERBAL INFORMED CONSENT FOR MEDICATION:  The patient was educated that their proposed/prescribed psychotropic medication(s) has potential risks, side effects,  adverse effects, and black box warnings; and these have been discussed with the patient.  The patient has been informed that their treatment and medication dosage is to be individualized, and may even be above or below the recommended range/dosage due to patient individualization and response, but medication is prescribed using a shared decision making approach, and no medication or dosage will be prescribed without the patient's verbal consent.  The reason for the use of the medication including any off label use and alternative modes of treatment other than or in addition to medication has been considered and discussed, the probable consequences of not receiving the proposed treatment have been discussed, and any treatment side effects, black box warnings, and cautions associated with treatment have been discussed with the patient.  The patient is allowed ample time to openly discuss and ask questions regarding the proposed medication(s) and treatment plan and the patient verbalizes understanding the reasons for the use of the medication, its potential risks and benefits, other alternative treatment(s), and the probable consequences that may occur if the proposed medication is not given.  The patient has been given ample time to ask questions and study the information and find the information to be specific, accurate, and complete.  The patient gives verbal consent for the medication(s) proposed/prescribed, they verbalized understanding that they can refuse and withdraw consent at any time with the assistance of this APRN, and the patient has verbally confirmed that they are aware, and are willing, to take the prescribed medication and follow the treatment plan with the known possible risks, side effect, black box warnings, and any potential medication interactions, and the patient reports they will be worse off without this medication and treatment plan.  The patient is advised to contact this APRN/this office if  any questions or concerns arise at any time (at [phone]), or call 911/go to the closest emergency department if needed or outside of office hours.      SUICIDE RISK ASSESSMENT AND SAFETY PLAN: Unalterable demographics and a history of mental health intervention indicate this patient is in a high risk category compared to the general population. At present, the patient denies active SI/HI, intentions, or plans at this time and agrees to seek immediate care should such thoughts develop. The patient verbalizes understanding of how to access emergency care if needed and agrees to do so. Consideration of suicide risk and protective factors such as history, current presentation, individual strengths and weaknesses, psychosocial and environmental stressors and variables, psychiatric illness and symptoms, medical conditions and pain, took place in this interview. Based on those considerations, the patient is determined: within individual baseline and presenting no imminent risk for suicide or homicide. Other recommendations: The patient does not meet the criteria for inpatient admission and is not a safety risk to self or others at today's visit. Inpatient treatment offers no significant advantages over outpatient treatment for this patient at today's visit.  The patient was given ample time for questions and fully participated in treatment planning.  The patient was encouraged to call the clinic with any questions or concerns.  The patient was informed of access to emergency care. If patient were to develop any significant symptomatology, suicidal ideation, homicidal ideation, any concerns, or feel unsafe at any time they are to call the clinic and if unable to get immediate assistance should immediately call 911 or go to the nearest emergency room.  Patient contracted verbally for the following: If you are experiencing an emotional crisis or have thoughts of harming yourself or others, please go to your nearest local  emergency room or call 911. Will continue to re-assess medication response and side effects frequently to establish efficacy and ensure safety. Risks, any black box warnings, side effects, off label usage, and benefits of medication and treatment discussed with patient, along with potential adverse side effects of current and/or newly prescribed medication, alternative treatment options, and OTC medications.  Patient verbalized understanding of potential risks, any off label use of medication, any black box warnings, and any side effects in their own words. The patient verbalized understanding and agreed to comply with the safety plan discussed in their own words.  Patient given the number to the office. Number also discussed of the 24- hour suicide hotline.       MEDS ORDERED DURING VISIT:  No orders of the defined types were placed in this encounter.      Return in about 12 weeks (around 5/8/2025), or if symptoms worsen or fail to improve, for Recheck.     Progress toward goal: Not at goal    Functional Status: Mild impairment     Prognosis: Good with Ongoing Treatment         This document has been electronically signed by MICHEL Guerrero  February 13, 2025 09:57 EST     Some of the data in this electronic note has been brought forward from a previous encounter, any necessary changes have been made, it has been reviewed by this APRN, and it is accurate.    Please note that portions of this note were completed with a voice recognition program.

## 2025-03-04 DIAGNOSIS — F90.2 ATTENTION DEFICIT HYPERACTIVITY DISORDER, COMBINED TYPE: Chronic | ICD-10-CM

## 2025-03-04 RX ORDER — DEXTROAMPHETAMINE SACCHARATE, AMPHETAMINE ASPARTATE MONOHYDRATE, DEXTROAMPHETAMINE SULFATE AND AMPHETAMINE SULFATE 7.5; 7.5; 7.5; 7.5 MG/1; MG/1; MG/1; MG/1
30 CAPSULE, EXTENDED RELEASE ORAL EVERY MORNING
Qty: 30 CAPSULE | Refills: 0 | Status: SHIPPED | OUTPATIENT
Start: 2025-03-04

## 2025-03-04 RX ORDER — DEXTROAMPHETAMINE SACCHARATE, AMPHETAMINE ASPARTATE, DEXTROAMPHETAMINE SULFATE AND AMPHETAMINE SULFATE 2.5; 2.5; 2.5; 2.5 MG/1; MG/1; MG/1; MG/1
TABLET ORAL
Qty: 30 TABLET | Refills: 0 | Status: SHIPPED | OUTPATIENT
Start: 2025-03-04

## 2025-03-04 RX ORDER — MELOXICAM 15 MG/1
15 TABLET ORAL DAILY
Qty: 90 TABLET | Refills: 1 | Status: SHIPPED | OUTPATIENT
Start: 2025-03-04

## 2025-03-11 DIAGNOSIS — F41.8 SITUATIONAL ANXIETY: ICD-10-CM

## 2025-03-11 RX ORDER — BUSPIRONE HYDROCHLORIDE 10 MG/1
10 TABLET ORAL 2 TIMES DAILY PRN
Qty: 60 TABLET | Refills: 2 | Status: SHIPPED | OUTPATIENT
Start: 2025-03-11

## 2025-03-25 DIAGNOSIS — K90.0 CELIAC DISEASE: Primary | ICD-10-CM

## 2025-04-04 DIAGNOSIS — F90.2 ATTENTION DEFICIT HYPERACTIVITY DISORDER, COMBINED TYPE: Chronic | ICD-10-CM

## 2025-04-07 RX ORDER — DEXTROAMPHETAMINE SACCHARATE, AMPHETAMINE ASPARTATE, DEXTROAMPHETAMINE SULFATE AND AMPHETAMINE SULFATE 2.5; 2.5; 2.5; 2.5 MG/1; MG/1; MG/1; MG/1
TABLET ORAL
Qty: 30 TABLET | Refills: 0 | Status: SHIPPED | OUTPATIENT
Start: 2025-04-07

## 2025-04-07 RX ORDER — DEXTROAMPHETAMINE SACCHARATE, AMPHETAMINE ASPARTATE MONOHYDRATE, DEXTROAMPHETAMINE SULFATE AND AMPHETAMINE SULFATE 7.5; 7.5; 7.5; 7.5 MG/1; MG/1; MG/1; MG/1
30 CAPSULE, EXTENDED RELEASE ORAL EVERY MORNING
Qty: 30 CAPSULE | Refills: 0 | Status: SHIPPED | OUTPATIENT
Start: 2025-04-07

## 2025-05-07 ENCOUNTER — TELEMEDICINE (OUTPATIENT)
Dept: PSYCHIATRY | Facility: CLINIC | Age: 40
End: 2025-05-07
Payer: COMMERCIAL

## 2025-05-07 DIAGNOSIS — F41.8 SITUATIONAL ANXIETY: ICD-10-CM

## 2025-05-07 DIAGNOSIS — F90.2 ATTENTION DEFICIT HYPERACTIVITY DISORDER, COMBINED TYPE: Primary | Chronic | ICD-10-CM

## 2025-05-07 RX ORDER — DEXTROAMPHETAMINE SACCHARATE, AMPHETAMINE ASPARTATE, DEXTROAMPHETAMINE SULFATE AND AMPHETAMINE SULFATE 2.5; 2.5; 2.5; 2.5 MG/1; MG/1; MG/1; MG/1
TABLET ORAL
Qty: 30 TABLET | Refills: 0 | Status: SHIPPED | OUTPATIENT
Start: 2025-05-07

## 2025-05-07 RX ORDER — DEXTROAMPHETAMINE SACCHARATE, AMPHETAMINE ASPARTATE MONOHYDRATE, DEXTROAMPHETAMINE SULFATE AND AMPHETAMINE SULFATE 7.5; 7.5; 7.5; 7.5 MG/1; MG/1; MG/1; MG/1
30 CAPSULE, EXTENDED RELEASE ORAL EVERY MORNING
Qty: 30 CAPSULE | Refills: 0 | Status: SHIPPED | OUTPATIENT
Start: 2025-05-07

## 2025-05-07 NOTE — PROGRESS NOTES
This provider is completing this appointment through [facility] (through [facility]), [address], [facility] [place], [address] using a secure [employer] Video Visit through [employer]. Patient is being seen remotely via telehealth at their residence in [place], and stated they are in a secure environment for this session. The patient's condition being diagnosed/treated is appropriate for telemedicine. If at any point the patient is no longer appropriate for telemedicine, the patient will be referred to in-person services. The provider identified herself as well as her credentials.   The patient, and/or patients guardian, consent to be seen remotely, and when consent is given they understand that the consent allows for patient identifiable information to be sent to a third party as needed.   They may refuse to be seen remotely at any time. The electronic data is encrypted and password protected, and the patient and/or guardian has been advised of the potential risks to privacy not withstanding such measures.    You have chosen to receive care through a telehealth visit.  Do you consent to use a video/audio connection for your medical care today? Yes    Patient identifiers utilized: Name and date of birth.        Subjective   Honey Thomas is a 39 y.o. female who presents today for follow up    Chief Complaint:  ADHD and anxiety    Accompanied by: Pt was alone for duration of appointment    History of Present Illness:   Pt was last seen by this APRN on 2/13/25.  Pt states she had a rheumatology appointment in March. Although Cosentyx has been been helpful, she continued to have symptoms. Pt was also found to have Celiac HLA-DQ8. Pt reports within several days of going on a gluten-free diet, symptoms began to subside. She has had to restart a gluten diet for testing. She has an EGD and a colonoscopy in May. Pt has been looking at research that shows low dose naltrexone can be  effective for chronic pain and autoimmune diseases. Pt plans to discuss this with her other provider. The combination of Adderall XR and Adderall IR continues to manage pt's ADHD symptoms. She is better able to maintain her attention and concentration making it easier to manage her work and home life. Pt has noticed that on a gluten-free diet, the Adderall XR and Adderall IR is more effective as some of the brain fog was lifted. She also noticed her anxiety had improved with the diet. Pt denies having experienced any chest pain/discomfort, shortness of breath, and/or other cardiac symptoms since their last appointment. Sleep has been fairly stable. She stays up later as a way to decompress and have self-care time. Pt's 6 YO daughter is also dealing with health concerns, such as HTN. Mood has been stable considering recent circumstances with her and her daughter's health.The patient reports compliance with current medication regimen. The patient denies any current side effects from their current medication regimen. The patient would like to not adjust or change their medications at this visit. The patient denies any suicidal or homicidal ideations, plans, or intent at today's encounter and is convincing. The patient denies any auditory hallucinations or visual hallucinations. The patient does not endorse any significant symptoms consistent with lulu or psychosis at today's encounter.     *If the patient has any concerns or needs assistance, they may call the [facility] at [phone]*        Prior Psychiatric Medications:  Buspar: pt struggled to remember to take multiple times a day  Zoloft: ineffective  Wellbutrin XL: ineffective  Paxil: ineffective  Strattera: nausea, irritability  Lexapro - took a dose of 5 mg and felt terrible, discontinued      The following portions of the patient's history were reviewed and updated as appropriate: allergies, current medications, past family  history, past medical history, past social history, past surgical history and problem list.          Past Medical History:  Past Medical History:   Diagnosis Date    ADHD (attention deficit hyperactivity disorder) 2021    Allergic     Anxiety     BPPV (benign paroxysmal positional vertigo)     Carpal tunnel syndrome     bilat    TMJ (dislocation of temporomandibular joint) 2021    Vitamin D deficiency        Social History:  Social History     Socioeconomic History    Marital status:    Tobacco Use    Smoking status: Former     Current packs/day: 0.00     Average packs/day: 0.5 packs/day for 10.0 years (5.0 ttl pk-yrs)     Types: Cigarettes     Start date:      Quit date:      Years since quittin.3     Passive exposure: Past    Smokeless tobacco: Never   Vaping Use    Vaping status: Never Used   Substance and Sexual Activity    Alcohol use: Yes     Alcohol/week: 1.0 standard drink of alcohol     Comment: 1 or less beers per week    Drug use: No    Sexual activity: Yes     Partners: Male     Birth control/protection: Bilateral salpingectomy , Hysterectomy, Surgical       Family History:  Family History   Problem Relation Age of Onset    Hypertension Mother     Ovarian cancer Mother 37    Hypertension Father     Diabetes Father     Graves' disease Sister     Scoliosis Sister     Sleep apnea Sister     Lung cancer Maternal Grandmother     Alzheimer's disease Paternal Grandmother     Autism Niece     ADD / ADHD Daughter     Breast cancer Neg Hx        Past Surgical History:  Past Surgical History:   Procedure Laterality Date    ABDOMINOPLASTY N/A 2019    Procedure: ABDOMINOPLASTY;  Surgeon: Alphonso Vasquez MD;  Location: [facility] OR;  Service: Plastics    AUGMENTATION MAMMAPLASTY      BREAST AUGMENTATION Bilateral 2019    Procedure: BREAST AUGMENTATION WITH IMPLANTS BILATERAL;  Surgeon: Alphonso Vasquez MD;  Location: [facility] OR;  Service: Plastics    BREAST SURGERY   19    Augmentation    COSMETIC SURGERY  19    Abdominoplasty; breast augmentation    FRACTURE SURGERY Left 1995    lEFT FOREARM    OOPHORECTOMY      TOTAL LAPAROSCOPIC HYSTERECTOMY SALPINGO OOPHORECTOMY N/A 2019    Procedure: TOTAL ROBOTIC HYSTERECTOMY WITH BILATERAL SALPINGO-OOPHORECTOMY;  Surgeon: Summer Dorsey DO;  Location: [facility];  Service: DaVinci    WISDOM TOOTH EXTRACTION         Problem List:  Patient Active Problem List   Diagnosis     (normal spontaneous vaginal delivery)    Vitamin D deficiency    Anxiety    BPPV (benign paroxysmal positional vertigo)    Carpal tunnel syndrome    TMJ (dislocation of temporomandibular joint)    Family history of ovarian cancer    Mold exposure    Elevated liver enzymes    Lateral epicondylitis of right elbow    COVID-19 virus detected       Allergy:   No Known Allergies     Current Medications:   Current Outpatient Medications   Medication Sig Dispense Refill    amphetamine-dextroamphetamine (Adderall) 10 MG tablet Take 1/2-1 tablet PO every afternoon PRN 30 tablet 0    amphetamine-dextroamphetamine XR (Adderall XR) 30 MG 24 hr capsule Take 1 capsule by mouth Every Morning 30 capsule 0    amantadine (SYMMETREL) 100 MG tablet       busPIRone (BUSPAR) 10 MG tablet Take 1 tablet by mouth 2 (Two) Times a Day As Needed (Anxiety). 60 tablet 2    Cosentyx Sensoready Pen 150 MG/ML solution auto-injector       cyclobenzaprine (FLEXERIL) 10 MG tablet Take 1 tablet by mouth 3 (Three) Times a Day As Needed for Muscle Spasms. 30 tablet 0    estradiol (ESTRACE) 1 MG tablet Take 1 tablet by mouth Daily. 90 tablet 2    hydrOXYzine (ATARAX) 25 MG tablet TAKE 1-2 TABLETS BY MOUTH 3 TIMES DAILY AS NEEDED FOR ANXIETY/SLEEP 90 tablet 0    meloxicam (MOBIC) 15 MG tablet Take 1 tablet by mouth Daily. 90 tablet 1     No current facility-administered medications for this visit.       Review of Symptoms:    Review of Systems   Constitutional: Negative.     Psychiatric/Behavioral:  Positive for stress. The patient is nervous/anxious.          Physical Exam:   Due to the remote nature of this encounter (virtual encounter), vitals were unable to be obtained.  Height stated at 66 inches.  Weight stated at 175 pounds.         Physical Exam  Neurological:      Mental Status: She is alert.   Psychiatric:         Attention and Perception: Attention and perception normal. She does not perceive auditory or visual hallucinations.         Mood and Affect: Affect normal.         Speech: Speech normal.         Behavior: Behavior normal. Behavior is cooperative.         Thought Content: Thought content is not paranoid or delusional. Thought content does not include homicidal or suicidal ideation. Thought content does not include homicidal or suicidal plan.         Cognition and Memory: Cognition and memory normal.      Comments: Normal with stress           Mental Status Exam:   Hygiene:   good  Cooperation:  Cooperative  Eye Contact:  Good  Psychomotor Behavior:  Appropriate  Affect:  Full range  Mood:  Normal with stress  Speech:  Normal  Thought Process:  Goal directed  Thought Content:  Normal  Suicidal:  None  Homicidal:  None  Hallucinations:  None  Delusion:  None  Memory:  Intact  Orientation:  Person, Place, Time and Situation  Reliability:  good  Insight:  Good  Judgement:  Good  Impulse Control:  Fair        Patient Health Questionnaire-9 (PHQ-9) (Depression Screening Tool)  Little interest or pleasure in doing things? (Patient-Rptd) Not at all   Feeling down, depressed, or hopeless? (Patient-Rptd) Not at all   PHQ-2 Total Score (Patient-Rptd) 0   Trouble falling or staying asleep, or sleeping too much? (Patient-Rptd) Several days   Feeling tired or having little energy? (Patient-Rptd) Several days   Poor appetite or overeating? (Patient-Rptd) Not at all   Feeling bad about yourself - or that you are a failure or have let yourself or your family down? (Patient-Rptd) Not  at all   Trouble concentrating on things, such as reading the newspaper or watching television? (Patient-Rptd) Over half   Moving or speaking so slowly that other people could have noticed? Or the opposite - being so fidgety or restless that you have been moving around a lot more than usual? (Patient-Rptd) Not at all   Thoughts that you would be better off dead, or of hurting yourself in some way? (Patient-Rptd) Not at all   PHQ-9 Total Score (Patient-Rptd) 4   If you checked off any problems, how difficult have these problems made it for you to do your work, take care of things at home, or get along with other people? (Patient-Rptd) Not difficult at all         PHQ-9 Total Score: (Patient-Rptd) 4       Generalized Anxiety Disorder 7-Item (DA-7) Screening Tool  Feeling nervous, anxious or on edge: (Patient-Rptd) More than half the days  Not being able to stop or control worrying: (Patient-Rptd) Several days  Worrying too much about different things: (Patient-Rptd) Several days  Trouble Relaxing: (Patient-Rptd) More than half the days  Being so restless that it is hard to sit still: (Patient-Rptd) More than half the days  Feeling afraid as if something awful might happen: (Patient-Rptd) Not at all  Becoming easily annoyed or irritable: (Patient-Rptd) Several days  DA 7 Total Score: (Patient-Rptd) 9  If you checked any problems, how difficult have these problems made it for you to do your work, take care of things at home, or get along with other people: (Patient-Rptd) Somewhat difficult    [facility] request number [id number] reviewed by this APRN at today's encounter.    Previous Provider notes and available records reviewed by this APRN at today's encounter.         Lab Results:   No visits with results within 1 Month(s) from this visit.   Latest known visit with results is:   Lab on 07/01/2024   Component Date Value Ref Range Status    Aldolase 07/01/2024 8.3  3.3 - 10.3 U/L Final    Anti-DNA (DS) Ab Qn 07/01/2024 1   0 - 9 IU/mL Final                                       Negative      <5                                     Equivocal  5 - 9                                     Positive      >9    CCP Antibodies IgG/IgA 07/01/2024 4  0 - 19 units Final                              Negative               <20                            Weak positive      20 - 39                            Moderate positive  40 - 59                            Strong positive        >59    Sed Rate 07/01/2024 22 (H)  0 - 20 mm/hr Final    C-Reactive Protein 07/01/2024 0.36  0.00 - 0.50 mg/dL Final    HLA B27 07/01/2024 Positive   Final    HLA-B*27 Positive  This patient is positive for an HLA-B*27 allele that is  associated with spondyloarthropathies. This procedure rules  out the B*27:06 and 27:09 alleles, which the literature  suggests are not associated with spondyloarthropathies.  There are several very rare HLA allele that are not ruled  out by this assay that may result in a false positive.  Clinical correlation is recommended.  B27 allele interpretation for all loci based on IMGT/HLA  database version 3.51.0  This test was developed and its performance characteristics  determined by [facility].  It has not been cleared or approved  by the Food and Drug Administration.  [facility] CLIA ID Number [id number]  This test was performed using Polymerase Chain Reaction (PCR) and  Sequence Specific Oligonucleotide Probes (SSOP) technique. Sequence  Based Typing (SBT) may be used as a supplemental method when  necessary.  If you have questions, please call HLA customer service at  [phone] or email at [email or web address].         Assessment & Plan   Problems Addressed this Visit    None  Visit Diagnoses         Attention deficit hyperactivity disorder, combined type  (Chronic)   -  Primary    Relevant Medications    amphetamine-dextroamphetamine XR (Adderall XR) 30 MG 24 hr capsule    amphetamine-dextroamphetamine (Adderall) 10 MG tablet    Other Relevant  Orders    Urine Drug Screen - Urine, Clean Catch      Situational anxiety              Diagnoses         Codes Comments      Attention deficit hyperactivity disorder, combined type    -  Primary ICD-10-CM: F90.2  ICD-9-CM: 314.01       Situational anxiety     ICD-10-CM: F41.8  ICD-9-CM: 300.09             Visit Diagnoses:    ICD-10-CM ICD-9-CM   1. Attention deficit hyperactivity disorder, combined type  F90.2 314.01   2. Situational anxiety  F41.8 300.09         GOALS:  Short Term Goals: Patient will be compliant with medication, and patient will have no significant medication related side effects.  Patient will be engaged in psychotherapy as indicated.  Patient will report subjective improvement of symptoms.  Long term goals: To stabilize mood and treat/improve subjective symptoms, the patient will stay out of the hospital, the patient will be at an optimal level of functioning, and the patient will take all medications as prescribed.  The patient verbalized understanding and agreement with goals that were mutually set.      TREATMENT PLAN: Continue supportive psychotherapy efforts and medications as indicated.  Medication and treatment options, both pharmacological and non-pharmacological treatment options, discussed during today's visit, including any off label use of medication. Patient acknowledged and verbally consented with current treatment plan and was educated on the importance of compliance with treatment and follow-up appointments.      -Continue Adderall XR 30 mg PO QAM for ADHD   -Continue Adderall 5-10 mg PO every afternoon PRN for ADHD   -Continue hydroxyzine 25-50 mg PO TID PRN for anxiety  -Continue Buspar to 10 mg PO BID PRN for anxiety (use current supply)  -Obtain UDS  -The patient has been made aware that the Biden Administration announced on January 30, 2023 that the COVID-19 Public Health Emergency (COVID-19 PHE) ended on May 11, 2023. The patient is aware that during the COVID-19 PHE a  portion of the Robbi Hayley Act was waived, allowing controlled substances to be provided via telemedicine without in-person encounters. The patient is aware that with the COVID-19 PHE having ended the Robbi Hayley Act was to go back into full effect, meaning that prescriptions for controlled substances cannot be given via telemedicine without in-person encounters and meeting all requirements of the Robbi Hayley Act. Discussed with the patient that on May 10, 2023, the Drug Enforcement Agency (DAMIEN), jointly with the Substance Abuse and Mental Health Services Administration (SAMA), announced that they are issuing a temporary rule to extend certain exceptions granted to existing DAMIEN regulations in March 2020 as a result of the COVID-19 [facility], in order to avoid lapses in care for patients. The full set of telemedicine flexibilities regarding prescription of controlled medications as were in place during the COVID-19 PHE will remain in place through December 31, 2025. The patient understands that they will need to establish care with a local provider in their area for an in-person evaluation and treatment with any controlled substance prior to this date as the [facility] is strictly a telemedicine clinic and cannot offer the patient an in-person evaluation to be compliant with the Robbi Hayley Act when it goes back into full effect.     Without the prescribed medication for ADHD, it is reported that the patient has problems with attention and focus including being easily distracted, easily losing objects, trouble with time management, trouble completing tasks because of distractions, procrastination, indecisiveness, careless mistakes in daily activities/work and not finishing jobs that are started. Due to the reported problems without the medication usage, as well as the significant improvement in symptoms with the medication usage, the patient requests to remain on the prescribed  "medication for ADHD. Patients has been educated on the risks involved with stimulants, such as cardiac complications, weight loss, the potential for dependence and tolerance, mood and behavior changes, and decrease in seizure threshold. Patient is also informed that the medication is to be used by the patient only, the medication is to be used only as directed, and the medication should not be combined with other substances unless directed by a Provider/Prescriber. The patient verbalized understanding and agreement with this in their own words, and wish to continue with current treatment plan.      Controlled Substance Medication Contract    Controlled substance medications (i.e. benzodiazepines, opioids, amphetamines) are very useful, but have a high potential for misuse and are, therefore, closely controlled by local, state, and federal government(s). As a patient of [facility], you agree and understand the followin. I am responsible for the controlled substance medications prescribed to me. If my prescription is misplaced, stolen, or if \"I run out early,\" I understand this medication will not be replaced regardless of the circumstances.  2. Refills of controlled substance medications: (a) Will be made only during regular office hours Monday-Thursday once a month and during a scheduled virtual appointment. Refills will not be made at night, weekends, or on holidays. (b) Will not be made if \"I lost my prescriptions,\" \"ran out early,\" or \"misplaced my medication\". I am solely responsible for taking the medication as prescribed and for keeping track of the remaining.   3. I agree to comply with urine drug testing and pill counts at the provider's discretion, thereby, documenting the proper use of any medications. If alcohol abuse is suspected, a breathalyzer or blood alcohol level may be ordered. Unannounced urine or serum toxicology specimens may be requested and my cooperation " is required.  4. I understand that if I violate any of the above conditions, my prescriptions for controlled medications my be terminated. If the violation involves obtaining these medications from another individual, or the concomitant use of non-prescription illicit (illegal) drugs, I may also be reported to other providers, pharmacies, medical facilities and the appropriate authorities.   5. I further understand that if I violate this controlled substance contract due to non-compliance of medical directions, such as the failure in taking medications as prescribed, utilizing other illicit drugs, or abuse of controlled medications, the prescription for controlled medications may be terminated.   6. I agree to keep my scheduled appointments and conduct myself in a courteous manner.  7. I agree not to sell, share, or give any medication to another person. I understand that such mishandling of my medication is a serious violation of this agreement and would result in my treatment being terminated without any recourse for appeal.   8. I agree not to take my medication from any physicians, nurse practitioners, pharmacies or other sources without telling my prescriber.  9. I agree to take my medication as my prescriber has instructed and not to alter the way I take my medication without first consulting my prescriber.  10. I agree to abstain from problematic alcohol usage, opioids, marijuana, cocaine, and other addictive substances.   11. If I am legally involved related to legal or illegal drugs, including alcohol, refill of controlled substances will not be given until a re-evaluation of my chemical dependency treatment plan has been completed. Refills are at the discretion of the prescriber.   12. I agree to fill all of my controlled medications at an in-state ([place]) pharmacy.   13. I understand that [facility] utilizes the [place] All Schedule Prescription Electronic Reporting  [employer] system and will monitor my prescription history via this source.  14. Benzodiazepines are drugs prescribed to treat conditions like anxiety, insomnia, and seizures. Examples of these drugs include: alprazolam, clonazepam, diazepam, and lorazepam. The FDA has applied a Black Box Warning (one of the strictest warnings) that the use of opioids and benzodiazepines together have serious risks to include unusual dizziness or lightheadedness, extreme sleepiness, slowed or difficult breathing, coma and death. There is an added risk if alcohol is also ingested. It is the policy of [facility] to NOT prescribe benzodiazepines to patients who also use opioids. If a patient already presents already prescribed both, the prescriber my direct the patient to their previous provider who prescribed it or taper the benzodiazepine as part of the treatment plan. These patients must be monitored at appropriate intervals and so visits may be more frequent.     This APRN has discussed and reviewed this information with the patient and/or guardian. The patient and/or guardian verbally agreed (no signatures are obtained during today's visit as they are a telehealth patient and is unable to print and sign this document, therefore, verbal agreement has been obtained).       MEDICATION ISSUES:  Discussed treatment plan and medication options of prescribed medication as well as the risks, benefits, any black box warnings, and side effects including potential falls, possible impaired driving, and metabolic adversities among others, including any off label use of medication. Patient is agreeable to call the office with any worsening of symptoms or onset of side effects, or if any concerns or questions arise.  The contact information for the office is made available to the patient. Patient is agreeable to call 911 or go to the nearest ER should they begin having any SI/HI, or if any urgent concerns arise.        VERBAL INFORMED CONSENT FOR MEDICATION:  The patient was educated that their proposed/prescribed psychotropic medication(s) has potential risks, side effects, adverse effects, and black box warnings; and these have been discussed with the patient.  The patient has been informed that their treatment and medication dosage is to be individualized, and may even be above or below the recommended range/dosage due to patient individualization and response, but medication is prescribed using a shared decision making approach, and no medication or dosage will be prescribed without the patient's verbal consent.  The reason for the use of the medication including any off label use and alternative modes of treatment other than or in addition to medication has been considered and discussed, the probable consequences of not receiving the proposed treatment have been discussed, and any treatment side effects, black box warnings, and cautions associated with treatment have been discussed with the patient.  The patient is allowed ample time to openly discuss and ask questions regarding the proposed medication(s) and treatment plan and the patient verbalizes understanding the reasons for the use of the medication, its potential risks and benefits, other alternative treatment(s), and the probable consequences that may occur if the proposed medication is not given.  The patient has been given ample time to ask questions and study the information and find the information to be specific, accurate, and complete.  The patient gives verbal consent for the medication(s) proposed/prescribed, they verbalized understanding that they can refuse and withdraw consent at any time with the assistance of this APRN, and the patient has verbally confirmed that they are aware, and are willing, to take the prescribed medication and follow the treatment plan with the known possible risks, side effect, black box warnings, and any potential medication  interactions, and the patient reports they will be worse off without this medication and treatment plan.  The patient is advised to contact this APRN/this office if any questions or concerns arise at any time (at [phone]), or call 911/go to the closest emergency department if needed or outside of office hours.      SUICIDE RISK ASSESSMENT AND SAFETY PLAN: Unalterable demographics and a history of mental health intervention indicate this patient is in a high risk category compared to the general population. At present, the patient denies active SI/HI, intentions, or plans at this time and agrees to seek immediate care should such thoughts develop. The patient verbalizes understanding of how to access emergency care if needed and agrees to do so. Consideration of suicide risk and protective factors such as history, current presentation, individual strengths and weaknesses, psychosocial and environmental stressors and variables, psychiatric illness and symptoms, medical conditions and pain, took place in this interview. Based on those considerations, the patient is determined: within individual baseline and presenting no imminent risk for suicide or homicide. Other recommendations: The patient does not meet the criteria for inpatient admission and is not a safety risk to self or others at today's visit. Inpatient treatment offers no significant advantages over outpatient treatment for this patient at today's visit.  The patient was given ample time for questions and fully participated in treatment planning.  The patient was encouraged to call the clinic with any questions or concerns.  The patient was informed of access to emergency care. If patient were to develop any significant symptomatology, suicidal ideation, homicidal ideation, any concerns, or feel unsafe at any time they are to call the clinic and if unable to get immediate assistance should immediately call 911 or go to the nearest emergency room.   Patient contracted verbally for the following: If you are experiencing an emotional crisis or have thoughts of harming yourself or others, please go to your nearest local emergency room or call 911. Will continue to re-assess medication response and side effects frequently to establish efficacy and ensure safety. Risks, any black box warnings, side effects, off label usage, and benefits of medication and treatment discussed with patient, along with potential adverse side effects of current and/or newly prescribed medication, alternative treatment options, and OTC medications.  Patient verbalized understanding of potential risks, any off label use of medication, any black box warnings, and any side effects in their own words. The patient verbalized understanding and agreed to comply with the safety plan discussed in their own words.  Patient given the number to the office. Number also discussed of the 24- hour suicide hotline.       MEDS ORDERED DURING VISIT:  New Medications Ordered This Visit   Medications    amphetamine-dextroamphetamine XR (Adderall XR) 30 MG 24 hr capsule     Sig: Take 1 capsule by mouth Every Morning     Dispense:  30 capsule     Refill:  0     This is a 30 day supply    amphetamine-dextroamphetamine (Adderall) 10 MG tablet     Sig: Take 1/2-1 tablet PO every afternoon PRN     Dispense:  30 tablet     Refill:  0     Pt is also taking Adderall XR. This is a 30 day prescription       Return in about 12 weeks (around 7/30/2025), or if symptoms worsen or fail to improve, for Recheck.     Progress toward goal: Not at goal    Functional Status: Mild impairment     Prognosis: Good with Ongoing Treatment         This document has been electronically signed by MICHEL Guerrero  May 7, 2025 12:00 EDT     Some of the data in this electronic note has been brought forward from a previous encounter, any necessary changes have been made, it has been reviewed by this APRN, and it is accurate.    Visit Time  (face to face direct patient care):  In/Start Time: 8:32.  Out/Stop: 9:33 AM.  59 minutes of face to face direct patient care with the patient spent in coordination of care and counseling the patient regarding diagnoses and treatment planning. Answered any questions patient had with medication and treatment plan.       Total Time spent by this APRN for today's encounter:  73 total minutes were spent by this APRN on charting/documentation, review of past medical records, direct face to face patient care, coordination of care    Please note that portions of this note were completed with a voice recognition program.

## 2025-05-08 ENCOUNTER — OFFICE VISIT (OUTPATIENT)
Dept: FAMILY MEDICINE CLINIC | Facility: CLINIC | Age: 40
End: 2025-05-08
Payer: COMMERCIAL

## 2025-05-08 ENCOUNTER — LAB (OUTPATIENT)
Dept: LAB | Facility: HOSPITAL | Age: 40
End: 2025-05-08
Payer: COMMERCIAL

## 2025-05-08 VITALS
HEART RATE: 89 BPM | SYSTOLIC BLOOD PRESSURE: 126 MMHG | TEMPERATURE: 98.2 F | WEIGHT: 172.2 LBS | OXYGEN SATURATION: 98 % | DIASTOLIC BLOOD PRESSURE: 78 MMHG | BODY MASS INDEX: 28.69 KG/M2 | HEIGHT: 65 IN

## 2025-05-08 DIAGNOSIS — F90.2 ATTENTION DEFICIT HYPERACTIVITY DISORDER, COMBINED TYPE: ICD-10-CM

## 2025-05-08 DIAGNOSIS — Z12.31 ENCOUNTER FOR SCREENING MAMMOGRAM FOR MALIGNANT NEOPLASM OF BREAST: ICD-10-CM

## 2025-05-08 DIAGNOSIS — Z00.00 ANNUAL PHYSICAL EXAM: Primary | ICD-10-CM

## 2025-05-08 DIAGNOSIS — Z00.00 ANNUAL PHYSICAL EXAM: ICD-10-CM

## 2025-05-08 LAB
25(OH)D3 SERPL-MCNC: 27.8 NG/ML (ref 30–100)
ALBUMIN SERPL-MCNC: 4.5 G/DL (ref 3.5–5.2)
ALBUMIN/GLOB SERPL: 1.7 G/DL
ALP SERPL-CCNC: 88 U/L (ref 39–117)
ALT SERPL W P-5'-P-CCNC: 26 U/L (ref 1–33)
AMPHET+METHAMPHET UR QL: POSITIVE
AMPHETAMINES UR QL: NEGATIVE
ANION GAP SERPL CALCULATED.3IONS-SCNC: 8.2 MMOL/L (ref 5–15)
AST SERPL-CCNC: 29 U/L (ref 1–32)
BARBITURATES UR QL SCN: NEGATIVE
BENZODIAZ UR QL SCN: NEGATIVE
BILIRUB SERPL-MCNC: 0.3 MG/DL (ref 0–1.2)
BUN SERPL-MCNC: 18 MG/DL (ref 6–20)
BUN/CREAT SERPL: 21.2 (ref 7–25)
BUPRENORPHINE SERPL-MCNC: NEGATIVE NG/ML
CALCIUM SPEC-SCNC: 9.7 MG/DL (ref 8.6–10.5)
CANNABINOIDS SERPL QL: NEGATIVE
CHLORIDE SERPL-SCNC: 106 MMOL/L (ref 98–107)
CHOLEST SERPL-MCNC: 202 MG/DL (ref 0–200)
CO2 SERPL-SCNC: 23.8 MMOL/L (ref 22–29)
COCAINE UR QL: NEGATIVE
CREAT SERPL-MCNC: 0.85 MG/DL (ref 0.57–1)
DEPRECATED RDW RBC AUTO: 44.7 FL (ref 37–54)
EGFRCR SERPLBLD CKD-EPI 2021: 88.4 ML/MIN/1.73
ERYTHROCYTE [DISTWIDTH] IN BLOOD BY AUTOMATED COUNT: 12.9 % (ref 12.3–15.4)
FENTANYL UR-MCNC: NEGATIVE NG/ML
GLOBULIN UR ELPH-MCNC: 2.7 GM/DL
GLUCOSE SERPL-MCNC: 78 MG/DL (ref 65–99)
HBA1C MFR BLD: 5.6 % (ref 4.8–5.6)
HCT VFR BLD AUTO: 44.4 % (ref 34–46.6)
HDLC SERPL-MCNC: 72 MG/DL (ref 40–60)
HGB BLD-MCNC: 14.6 G/DL (ref 12–15.9)
LDLC SERPL CALC-MCNC: 116 MG/DL (ref 0–100)
LDLC/HDLC SERPL: 1.58 {RATIO}
MCH RBC QN AUTO: 30.9 PG (ref 26.6–33)
MCHC RBC AUTO-ENTMCNC: 32.9 G/DL (ref 31.5–35.7)
MCV RBC AUTO: 94.1 FL (ref 79–97)
METHADONE UR QL SCN: NEGATIVE
OPIATES UR QL: NEGATIVE
OXYCODONE UR QL SCN: NEGATIVE
PCP UR QL SCN: NEGATIVE
PLATELET # BLD AUTO: 301 10*3/MM3 (ref 140–450)
PMV BLD AUTO: 10.2 FL (ref 6–12)
POTASSIUM SERPL-SCNC: 4.8 MMOL/L (ref 3.5–5.2)
PROT SERPL-MCNC: 7.2 G/DL (ref 6–8.5)
RBC # BLD AUTO: 4.72 10*6/MM3 (ref 3.77–5.28)
SODIUM SERPL-SCNC: 138 MMOL/L (ref 136–145)
TRICYCLICS UR QL SCN: NEGATIVE
TRIGL SERPL-MCNC: 80 MG/DL (ref 0–150)
TSH SERPL DL<=0.05 MIU/L-ACNC: 1.35 UIU/ML (ref 0.27–4.2)
VIT B12 BLD-MCNC: 628 PG/ML (ref 211–946)
VLDLC SERPL-MCNC: 14 MG/DL (ref 5–40)
WBC NRBC COR # BLD AUTO: 8.63 10*3/MM3 (ref 3.4–10.8)

## 2025-05-08 PROCEDURE — 84443 ASSAY THYROID STIM HORMONE: CPT

## 2025-05-08 PROCEDURE — 82607 VITAMIN B-12: CPT

## 2025-05-08 PROCEDURE — 80061 LIPID PANEL: CPT

## 2025-05-08 PROCEDURE — 80053 COMPREHEN METABOLIC PANEL: CPT

## 2025-05-08 PROCEDURE — 80307 DRUG TEST PRSMV CHEM ANLYZR: CPT

## 2025-05-08 PROCEDURE — 83036 HEMOGLOBIN GLYCOSYLATED A1C: CPT

## 2025-05-08 PROCEDURE — 82306 VITAMIN D 25 HYDROXY: CPT

## 2025-05-08 PROCEDURE — 85027 COMPLETE CBC AUTOMATED: CPT

## 2025-05-08 NOTE — TELEPHONE ENCOUNTER
James at [facility] pharmacy called, they only have the Naltrexone in a tablet and the lowest dose is 50mg. He said the capsule would be a compounded drug which they can't provide. Requesting that we call him back to advise whether the tablet is okay, or resend rx for capsule to a compounding pharmacy.

## 2025-05-08 NOTE — PROGRESS NOTES
"Chief Complaint  Annual Exam    History of Present Illness      Annual exam  Mammogram up to date   Pap smear: s/p bud, no pelvic complaints today.   Counseled on diet and exercise   Recommend dental and eye exam  hiv hep c sti screening: she declines  Vaccines recommended:  covid vaccine  Pneumonia        Since our last visit she has been started on the cosentyx.     She would like to start on naltrexone instead of meloxicam for her chronic back pain.     Doing well on estrogen.     The following portions of the patient's history were reviewed and updated as appropriate: allergies, current medications, past family history, past medical history, past social history, past surgical history, and problem list.    OBJECTIVE:  /78   Pulse 89   Temp 98.2 °F (36.8 °C) (Infrared)   Ht 165.1 cm (65\")   Wt 78.1 kg (172 lb 3.2 oz)   SpO2 98%   BMI 28.66 kg/m²       Physical Exam  Constitutional:       General: She is not in acute distress.     Appearance: Normal appearance.   HENT:      Head: Normocephalic and atraumatic.   Eyes:      Extraocular Movements: Extraocular movements intact.   Cardiovascular:      Rate and Rhythm: Normal rate and regular rhythm.      Heart sounds: No murmur heard.  Pulmonary:      Effort: Pulmonary effort is normal. No respiratory distress.      Breath sounds: Normal breath sounds. No stridor. No wheezing, rhonchi or rales.   Skin:     Findings: No rash.   Neurological:      General: No focal deficit present.      Mental Status: She is alert.   Psychiatric:         Mood and Affect: Mood normal.                  Assessment and Plan   Diagnoses and all orders for this visit:    1. Annual physical exam (Primary)  -     CBC (No Diff); Future  -     Comprehensive Metabolic Panel; Future  -     Hemoglobin A1c; Future  -     Lipid Panel; Future  -     TSH Rfx On Abnormal To Free T4; Future  -     Vitamin B12; Future  -     Vitamin D,25-Hydroxy; Future    2. Encounter for screening mammogram for " malignant neoplasm of breast  -     Mammo Screening Digital Tomosynthesis Bilateral With CAD; Future    Other orders  -     Naltrexone HCl, Pain, 1.5 MG capsule; Take 1.5 mg by mouth Daily.  Dispense: 30 capsule; Refill: 0        Annual exam  Mammogram up to date   Pap smear: s/p bud, no pelvic complaints today.   Counseled on diet and exercise   Recommend dental and eye exam  hiv hep c sti screening: she declines  Vaccines recommended:  covid vaccine  Pneumonia    We discussed risk of blood clots breast cancer on the estrogen . She feels benefit> risk.    We discussed naltrexone is off label for chronic pain but has promising research. She would like to start this.   Counseled on risk of of mood changes on this or suicidal thoughts. Counseled to never take with alcohol.     Return in about 1 month (around 6/8/2025).       Maxine Cole D.O.  [facility] [place]

## 2025-05-09 NOTE — TELEPHONE ENCOUNTER
Patient would you to send it to the pharmacy shop on [facility]  and she will check price to compound

## 2025-05-23 ENCOUNTER — LAB REQUISITION (OUTPATIENT)
Dept: LAB | Facility: HOSPITAL | Age: 40
End: 2025-05-23
Payer: COMMERCIAL

## 2025-05-23 ENCOUNTER — OUTSIDE FACILITY SERVICE (OUTPATIENT)
Dept: GASTROENTEROLOGY | Facility: CLINIC | Age: 40
End: 2025-05-23
Payer: COMMERCIAL

## 2025-05-23 DIAGNOSIS — R10.84 GENERALIZED ABDOMINAL PAIN: ICD-10-CM

## 2025-05-23 DIAGNOSIS — K21.00 GASTRO-ESOPHAGEAL REFLUX DISEASE WITH ESOPHAGITIS, WITHOUT BLEEDING: ICD-10-CM

## 2025-05-23 PROCEDURE — 88305 TISSUE EXAM BY PATHOLOGIST: CPT | Performed by: INTERNAL MEDICINE

## 2025-05-23 PROCEDURE — 43239 EGD BIOPSY SINGLE/MULTIPLE: CPT | Performed by: INTERNAL MEDICINE

## 2025-05-27 ENCOUNTER — TELEPHONE (OUTPATIENT)
Dept: GASTROENTEROLOGY | Facility: CLINIC | Age: 40
End: 2025-05-27
Payer: COMMERCIAL

## 2025-05-27 LAB — REF LAB TEST METHOD: NORMAL

## 2025-06-09 DIAGNOSIS — F90.2 ATTENTION DEFICIT HYPERACTIVITY DISORDER, COMBINED TYPE: Chronic | ICD-10-CM

## 2025-06-09 DIAGNOSIS — Z79.890 HORMONE REPLACEMENT THERAPY: ICD-10-CM

## 2025-06-09 DIAGNOSIS — F41.8 SITUATIONAL ANXIETY: ICD-10-CM

## 2025-06-09 RX ORDER — DEXTROAMPHETAMINE SACCHARATE, AMPHETAMINE ASPARTATE, DEXTROAMPHETAMINE SULFATE AND AMPHETAMINE SULFATE 2.5; 2.5; 2.5; 2.5 MG/1; MG/1; MG/1; MG/1
TABLET ORAL
Qty: 30 TABLET | Refills: 0 | Status: SHIPPED | OUTPATIENT
Start: 2025-06-09

## 2025-06-09 RX ORDER — MELOXICAM 15 MG/1
15 TABLET ORAL DAILY
Qty: 90 TABLET | Refills: 1 | Status: SHIPPED | OUTPATIENT
Start: 2025-06-09

## 2025-06-09 RX ORDER — BUSPIRONE HYDROCHLORIDE 10 MG/1
10 TABLET ORAL 2 TIMES DAILY PRN
Qty: 60 TABLET | Refills: 2 | Status: SHIPPED | OUTPATIENT
Start: 2025-06-09

## 2025-06-09 RX ORDER — ESTRADIOL 1 MG/1
1 TABLET ORAL DAILY
Qty: 90 TABLET | Refills: 2 | Status: SHIPPED | OUTPATIENT
Start: 2025-06-09

## 2025-06-09 RX ORDER — DEXTROAMPHETAMINE SACCHARATE, AMPHETAMINE ASPARTATE MONOHYDRATE, DEXTROAMPHETAMINE SULFATE AND AMPHETAMINE SULFATE 7.5; 7.5; 7.5; 7.5 MG/1; MG/1; MG/1; MG/1
30 CAPSULE, EXTENDED RELEASE ORAL EVERY MORNING
Qty: 30 CAPSULE | Refills: 0 | Status: SHIPPED | OUTPATIENT
Start: 2025-06-09

## 2025-07-08 DIAGNOSIS — F41.8 SITUATIONAL ANXIETY: ICD-10-CM

## 2025-07-08 DIAGNOSIS — F90.2 ATTENTION DEFICIT HYPERACTIVITY DISORDER, COMBINED TYPE: Chronic | ICD-10-CM

## 2025-07-08 DIAGNOSIS — Z79.890 HORMONE REPLACEMENT THERAPY: ICD-10-CM

## 2025-07-08 RX ORDER — BUSPIRONE HYDROCHLORIDE 10 MG/1
10 TABLET ORAL 2 TIMES DAILY PRN
Qty: 60 TABLET | Refills: 2 | Status: SHIPPED | OUTPATIENT
Start: 2025-07-08

## 2025-07-08 RX ORDER — DEXTROAMPHETAMINE SACCHARATE, AMPHETAMINE ASPARTATE MONOHYDRATE, DEXTROAMPHETAMINE SULFATE AND AMPHETAMINE SULFATE 7.5; 7.5; 7.5; 7.5 MG/1; MG/1; MG/1; MG/1
30 CAPSULE, EXTENDED RELEASE ORAL EVERY MORNING
Qty: 30 CAPSULE | Refills: 0 | Status: SHIPPED | OUTPATIENT
Start: 2025-07-08

## 2025-07-08 RX ORDER — ESTRADIOL 1 MG/1
1 TABLET ORAL DAILY
Qty: 90 TABLET | Refills: 2 | Status: CANCELLED | OUTPATIENT
Start: 2025-07-08

## 2025-07-08 RX ORDER — MELOXICAM 15 MG/1
15 TABLET ORAL DAILY
Qty: 90 TABLET | Refills: 0 | Status: SHIPPED | OUTPATIENT
Start: 2025-07-08

## 2025-07-08 RX ORDER — DEXTROAMPHETAMINE SACCHARATE, AMPHETAMINE ASPARTATE, DEXTROAMPHETAMINE SULFATE AND AMPHETAMINE SULFATE 2.5; 2.5; 2.5; 2.5 MG/1; MG/1; MG/1; MG/1
TABLET ORAL
Qty: 30 TABLET | Refills: 0 | Status: SHIPPED | OUTPATIENT
Start: 2025-07-08

## 2025-07-30 ENCOUNTER — TELEMEDICINE (OUTPATIENT)
Dept: PSYCHIATRY | Facility: CLINIC | Age: 40
End: 2025-07-30
Payer: COMMERCIAL

## 2025-07-30 DIAGNOSIS — F90.2 ATTENTION DEFICIT HYPERACTIVITY DISORDER, COMBINED TYPE: Primary | Chronic | ICD-10-CM

## 2025-07-30 RX ORDER — SECUKINUMAB 300 MG/2ML
INJECTION SUBCUTANEOUS
COMMUNITY
Start: 2025-07-11

## 2025-07-30 RX ORDER — DEXTROAMPHETAMINE SACCHARATE, AMPHETAMINE ASPARTATE MONOHYDRATE, DEXTROAMPHETAMINE SULFATE AND AMPHETAMINE SULFATE 7.5; 7.5; 7.5; 7.5 MG/1; MG/1; MG/1; MG/1
30 CAPSULE, EXTENDED RELEASE ORAL EVERY MORNING
Qty: 30 CAPSULE | Refills: 0 | Status: SHIPPED | OUTPATIENT
Start: 2025-07-30

## 2025-07-30 RX ORDER — DEXTROAMPHETAMINE SACCHARATE, AMPHETAMINE ASPARTATE, DEXTROAMPHETAMINE SULFATE AND AMPHETAMINE SULFATE 2.5; 2.5; 2.5; 2.5 MG/1; MG/1; MG/1; MG/1
TABLET ORAL
Qty: 30 TABLET | Refills: 0 | Status: SHIPPED | OUTPATIENT
Start: 2025-07-30

## 2025-07-30 NOTE — PROGRESS NOTES
This provider is completing this appointment through [facility] (through [facility]), [address], [place], [address] using a secure [employer] Video Visit through [employer]. Patient is being seen remotely via telehealth sitting in their vehicle in [place], and stated they are in a secure environment for this session. The patient's condition being diagnosed/treated is appropriate for telemedicine. If at any point the patient is no longer appropriate for telemedicine, the patient will be referred to in-person services. The provider identified herself as well as her credentials.   The patient, and/or patients guardian, consent to be seen remotely, and when consent is given they understand that the consent allows for patient identifiable information to be sent to a third party as needed.   They may refuse to be seen remotely at any time. The electronic data is encrypted and password protected, and the patient and/or guardian has been advised of the potential risks to privacy not withstanding such measures.    You have chosen to receive care through a telehealth visit.  Do you consent to use a video/audio connection for your medical care today? Yes    Patient identifiers utilized: Name and date of birth.    *Video call was attempted, but the audio service was poor and this APRN had difficulty hearing pt. After several minutes of difficulty communicating, pt gave verbal permission for this APRN to finish the appointment via phone. Total time spent on the phone was 26 minutes*      Subjective   Honey Thomas is a 40 y.o. female who presents today for follow up    Chief Complaint:  ADHD and anxiety    Accompanied by: Pt's  is with her at today's appointment with her verbal permission     History of Present Illness:   Pt was last seen by this APRN on 5/7/25.  Pt presents today for a follow up appointment. Pt reports she has been doing well overall. 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 Anxiety has been more manageable due to being out on summer break from her main job with [employer]. Pt is on \"stay-cation\" this week. The Adderall XR and Adderall IR continue to be effective in the treatment of ADHD. Her executive functioning has improved with the medications making it easier to mange day to day tasks. Pt denies having experienced any chest pain/discomfort, shortness of breath, and/or other cardiac symptoms since their last appointment. At her PCP appointment on 5/8/25, her BP was 126/78 with a HR of 89. Pt recently started a low dose of naltrexone for pain and she has seen relief. Pt reports her middle daughter (7 YO) experienced a tonic-clonic seizure. She has been diagnosed with focal epilepsy. Prior to the seizure, her daughter was having difficulty sleeping and mood lability. Since starting medication, this has improved. Pt is looking to have her daughter tested for celiac disease as she has found research linking her daughter's diagnoses with celiac disease. Sleep has been stable. She is taking a magnesium supplement at night. Appetite is stable The patient reports compliance with current medication regimen. The patient denies any current side effects from their current medication regimen. The patient would like to not adjust or change their medications at this visit. The patient denies any suicidal or homicidal ideations, plans, or intent at today's encounter and is convincing. The patient denies any auditory hallucinations or visual hallucinations. The patient does not endorse any significant symptoms consistent with lulu or psychosis at today's encounter.     *If the patient has any concerns or needs assistance, they may call the [facility] at [phone]*        Prior Psychiatric Medications:  Buspar: pt struggled to remember to take multiple times a day  Zoloft: " ineffective  Wellbutrin XL: ineffective  Paxil: ineffective  Strattera: nausea, irritability  Lexapro - took a dose of 5 mg and felt terrible, discontinued      The following portions of the patient's history were reviewed and updated as appropriate: allergies, current medications, past family history, past medical history, past social history, past surgical history and problem list.          Past Medical History:  Past Medical History:   Diagnosis Date    ADHD (attention deficit hyperactivity disorder) 2021    Allergic     Anxiety     BPPV (benign paroxysmal positional vertigo)     Carpal tunnel syndrome     bilat    Chronic pain disorder     Dx: Ankylosing Spondylitis; tested positive for Celiac DQ-8 gene 3/6/25    Fracture, radius     Left, compound fracture    Fracture, ulna     Left, compound fracture    Neck strain 2009    Tennis elbow 2023    *suspected    TMJ (dislocation of temporomandibular joint) 2021    Vitamin D deficiency        Social History:  Social History     Socioeconomic History    Marital status:    Tobacco Use    Smoking status: Former     Current packs/day: 0.00     Average packs/day: 0.5 packs/day for 10.0 years (5.0 ttl pk-yrs)     Types: Cigarettes     Start date:      Quit date:      Years since quittin.5     Passive exposure: Past    Smokeless tobacco: Never   Vaping Use    Vaping status: Never Used   Substance and Sexual Activity    Alcohol use: Yes     Alcohol/week: 1.0 standard drink of alcohol     Comment: 1 or less beers per week    Drug use: No    Sexual activity: Yes     Partners: Male     Birth control/protection: Bilateral salpingectomy , Hysterectomy, Surgical       Family History:  Family History   Problem Relation Age of Onset    Hypertension Mother     Ovarian cancer Mother 37    Hypertension Father     Diabetes Father     Graves' disease Sister     Scoliosis Sister         Spinal fusion surgery     Sleep apnea Sister     Lung  cancer Maternal Grandmother     Other Paternal Grandfather     Alzheimer's disease Paternal Grandmother     Dementia Paternal Grandmother     Autism Niece     ADD / ADHD Daughter     ADD / ADHD Cousin     Seizures Cousin     Breast cancer Neg Hx        Past Surgical History:  Past Surgical History:   Procedure Laterality Date    ABDOMINAL SURGERY  19    Abdominoplasty    ABDOMINOPLASTY N/A 2019    Procedure: ABDOMINOPLASTY;  Surgeon: Alphonso Vasquez MD;  Location:  [facility];  Service: Plastics    AUGMENTATION MAMMAPLASTY      BREAST AUGMENTATION Bilateral 2019    Procedure: BREAST AUGMENTATION WITH IMPLANTS BILATERAL;  Surgeon: Alphonso Vasquez MD;  Location:  [facility];  Service: Plastics    BREAST SURGERY  19    Augmentation    COSMETIC SURGERY  19    Abdominoplasty; breast augmentation    FRACTURE SURGERY Left 1995    lEFT FOREARM    OOPHORECTOMY      TOTAL LAPAROSCOPIC HYSTERECTOMY SALPINGO OOPHORECTOMY N/A 2019    Procedure: TOTAL ROBOTIC HYSTERECTOMY WITH BILATERAL SALPINGO-OOPHORECTOMY;  Surgeon: Summer Dorsey DO;  Location:  [facility];  Service: DaVinci    WISDOM TOOTH EXTRACTION         Problem List:  Patient Active Problem List   Diagnosis     (normal spontaneous vaginal delivery)    Vitamin D deficiency    Anxiety    BPPV (benign paroxysmal positional vertigo)    Carpal tunnel syndrome    TMJ (dislocation of temporomandibular joint)    Family history of ovarian cancer    Mold exposure    Elevated liver enzymes    Lateral epicondylitis of right elbow    COVID-19 virus detected       Allergy:   No Known Allergies     Current Medications:   Current Outpatient Medications   Medication Sig Dispense Refill    amphetamine-dextroamphetamine (Adderall) 10 MG tablet Take 1/2-1 tablet PO every afternoon PRN 30 tablet 0    amphetamine-dextroamphetamine XR (Adderall XR) 30 MG 24 hr capsule Take 1 capsule by mouth Every Morning 30 capsule 0    Cosentyx UnoReady 300  MG/2ML solution auto-injector       busPIRone (BUSPAR) 10 MG tablet Take 1 tablet by mouth 2 (Two) Times a Day As Needed (Anxiety). 60 tablet 2    estradiol (ESTRACE) 1 MG tablet Take 1 tablet by mouth Daily. 90 tablet 2    meloxicam (MOBIC) 15 MG tablet Take 1 tablet by mouth Daily. 90 tablet 0    Naltrexone HCl, Pain, 1.5 MG capsule Take 1.5 mg by mouth Daily. 90 capsule 0     No current facility-administered medications for this visit.       Review of Symptoms:    Review of Systems   Constitutional: Negative.    Psychiatric/Behavioral: Negative.           Physical Exam:   Due to the remote nature of this encounter (virtual encounter), vitals were unable to be obtained.  Height stated at 66 inches.  Weight stated at 170-172 pounds. This is an estimate         Physical Exam  Neurological:      Mental Status: She is alert.   Psychiatric:         Attention and Perception: Attention and perception normal.         Mood and Affect: Mood and affect normal.         Speech: Speech normal.         Behavior: Behavior normal. Behavior is cooperative.         Thought Content: Thought content normal. Thought content is not paranoid or delusional. Thought content does not include homicidal or suicidal ideation. Thought content does not include homicidal or suicidal plan.         Cognition and Memory: Cognition and memory normal.           Mental Status Exam:   Hygiene:   good  Cooperation:  Cooperative  Eye Contact:  Good  Psychomotor Behavior:  Appropriate  Affect:  Full range  Mood: normal  Speech:  Normal  Thought Process:  Goal directed  Thought Content:  Normal  Suicidal:  None  Homicidal:  None  Hallucinations:  None  Delusion:  None  Memory:  Intact  Orientation:  Person, Place, Time and Situation  Reliability:  good  Insight:  Good  Judgement:  Good  Impulse Control:  Fair      Patient Health Questionnaire-9 (PHQ-9) (Depression Screening Tool)  Little interest or pleasure in doing things? (Patient-Rptd) Not at all    Feeling down, depressed, or hopeless? (Patient-Rptd) Not at all   PHQ-2 Total Score (Patient-Rptd) 0   Trouble falling or staying asleep, or sleeping too much? (Patient-Rptd) Not at all   Feeling tired or having little energy? (Patient-Rptd) Not at all   Poor appetite or overeating? (Patient-Rptd) Not at all   Feeling bad about yourself - or that you are a failure or have let yourself or your family down? (Patient-Rptd) Not at all   Trouble concentrating on things, such as reading the newspaper or watching television? (Patient-Rptd) Several days   Moving or speaking so slowly that other people could have noticed? Or the opposite - being so fidgety or restless that you have been moving around a lot more than usual? (Patient-Rptd) Not at all   Thoughts that you would be better off dead, or of hurting yourself in some way? (Patient-Rptd) Not at all   PHQ-9 Total Score (Patient-Rptd) 1   If you checked off any problems, how difficult have these problems made it for you to do your work, take care of things at home, or get along with other people? (Patient-Rptd) Not difficult at all         PHQ-9 Total Score: (Patient-Rptd) 1       Generalized Anxiety Disorder 7-Item (DA-7) Screening Tool  Feeling nervous, anxious or on edge: (Patient-Rptd) Several days  Not being able to stop or control worrying: (Patient-Rptd) Not at all  Worrying too much about different things: (Patient-Rptd) Not at all  Trouble Relaxing: (Patient-Rptd) Several days  Being so restless that it is hard to sit still: (Patient-Rptd) Several days  Feeling afraid as if something awful might happen: (Patient-Rptd) Not at all  Becoming easily annoyed or irritable: (Patient-Rptd) Several days  DA 7 Total Score: (Patient-Rptd) 4  If you checked any problems, how difficult have these problems made it for you to do your work, take care of things at home, or get along with other people: (Patient-Rptd) Not difficult at all    [facility] request number [id number]  reviewed by this APRN at today's encounter.    Previous Provider notes and available records reviewed by this APRN at today's encounter.         Lab Results:   No visits with results within 1 Month(s) from this visit.   Latest known visit with results is:   Lab Requisition on 2025   Component Date Value Ref Range Status    Reference Lab Report 2025    Final                    Value:[facility]  [address] [place] [address]  Phone: [phone] or [phone]  Fax: [phone]  Avery Valerio M.D., Medical Director    PATIENT NAME                                     LABORATORY NO.  ADILIA CRUZ.                            [id number]  [id number]                                 AGE                    SEX   SSN              CLIENT REF #  [place] [id number]        1985      F     [id number]      [id number]    [address] [facility]                      REQUESTING M.D.           ATTENDING [place] [address]                        ABBY LOU BEVERLY GREGORY  DATE COLLECTED            DATE RECEIVED          DATE REPORTED  2025    DIAGNOSIS:  A.     DUODENUM, BIOPSY:  Duodenal type mucosa with no significant histopathologic abnormalities  Negative for Celiac disease, metaplasia,                           microorganisms or atypia  B.     GASTRIC ANTRUM, BIOPSY:  Gastric mucosa with focal minimal chronic inflammation  Negative for H. pylori, metaplasia or dysplasia  C.     STOMACH, BODY, BIOPSY:  Gastric mucosa with focal minimal chronic inflammation  Negative for H. pylori, metaplasia or dysplasia  D.     ESOPHAGUS, BIOPSY, DISTAL:  Reflux esophagitis (0 eosinophils/hpf)  Negative for intestinal metaplasia, dysplasia or malignancy    RLL    CLINICAL HISTORY:  Generalized abdominal pain, gastro-esophageal reflux disease with  "esophagitis,  without bleeding    SPECIMENS RECEIVED:  A.    DUODENUM, BIOPSY  B.    GASTRIC ANTRUM, BIOPSY  C.    STOMACH, BODY, BIOPSY  D.    ESOPHAGUS, BIOPSY, DISTAL    MICROSCOPIC DESCRIPTION:  Tissue blocks are prepared and slides are examined microscopically on all  specimens. See diagnosis for details.    Professional interpretation rendered by Avery Valerio M.D., DAISY at  [facility], [address] [place] [address].    GROSS                           DESCRIPTION:  A.    Labeled \"duodenum\" are multiple portions of tan soft tissue measuring 1.1  x 0.8 x 0.1 cm in aggregate, submitted entirely in 1 block.  IXC  B.    Labeled \"gastric antrum\" are 2 pieces of tan soft tissue measuring 0.5 x 0.3  x 0.1 cm in aggregate, submitted entirely in 1 block.  C.    Labeled \"stomach body biopsy\" are 2 pieces of tan soft tissue measuring  0.6 x 0.3 x 0.1 cm in aggregate, submitted entirely in 1 block.  D.    Labeled \"distal esophagus\" are 2 pieces of tan-gray fragmented soft tissue  measuring 0.7 x 0.6 x 0.1 cm in aggregate, submitted entirely in 1 block.    REVIEWED, DIAGNOSED AND ELECTRONICALLY  SIGNED BY:    Avery Valerio M.D., F.C.A.P.  CPT CODES:  88305x4           Assessment & Plan   Problems Addressed this Visit    None  Visit Diagnoses         Attention deficit hyperactivity disorder, combined type  (Chronic)   -  Primary    Relevant Medications    amphetamine-dextroamphetamine XR (Adderall XR) 30 MG 24 hr capsule    amphetamine-dextroamphetamine (Adderall) 10 MG tablet          Diagnoses         Codes Comments      Attention deficit hyperactivity disorder, combined type    -  Primary ICD-10-CM: F90.2  ICD-9-CM: 314.01             Visit Diagnoses:    ICD-10-CM ICD-9-CM   1. Attention deficit hyperactivity disorder, combined type  F90.2 314.01         GOALS:  Short Term Goals: Patient will be compliant with medication, and patient will have no significant medication related side effects.  " Patient will be engaged in psychotherapy as indicated.  Patient will report subjective improvement of symptoms.  Long term goals: To stabilize mood and treat/improve subjective symptoms, the patient will stay out of the hospital, the patient will be at an optimal level of functioning, and the patient will take all medications as prescribed.  The patient verbalized understanding and agreement with goals that were mutually set.      TREATMENT PLAN: Continue supportive psychotherapy efforts and medications as indicated.  Medication and treatment options, both pharmacological and non-pharmacological treatment options, discussed during today's visit, including any off label use of medication. Patient acknowledged and verbally consented with current treatment plan and was educated on the importance of compliance with treatment and follow-up appointments.      -Continue Adderall XR 30 mg PO QAM for ADHD   -Continue Adderall 5-10 mg PO every afternoon PRN for ADHD   -Continue hydroxyzine 25-50 mg PO TID PRN for anxiety  -Continue Buspar to 10 mg PO BID PRN for anxiety (use current supply)  -The patient has been made aware that the Biden Administration announced on January 30, 2023 that the COVID-19 Public Health Emergency (COVID-19 PHE) ended on May 11, 2023. The patient is aware that during the COVID-19 PHE a portion of the Robbi Hayley Act was waived, allowing controlled substances to be provided via telemedicine without in-person encounters. The patient is aware that with the COVID-19 PHE having ended the Robbi Hayley Act was to go back into full effect, meaning that prescriptions for controlled substances cannot be given via telemedicine without in-person encounters and meeting all requirements of the Robbi Hayley Act. Discussed with the patient that on May 10, 2023, the Drug Enforcement Agency (DAMIEN), jointly with the Substance Abuse and Mental Health Services Administration (SAMHSA), announced that they are issuing a  temporary rule to extend certain exceptions granted to existing DAMIEN regulations in March 2020 as a result of the COVID-19 PHE, in order to avoid lapses in care for patients. The full set of telemedicine flexibilities regarding prescription of controlled medications as were in place during the COVID-19 PHE will remain in place through December 31, 2025. The patient understands that they will need to establish care with a local provider in their area for an in-person evaluation and treatment with any controlled substance prior to this date as the [facility] is strictly a telemedicine clinic and cannot offer the patient an in-person evaluation to be compliant with the Robbi Hayley Act when it goes back into full effect.     Without the prescribed medication for ADHD, it is reported that the patient has problems with attention and focus including being easily distracted, easily losing objects, trouble with time management, trouble completing tasks because of distractions, procrastination, indecisiveness, careless mistakes in daily activities/work and not finishing jobs that are started. Due to the reported problems without the medication usage, as well as the significant improvement in symptoms with the medication usage, the patient requests to remain on the prescribed medication for ADHD. Patients has been educated on the risks involved with stimulants, such as cardiac complications, weight loss, the potential for dependence and tolerance, mood and behavior changes, and decrease in seizure threshold. Patient is also informed that the medication is to be used by the patient only, the medication is to be used only as directed, and the medication should not be combined with other substances unless directed by a Provider/Prescriber. The patient verbalized understanding and agreement with this in their own words, and wish to continue with current treatment plan.      Controlled  "Substance Medication Contract    Controlled substance medications (i.e. benzodiazepines, opioids, amphetamines) are very useful, but have a high potential for misuse and are, therefore, closely controlled by local, state, and federal government(s). As a patient of [facility], you agree and understand the followin. I am responsible for the controlled substance medications prescribed to me. If my prescription is misplaced, stolen, or if \"I run out early,\" I understand this medication will not be replaced regardless of the circumstances.  2. Refills of controlled substance medications: (a) Will be made only during regular office hours Monday-Thursday once a month and during a scheduled virtual appointment. Refills will not be made at night, weekends, or on holidays. (b) Will not be made if \"I lost my prescriptions,\" \"ran out early,\" or \"misplaced my medication\". I am solely responsible for taking the medication as prescribed and for keeping track of the remaining.   3. I agree to comply with urine drug testing and pill counts at the provider's discretion, thereby, documenting the proper use of any medications. If alcohol abuse is suspected, a breathalyzer or blood alcohol level may be ordered. Unannounced urine or serum toxicology specimens may be requested and my cooperation is required.  4. I understand that if I violate any of the above conditions, my prescriptions for controlled medications my be terminated. If the violation involves obtaining these medications from another individual, or the concomitant use of non-prescription illicit (illegal) drugs, I may also be reported to other providers, pharmacies, medical facilities and the appropriate authorities.   5. I further understand that if I violate this controlled substance contract due to non-compliance of medical directions, such as the failure in taking medications as prescribed, utilizing other illicit drugs, or abuse of " controlled medications, the prescription for controlled medications may be terminated.   6. I agree to keep my scheduled appointments and conduct myself in a courteous manner.  7. I agree not to sell, share, or give any medication to another person. I understand that such mishandling of my medication is a serious violation of this agreement and would result in my treatment being terminated without any recourse for appeal.   8. I agree not to take my medication from any physicians, nurse practitioners, pharmacies or other sources without telling my prescriber.  9. I agree to take my medication as my prescriber has instructed and not to alter the way I take my medication without first consulting my prescriber.  10. I agree to abstain from problematic alcohol usage, opioids, marijuana, cocaine, and other addictive substances.   11. If I am legally involved related to legal or illegal drugs, including alcohol, refill of controlled substances will not be given until a re-evaluation of my chemical dependency treatment plan has been completed. Refills are at the discretion of the prescriber.   12. I agree to fill all of my controlled medications at an in-state ([place]) pharmacy.   13. I understand that [facility] utilizes the [place] All Schedule Prescription Electronic Reporting (GO) system and will monitor my prescription history via this source.  14. Benzodiazepines are drugs prescribed to treat conditions like anxiety, insomnia, and seizures. Examples of these drugs include: alprazolam, clonazepam, diazepam, and lorazepam. The FDA has applied a Black Box Warning (one of the strictest warnings) that the use of opioids and benzodiazepines together have serious risks to include unusual dizziness or lightheadedness, extreme sleepiness, slowed or difficult breathing, coma and death. There is an added risk if alcohol is also ingested. It is the policy of [facility] to NOT prescribe benzodiazepines to patients who also use opioids. If a patient already presents already prescribed both, the prescriber my direct the patient to their previous provider who prescribed it or taper the benzodiazepine as part of the treatment plan. These patients must be monitored at appropriate intervals and so visits may be more frequent.     This APRN has discussed and reviewed this information with the patient and/or guardian. The patient and/or guardian verbally agreed (no signatures are obtained during today's visit as they are a telehealth patient and is unable to print and sign this document, therefore, verbal agreement has been obtained).       MEDICATION ISSUES:  Discussed treatment plan and medication options of prescribed medication as well as the risks, benefits, any black box warnings, and side effects including potential falls, possible impaired driving, and metabolic adversities among others, including any off label use of medication. Patient is agreeable to call the office with any worsening of symptoms or onset of side effects, or if any concerns or questions arise.  The contact information for the office is made available to the patient. Patient is agreeable to call 911 or go to the nearest ER should they begin having any SI/HI, or if any urgent concerns arise.       VERBAL INFORMED CONSENT FOR MEDICATION:  The patient was educated that their proposed/prescribed psychotropic medication(s) has potential risks, side effects, adverse effects, and black box warnings; and these have been discussed with the patient.  The patient has been informed that their treatment and medication dosage is to be individualized, and may even be above or below the recommended range/dosage due to patient individualization and response, but medication is prescribed using a shared decision making approach, and no medication or dosage will be prescribed without the patient's verbal consent.  The reason  for the use of the medication including any off label use and alternative modes of treatment other than or in addition to medication has been considered and discussed, the probable consequences of not receiving the proposed treatment have been discussed, and any treatment side effects, black box warnings, and cautions associated with treatment have been discussed with the patient.  The patient is allowed ample time to openly discuss and ask questions regarding the proposed medication(s) and treatment plan and the patient verbalizes understanding the reasons for the use of the medication, its potential risks and benefits, other alternative treatment(s), and the probable consequences that may occur if the proposed medication is not given.  The patient has been given ample time to ask questions and study the information and find the information to be specific, accurate, and complete.  The patient gives verbal consent for the medication(s) proposed/prescribed, they verbalized understanding that they can refuse and withdraw consent at any time with the assistance of this APRN, and the patient has verbally confirmed that they are aware, and are willing, to take the prescribed medication and follow the treatment plan with the known possible risks, side effect, black box warnings, and any potential medication interactions, and the patient reports they will be worse off without this medication and treatment plan.  The patient is advised to contact this APRN/this office if any questions or concerns arise at any time (at [phone]), or call 911/go to the closest emergency department if needed or outside of office hours.      SUICIDE RISK ASSESSMENT AND SAFETY PLAN: Unalterable demographics and a history of mental health intervention indicate this patient is in a high risk category compared to the general population. At present, the patient denies active SI/HI, intentions, or plans at this time and agrees to seek immediate  care should such thoughts develop. The patient verbalizes understanding of how to access emergency care if needed and agrees to do so. Consideration of suicide risk and protective factors such as history, current presentation, individual strengths and weaknesses, psychosocial and environmental stressors and variables, psychiatric illness and symptoms, medical conditions and pain, took place in this interview. Based on those considerations, the patient is determined: within individual baseline and presenting no imminent risk for suicide or homicide. Other recommendations: The patient does not meet the criteria for inpatient admission and is not a safety risk to self or others at today's visit. Inpatient treatment offers no significant advantages over outpatient treatment for this patient at today's visit.  The patient was given ample time for questions and fully participated in treatment planning.  The patient was encouraged to call the clinic with any questions or concerns.  The patient was informed of access to emergency care. If patient were to develop any significant symptomatology, suicidal ideation, homicidal ideation, any concerns, or feel unsafe at any time they are to call the clinic and if unable to get immediate assistance should immediately call 911 or go to the nearest emergency room.  Patient contracted verbally for the following: If you are experiencing an emotional crisis or have thoughts of harming yourself or others, please go to your nearest local emergency room or call 911. Will continue to re-assess medication response and side effects frequently to establish efficacy and ensure safety. Risks, any black box warnings, side effects, off label usage, and benefits of medication and treatment discussed with patient, along with potential adverse side effects of current and/or newly prescribed medication, alternative treatment options, and OTC medications.  Patient verbalized understanding of potential  risks, any off label use of medication, any black box warnings, and any side effects in their own words. The patient verbalized understanding and agreed to comply with the safety plan discussed in their own words.  Patient given the number to the office. Number also discussed of the 24- hour suicide hotline.       MEDS ORDERED DURING VISIT:  New Medications Ordered This Visit   Medications    amphetamine-dextroamphetamine XR (Adderall XR) 30 MG 24 hr capsule     Sig: Take 1 capsule by mouth Every Morning     Dispense:  30 capsule     Refill:  0     This is a 30 day supply    amphetamine-dextroamphetamine (Adderall) 10 MG tablet     Sig: Take 1/2-1 tablet PO every afternoon PRN     Dispense:  30 tablet     Refill:  0     Pt is also taking Adderall XR. This is a 30 day prescription       Return in about 12 weeks (around 10/22/2025), or if symptoms worsen or fail to improve, for Recheck.     Progress toward goal: Not at goal    Functional Status: Mild impairment     Prognosis: Good with Ongoing Treatment         This document has been electronically signed by MICHEL Guerrero  July 30, 2025 14:43 EDT     Some of the data in this electronic note has been brought forward from a previous encounter, any necessary changes have been made, it has been reviewed by this APRN, and it is accurate.    Please note that portions of this note were completed with a voice recognition program.

## 2025-08-11 ENCOUNTER — HOSPITAL ENCOUNTER (OUTPATIENT)
Dept: MAMMOGRAPHY | Facility: HOSPITAL | Age: 40
Discharge: HOME | End: 2025-08-11
Admitting: STUDENT IN AN ORGANIZED HEALTH CARE EDUCATION/TRAINING PROGRAM
Payer: COMMERCIAL

## 2025-08-11 DIAGNOSIS — Z12.31 ENCOUNTER FOR SCREENING MAMMOGRAM FOR MALIGNANT NEOPLASM OF BREAST: ICD-10-CM

## 2025-08-11 PROCEDURE — 77067 SCR MAMMO BI INCL CAD: CPT

## 2025-08-11 PROCEDURE — 77063 BREAST TOMOSYNTHESIS BI: CPT

## (undated) DEVICE — SYS SKIN CLS DERMABOND PRINEO W/22CM MESH TP

## (undated) DEVICE — COVER,LIGHT HANDLE,FLX,1/PK: Brand: MEDLINE INDUSTRIES, INC.

## (undated) DEVICE — LAPAROSCOPY WOUND CLOSURE SYSTEM KIT CONSISTS OF:05391529640002; NEOCLOSE ANCHORGUIDE 5/12 & 8/15 US; MODEL NUMBER NCA515-U; QTY 10 AND05391529640019; NEOCLOSE AUTOANCHOR X2 PACK US; MODEL NUMBER NCA2-U;  QTY 10: Brand: NEOCLOSE ANCHORGUIDE REGULAR PORT CLOSURE KIT US

## (undated) DEVICE — BRA COMPR SURG STL958 XL

## (undated) DEVICE — BNDG ELAS W/CLIP 6IN 10YD LF STRL

## (undated) DEVICE — [HIGH FLOW INSUFFLATOR,  DO NOT USE IF PACKAGE IS DAMAGED,  KEEP DRY,  KEEP AWAY FROM SUNLIGHT,  PROTECT FROM HEAT AND RADIOACTIVE SOURCES.]: Brand: PNEUMOSURE

## (undated) DEVICE — PK MAJ GYN DAVINCI 10

## (undated) DEVICE — DRAPE,TOP,102X53,STERILE: Brand: MEDLINE

## (undated) DEVICE — VLV 4 NDL/FREE SAFESITE W CAP ASP/INJ

## (undated) DEVICE — DRSNG PAD ABD 8X10IN STRL

## (undated) DEVICE — SUT MNCRYL PLS ANTIB UD 3/0 PS2 27IN

## (undated) DEVICE — BNDR ABD PREMIUM/UNIV 10IN 27TO48IN

## (undated) DEVICE — SYR LL TP 10ML STRL

## (undated) DEVICE — SUT PDS 2/0 CT2 27IN VIL PDP333H

## (undated) DEVICE — TIP COVER ACCESSORY

## (undated) DEVICE — VCARE SMALL UTERINE MANIPULATOR: Brand: VCARE SMALL

## (undated) DEVICE — SYR CONTRL LUERLOK 10CC

## (undated) DEVICE — NDL HYPO ECLPS SFTY 25G 1 1/2IN

## (undated) DEVICE — GLV SURG SENSICARE MICRO PF LF 6.5 STRL

## (undated) DEVICE — SPNG LAP PREWSH SFTPK 18X18IN STRL PK/5

## (undated) DEVICE — APPL CHLORAPREP W/TINT 26ML BLU

## (undated) DEVICE — CLTH CLENS READYCLEANSE PERI CARE PK/5

## (undated) DEVICE — ELECTRD BLD EXT EDGE/INSUL 1P 4IN

## (undated) DEVICE — Device

## (undated) DEVICE — ELECTRD BLD EDGE/INSUL1P SFTY SLV 2.75IN

## (undated) DEVICE — GLV SURG BIOGEL LTX PF 7 1/2

## (undated) DEVICE — BANDAGE,GAUZE,BULKEE II,4.5"X4.1YD,STRL: Brand: MEDLINE

## (undated) DEVICE — OCCL COLPO PNEUMO  STRL

## (undated) DEVICE — PK CHST BRST 10

## (undated) DEVICE — ENDOPATH XCEL BLADELESS TROCARS WITH STABILITY SLEEVES: Brand: ENDOPATH XCEL

## (undated) DEVICE — FLTR PLUMEPORT LAP W/CONN STRL

## (undated) DEVICE — SUT MNCRYL 5/0 PC1 18IN Y834G

## (undated) DEVICE — JACKSON-PRATT 100CC BULB RESERVOIR: Brand: CARDINAL HEALTH

## (undated) DEVICE — DRSNG GZ PETROLTM XEROFORM CURAD 1X8IN STRL

## (undated) DEVICE — INTENDED FOR TISSUE SEPARATION, AND OTHER PROCEDURES THAT REQUIRE A SHARP SURGICAL BLADE TO PUNCTURE OR CUT.: Brand: BARD-PARKER ® SAFETYLOCK CARBON RIB-BACK BLADES

## (undated) DEVICE — ANTIBACTERIAL UNDYED BRAIDED (POLYGLACTIN 910), SYNTHETIC ABSORBABLE SUTURE: Brand: COATED VICRYL

## (undated) DEVICE — OBT BLADLES ENDOWRIST DAVINCI/S 8MM

## (undated) DEVICE — LEX GENERAL ABDOMINAL SPLIT: Brand: MEDLINE INDUSTRIES, INC.

## (undated) DEVICE — SKIN AFFIX SURG ADHESIVE 72/CS 0.55ML: Brand: MEDLINE

## (undated) DEVICE — ELECTRD BLD EDGE/INSUL1P 2.4X5.1MM STRL

## (undated) DEVICE — KT POSTN TRENDELENBURG DLX WING PAD TABL STRAP HDRST XL 1P/U

## (undated) DEVICE — INTENDED USE FOR SURGICAL MARKING ON INTACT SKIN, ALSO PROVIDES A PERMANENT METHOD OF IDENTIFYING OBJECTS IN THE OPERATING ROOM: Brand: WRITESITE® REGULAR TIP SKIN MARKER

## (undated) DEVICE — BLAKE SILICONE DRAIN, 15 FR ROUND, HUBLESS WITH 3/16" TROCAR: Brand: BLAKE

## (undated) DEVICE — CANNULA SEAL